# Patient Record
Sex: FEMALE | Race: WHITE | NOT HISPANIC OR LATINO | Employment: UNEMPLOYED | URBAN - METROPOLITAN AREA
[De-identification: names, ages, dates, MRNs, and addresses within clinical notes are randomized per-mention and may not be internally consistent; named-entity substitution may affect disease eponyms.]

---

## 2019-03-27 DIAGNOSIS — M25.571 RIGHT ANKLE PAIN: Primary | ICD-10-CM

## 2019-03-29 ENCOUNTER — HOSPITAL ENCOUNTER (OUTPATIENT)
Dept: RADIOLOGY | Facility: HOSPITAL | Age: 34
Discharge: HOME | End: 2019-03-29
Attending: ORTHOPAEDIC SURGERY
Payer: OTHER GOVERNMENT

## 2019-03-29 DIAGNOSIS — M25.571 RIGHT ANKLE PAIN: ICD-10-CM

## 2019-03-29 PROCEDURE — 73721 MRI JNT OF LWR EXTRE W/O DYE: CPT | Mod: 26,RT,, | Performed by: RADIOLOGY

## 2019-03-29 PROCEDURE — 73721 MRI ANKLE WITHOUT CONTRAST RIGHT: ICD-10-PCS | Mod: 26,RT,, | Performed by: RADIOLOGY

## 2019-03-29 PROCEDURE — 73721 MRI JNT OF LWR EXTRE W/O DYE: CPT | Mod: TC,RT

## 2023-10-25 ENCOUNTER — OFFICE VISIT (OUTPATIENT)
Dept: RHEUMATOLOGY | Facility: CLINIC | Age: 38
End: 2023-10-25
Payer: OTHER GOVERNMENT

## 2023-10-25 VITALS
HEART RATE: 84 BPM | SYSTOLIC BLOOD PRESSURE: 123 MMHG | HEIGHT: 63 IN | DIASTOLIC BLOOD PRESSURE: 72 MMHG | BODY MASS INDEX: 31.99 KG/M2 | WEIGHT: 180.56 LBS

## 2023-10-25 DIAGNOSIS — R20.0 BILATERAL HAND NUMBNESS: ICD-10-CM

## 2023-10-25 DIAGNOSIS — R53.83 FATIGUE, UNSPECIFIED TYPE: Primary | ICD-10-CM

## 2023-10-25 DIAGNOSIS — R76.8 POSITIVE ANA (ANTINUCLEAR ANTIBODY): ICD-10-CM

## 2023-10-25 PROBLEM — G90.A POTS (POSTURAL ORTHOSTATIC TACHYCARDIA SYNDROME): Status: ACTIVE | Noted: 2023-10-25

## 2023-10-25 PROCEDURE — 99204 PR OFFICE/OUTPT VISIT, NEW, LEVL IV, 45-59 MIN: ICD-10-PCS | Mod: S$PBB,,, | Performed by: STUDENT IN AN ORGANIZED HEALTH CARE EDUCATION/TRAINING PROGRAM

## 2023-10-25 PROCEDURE — 99999 PR PBB SHADOW E&M-NEW PATIENT-LVL III: CPT | Mod: PBBFAC,,, | Performed by: STUDENT IN AN ORGANIZED HEALTH CARE EDUCATION/TRAINING PROGRAM

## 2023-10-25 PROCEDURE — 99204 OFFICE O/P NEW MOD 45 MIN: CPT | Mod: S$PBB,,, | Performed by: STUDENT IN AN ORGANIZED HEALTH CARE EDUCATION/TRAINING PROGRAM

## 2023-10-25 PROCEDURE — 99203 OFFICE O/P NEW LOW 30 MIN: CPT | Mod: PBBFAC,PN | Performed by: STUDENT IN AN ORGANIZED HEALTH CARE EDUCATION/TRAINING PROGRAM

## 2023-10-25 PROCEDURE — 99999 PR PBB SHADOW E&M-NEW PATIENT-LVL III: ICD-10-PCS | Mod: PBBFAC,,, | Performed by: STUDENT IN AN ORGANIZED HEALTH CARE EDUCATION/TRAINING PROGRAM

## 2023-10-25 RX ORDER — NAPROXEN SODIUM 220 MG/1
TABLET, FILM COATED ORAL
COMMUNITY

## 2023-10-25 RX ORDER — FLUTICASONE PROPIONATE 50 MCG
SPRAY, SUSPENSION (ML) NASAL
COMMUNITY
Start: 2023-09-22

## 2023-10-25 RX ORDER — CYANOCOBALAMIN 1000 UG/ML
1000 INJECTION, SOLUTION INTRAMUSCULAR; SUBCUTANEOUS
COMMUNITY
Start: 2023-10-09

## 2023-10-25 RX ORDER — MAGNESIUM OXIDE 200 MG
TABLET,CHEWABLE ORAL
COMMUNITY

## 2023-10-25 RX ORDER — DILTIAZEM HYDROCHLORIDE 120 MG/1
120 CAPSULE, COATED, EXTENDED RELEASE ORAL
COMMUNITY
Start: 2023-09-11

## 2023-10-25 NOTE — PROGRESS NOTES
RHEUMATOLOGY OUTPATIENT CLINIC NOTE    10/25/2023    Attending Rheumatologist: Jagruti Winter  Primary Care Provider: No, Primary Doctor   Physician Requesting Consultation: Self, Aaareferral  No address on file  Chief Complaint/Reason For Consultation:  Positive ALEXIS, Rash, Fatigue, and Joint Pain      Subjective:       HPI  Luisa Jesus is a 38 y.o. White female with history of POTS, iron deficiency, vitamin B12 deficiency who comes for evaluation of positive ALEXIS.     She reports significant fatigue for about 1 year.   She was found to have low ferritin and have received IV iron intermittently. She reports her ferritin continues to drop and requires repeat IV iron. She has had colonoscopy and did not show any colitis or IBD. She has heavy menstrual periods.   She also received weekly B12 injections.   Vitamin D has been in the normal range. She takes daily vitamin D   She has been tested for sleep apnea and it is negative.     Regarding her cardiology issues, she notes elevated HR all the time. She is getting workup for afib, pending Holter monitor. She also has POTS. Bisoprolol caused fatigue. Has tried other BB but caused SE.    Has noted small hives in her wrist. Also noted that her face and neck can turn red. She feels like a burning sensation, no itching. She noticed it in the morning when she wakes up, usually gone by the night. Happens at least 3 times per week. With no trigger. Not related to sun exposure. She had recent rash when electrodes were placed for cardiac monitoring.     She reports dry mouth but not trouble swallowing.     Had painful sore in the lip, intermittently     Reports significant pain in both hands, R>L, feels like numbness and tingling and it burns, uses a brace at night but does not help.     Reports knee stiffness at random times, worse when sitting for a while.     She reports bilateral leg swelling  at the end of the day.     She keeps herself very active. 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 She lifts weights, she does cardio.     Denies raynauds.     Adopted so unsure of family history.     Answers submitted by the patient for this visit:  Rheumatology Questionnaire (Submitted on 10/25/2023)  fever: No  eye redness: No  mouth sores: No  headaches: Yes  shortness of breath: Yes  chest pain: Yes  trouble swallowing: No  diarrhea: Yes  constipation: No  unexpected weight change: No  genital sore: No  dysuria: No  During the last 3 days, have you had a skin rash?: Yes  Bruises or bleeds easily: Yes  cough: No      Chronic comorbid conditions affecting medical decision making today:  Past Medical History:   Diagnosis Date    Allergy     Atrial fibrillation 10/01/2023    aproxx    IBS (irritable bowel syndrome)     SVT (supraventricular tachycardia)     Thrush 01/22/2015    recurrent     Past Surgical History:   Procedure Laterality Date    CHOLECYSTECTOMY      [place] EXPLORATORY OF ABDOMEN      SINUS SURGERY  2007    SPINE SURGERY      C spine    TONSILLECTOMY      VENTRICULAR ABLATION SURGERY      WISDOM TOOTH EXTRACTION       Family History   Problem Relation Age of Onset    Heart disease Maternal Grandmother     Eclampsia Neg Hx     Colon cancer Neg Hx      Social History     Substance and Sexual Activity   Alcohol Use Yes    Comment: rarely     Social History     Tobacco Use   Smoking Status Never   Smokeless Tobacco Never     Social History     Substance and Sexual Activity   Drug Use No       Current Outpatient Medications:     aspirin 81 MG Chew, , Disp: , Rfl:     CHOLECALCIFEROL, VITAMIN D3, (VITAMIN D3 ORAL), Take by mouth., Disp: , Rfl:     cyanocobalamin 1,000 mcg/mL injection, Inject 1,000 mcg into the muscle every 30 days., Disp: , Rfl:     diltiaZEM (CARDIZEM CD) 120 MG Cp24, 120 mg., Disp: , Rfl:     fluticasone propionate (FLONASE) 50 mcg/actuation nasal spray, by Each Nostril route., Disp: , Rfl:     magnesium oxide 200 mg magnesium Chew, Take by  mouth., Disp: , Rfl:     multivit with calcium,iron,min (MULTIVITAMIN-CALCIUM AND IRON ORAL), , Disp: , Rfl:      Objective:         Vitals:    10/25/23 1347   BP: 123/72   Pulse: 84     Physical Exam   Constitutional: She is oriented to person, place, and time. She appears well-developed.   HENT:   Head: Normocephalic.   Mouth/Throat: Mucous membranes are moist.   Salivary pool adequate  Oral aperture is normal   Mouth/Throat: No oral ulcers   Cardiovascular: Normal rate and normal heart sounds.   Pulmonary/Chest: Effort normal and breath sounds normal.   Abdominal: Soft.   Musculoskeletal:      Cervical back: Neck supple.      Comments: Cspine FROM no tenderness  Tspine FROM no tenderness  Lspine FROM no tenderness.  Shoulders: FROM; no synovitis;  Elbows: FROM; no synovitis; no tophi or nodules  Wrists: FROM; no synovitis;    MCPs: FROM; no synovitis;   PIPs:FROM; no synovitis;   DIPs: FROM; no synovitis;   HIPS: FROM  Knees: FROM; no synovitis; no instability  Ankles: FROM: no synovitis   Toes: no tenderness on palpation.       Lymphadenopathy:     She has no cervical adenopathy.   Neurological: She is alert and oriented to person, place, and time.   Skin: No rash noted.   No skin rashes noted  Normal Capillaroscopy   Vitals reviewed.        All lab results personally reviewed and interpreted by me.  Lab Results   Component Value Date    WBC 4.82 08/04/2014    HGB 12.0 08/04/2014    HCT 39.8 08/04/2014    MCV 91 08/04/2014    MCH 27.3 08/04/2014    MCHC 30.2 (L) 08/04/2014    RDW 14.5 08/04/2014     08/04/2014    MPV 11.2 08/04/2014       Lab Results   Component Value Date     08/04/2014    K 4.0 08/04/2014     08/04/2014    CO2 20 (L) 08/04/2014    GLU 88 08/04/2014    BUN 10 08/04/2014    CALCIUM 9.0 08/04/2014    PROT 6.9 08/04/2014    ALBUMIN 4.1 08/04/2014    BILITOT 0.6 08/04/2014    AST 18 08/04/2014    ALKPHOS 62 08/04/2014    ALT 11 08/04/2014       Lab Results   Component Value Date  "   COLORU Yellow 09/22/2014    APPEARANCEUA Clear 09/22/2014    SPECGRAV 1.010 09/22/2014    PHUR 8.0 09/22/2014    PROTEINUA Negative 09/22/2014    KETONESU Negative 09/22/2014    LEUKOCYTESUR Negative 09/22/2014    NITRITE Negative 09/22/2014    UROBILINOGEN Negative 09/22/2014       Lab Results   Component Value Date    CRP 1.1 08/04/2014       Lab Results   Component Value Date    RF <10.0 01/27/2014    SEDRATE 6 01/27/2014    CCPANTIBODIE <0.5 01/27/2014       No components found for: "25OHVITDTOT", "82FBFYVD0", "62GELYAN1", "METHODNOTE"    No results found for: "URICACID"    No results found for: "QUANTIFERON", "HEPBCOREIGG", "HEPBSAB", "HEPBSAG", "HEPCAB"    Imaging:  All imaging reviewed and independently interpreted by me.         ASSESSMENT / PLAN:     Luisa Jesus is a 38 y.o. White female with history of POTS, iron deficiency, vitamin B12 deficiency who comes for evaluation of positive ALEXIS.       1. Fatigue, unspecified type  -Likely multifactorial.   -Iron deficiency is being treated with IV iron intermittenly. Takes vit B12 injections weekly.   -No sleep apnea, has been tested  -Discussed about FMS. She is already doing all non pharmacological measures. No medications as of now.   -Will recheck labs in 6 months     2. Positive ALEXIS (antinuclear antibody)  -ALEXIS 1:80 cytoplasmic reticular. Negative SSA, SSB, DSDNA, ribosomal P, SCL-70, Sm, SM/RNP.   -Neg RF. Normal C3 and C4. Normal ESR and CRP  -Her symptoms are not consistent with CTD at the moment. Discussed with patient to be aware if development of worsening and persistent joint pain with swelling, raynauds, photosensitive rashes, ulcers in the mouth that are painless.     3. Bilateral hand numbness  -I suspect that her hand numbness may be related to CTS.   -Will order EMG/NCS of UE.     Follow up in about 6 months (around 4/25/2024).    Method of contact with patient concerns: [facility]    Disclaimer:  This note is prepared " using voice recognition software and as such is likely to have errors and has not been proof read. Please contact me for questions.     Time spent: 45 minutes in face to face discussion concerning diagnosis, prognosis, review of lab and test results, benefits of treatment as well as management of disease, counseling of patient and coordination of care between various health care providers.  Greater than half the time spent was used for coordination of care and counseling of patient.    Jagruti Winter M.D.  Rheumatology  [facility]

## 2023-11-01 ENCOUNTER — TELEPHONE (OUTPATIENT)
Dept: RHEUMATOLOGY | Facility: CLINIC | Age: 38
End: 2023-11-01
Payer: OTHER GOVERNMENT

## 2023-11-01 NOTE — TELEPHONE ENCOUNTER
Contacted patient in regards to scheduling her EMG. She advised that she would like to go to the [facility] in [place] because that is closer to her. I provided her with the number to call to get this scheduled and let her know to call me if she did not have any success scheduling

## 2023-11-10 ENCOUNTER — PROCEDURE VISIT (OUTPATIENT)
Dept: NEUROLOGY | Facility: CLINIC | Age: 38
End: 2023-11-10
Payer: OTHER GOVERNMENT

## 2023-11-10 DIAGNOSIS — R20.0 BILATERAL HAND NUMBNESS: ICD-10-CM

## 2023-11-10 PROCEDURE — 95913 PR NERVE CONDUCTION STUDY; 13 OR MORE STUDIES: ICD-10-PCS | Mod: 26,S$PBB,, | Performed by: PSYCHIATRY & NEUROLOGY

## 2023-11-10 PROCEDURE — 95886 PR EMG COMPLETE, W/ NERVE CONDUCTION STUDIES, 5+ MUSCLES: ICD-10-PCS | Mod: 26,S$PBB,, | Performed by: PSYCHIATRY & NEUROLOGY

## 2023-11-10 PROCEDURE — 95886 MUSC TEST DONE W/N TEST COMP: CPT | Mod: 26,S$PBB,, | Performed by: PSYCHIATRY & NEUROLOGY

## 2023-11-10 PROCEDURE — 95913 NRV CNDJ TEST 13/> STUDIES: CPT | Mod: 26,S$PBB,, | Performed by: PSYCHIATRY & NEUROLOGY

## 2023-11-10 PROCEDURE — 95913 NRV CNDJ TEST 13/> STUDIES: CPT | Mod: PBBFAC | Performed by: PSYCHIATRY & NEUROLOGY

## 2023-11-10 PROCEDURE — 95886 MUSC TEST DONE W/N TEST COMP: CPT | Mod: PBBFAC | Performed by: PSYCHIATRY & NEUROLOGY

## 2023-11-29 ENCOUNTER — PATIENT MESSAGE (OUTPATIENT)
Dept: RHEUMATOLOGY | Facility: CLINIC | Age: 38
End: 2023-11-29
Payer: OTHER GOVERNMENT

## 2023-12-05 DIAGNOSIS — R20.0 BILATERAL HAND NUMBNESS: ICD-10-CM

## 2023-12-05 DIAGNOSIS — G56.00 CARPAL TUNNEL SYNDROME, UNSPECIFIED LATERALITY: ICD-10-CM

## 2023-12-05 DIAGNOSIS — N60.19 FIBROCYSTIC BREAST CHANGES, UNSPECIFIED LATERALITY: Primary | ICD-10-CM

## 2023-12-08 DIAGNOSIS — M79.642 BILATERAL HAND PAIN: Primary | ICD-10-CM

## 2023-12-08 DIAGNOSIS — M79.641 BILATERAL HAND PAIN: Primary | ICD-10-CM

## 2023-12-14 ENCOUNTER — OFFICE VISIT (OUTPATIENT)
Dept: ORTHOPEDICS | Facility: CLINIC | Age: 38
End: 2023-12-14
Payer: OTHER GOVERNMENT

## 2023-12-14 ENCOUNTER — HOSPITAL ENCOUNTER (OUTPATIENT)
Dept: RADIOLOGY | Facility: HOSPITAL | Age: 38
Discharge: HOME | End: 2023-12-14
Attending: ORTHOPAEDIC SURGERY
Payer: OTHER GOVERNMENT

## 2023-12-14 VITALS — HEIGHT: 63 IN | RESPIRATION RATE: 18 BRPM | BODY MASS INDEX: 31.89 KG/M2 | WEIGHT: 180 LBS

## 2023-12-14 DIAGNOSIS — G56.00 CARPAL TUNNEL SYNDROME, UNSPECIFIED LATERALITY: ICD-10-CM

## 2023-12-14 DIAGNOSIS — R20.0 BILATERAL HAND NUMBNESS: ICD-10-CM

## 2023-12-14 DIAGNOSIS — M79.642 BILATERAL HAND PAIN: ICD-10-CM

## 2023-12-14 DIAGNOSIS — M79.641 BILATERAL HAND PAIN: ICD-10-CM

## 2023-12-14 PROCEDURE — 73130 X-RAY EXAM OF HAND: CPT | Mod: TC,50,PN

## 2023-12-14 PROCEDURE — 99999 PR PBB SHADOW E&M-EST. PATIENT-LVL III: CPT | Mod: PBBFAC,,, | Performed by: ORTHOPAEDIC SURGERY

## 2023-12-14 PROCEDURE — 73130 XR HAND COMPLETE 3 VIEWS BILATERAL: ICD-10-PCS | Mod: 26,50,, | Performed by: RADIOLOGY

## 2023-12-14 PROCEDURE — 99999 PR PBB SHADOW E&M-EST. PATIENT-LVL III: ICD-10-PCS | Mod: PBBFAC,,, | Performed by: ORTHOPAEDIC SURGERY

## 2023-12-14 PROCEDURE — 99204 PR OFFICE/OUTPT VISIT, NEW, LEVL IV, 45-59 MIN: ICD-10-PCS | Mod: S$PBB,25,, | Performed by: ORTHOPAEDIC SURGERY

## 2023-12-14 PROCEDURE — 73130 X-RAY EXAM OF HAND: CPT | Mod: 26,50,, | Performed by: RADIOLOGY

## 2023-12-14 PROCEDURE — 99204 OFFICE O/P NEW MOD 45 MIN: CPT | Mod: S$PBB,25,, | Performed by: ORTHOPAEDIC SURGERY

## 2023-12-14 PROCEDURE — 20526 THER INJECTION CARP TUNNEL: CPT | Mod: S$PBB,RT,, | Performed by: ORTHOPAEDIC SURGERY

## 2023-12-14 PROCEDURE — 99999PBSHW PR PBB SHADOW TECHNICAL ONLY FILED TO HB: Mod: PBBFAC,,,

## 2023-12-14 PROCEDURE — 99213 OFFICE O/P EST LOW 20 MIN: CPT | Mod: PBBFAC,25,PN | Performed by: ORTHOPAEDIC SURGERY

## 2023-12-14 PROCEDURE — 20526 PR INJECT CARPAL TUNNEL: ICD-10-PCS | Mod: S$PBB,RT,, | Performed by: ORTHOPAEDIC SURGERY

## 2023-12-14 PROCEDURE — 99999PBSHW PR PBB SHADOW TECHNICAL ONLY FILED TO HB: ICD-10-PCS | Mod: PBBFAC,,,

## 2023-12-14 PROCEDURE — 20526 THER INJECTION CARP TUNNEL: CPT | Mod: PBBFAC,PN,RT | Performed by: ORTHOPAEDIC SURGERY

## 2023-12-14 RX ORDER — PREDNISONE 5 MG/1
TABLET ORAL
COMMUNITY
Start: 2023-12-11

## 2023-12-14 RX ORDER — TRIAMCINOLONE ACETONIDE 40 MG/ML
40 INJECTION, SUSPENSION INTRA-ARTICULAR; INTRAMUSCULAR
Status: DISCONTINUED | OUTPATIENT
Start: 2023-12-14 | End: 2023-12-14 | Stop reason: HOSPADM

## 2023-12-14 RX ADMIN — TRIAMCINOLONE ACETONIDE 40 MG: 40 INJECTION, SUSPENSION INTRA-ARTICULAR; INTRAMUSCULAR at 03:12

## 2023-12-14 NOTE — PROCEDURES
Carpal Tunnel    Date/Time: 12/14/2023 3:45 PM    Performed by: Fredrick Carvajal MD  Authorized by: Fredrick Carvajal MD    Consent Done?:  Yes (Verbal)  Indications:  Pain  Site marked: the procedure site was marked    Timeout: prior to procedure the correct patient, procedure, and site was verified    Prep: patient was prepped and draped in usual sterile fashion      Local anesthesia used?: Yes    Anesthesia:  Local infiltration  Local anesthetic:  Bupivacaine 0.25% without epinephrine and lidocaine 1% without epinephrine  Anesthetic total (ml):  2    Location: R carpal tunnel.  Needle size:  21 G  Approach:  Volar  Medications:  40 mg triamcinolone acetonide 40 mg/mL  Patient tolerance:  Patient tolerated the procedure well with no immediate complications

## 2023-12-14 NOTE — PROGRESS NOTES
Subjective:      Patient ID: Luisa Jesus is a 38 y.o. female.    Chief Complaint: No chief complaint on file.    HPI  38-year-old female with a many year history of bilateral hand pain tingling and numbness right greater than left.  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  She recalls no particular trauma to her hand.  She has been wearing night splints for approximate [place] taking occasional anti-inflammatory medicines without any improvement.  Recently he has had nerve studies referred to us for further evaluation.  ROS      Objective:    Ortho Exam     Constitutional:   Patient is alert  and oriented in no acute distress  HEENT:  normocephalic atraumatic; PERRL EOMI  Neck:  Supple without adenopathy  Cardiovascular:  Normal rate and rhythm  Pulmonary:  Normal respiratory effort normal chest wall expansion  Abdominal:  Nonprotuberant nondistended  Musculoskeletal:  Patient has adequate cervical range of motion without tenderness  With a negative Lhermitte's and Spurling sign.  Upon gross inspection of the hands  There is no mass, swelling, or atrophy.  Strength is 5/5 including APB strength testing  Negative Finkelstein's and CMC grind.  Positive primarily right carpal tunnel compression test  Negative Tinel's.  Equivocal Phalen's  Neurological:  No focal defect; cranial nerves 2-12 grossly intact  Psychiatric/behavioral:  Mood and behavior normal           My Radiographs Findings:    Radiographs of the right and left hands without acute osseous abnormalities  Assessment:       Encounter Diagnoses   Name Primary?    Carpal tunnel syndrome, unspecified laterality     Bilateral hand numbness          Plan:       I have discussed medical condition treatment options with her at length.  With the longevity of her symptoms and progression of the right hand she maybe a future candidate for carpal tunnel release.  I think it reasonable to give her a trial of an injection along with the  bracing.  Carpal carpal canal injection was done today after verbal consent and sterile prep without complication.  I have discussed activity restrictions over the next 6 weeks.  Follow up at that time for re-evaluation sooner if any questions or problems.        Past Medical History:   Diagnosis Date    Allergy     Atrial fibrillation 10/01/2023    aproxx    IBS (irritable bowel syndrome)     SVT (supraventricular tachycardia)     Thrush 01/22/2015    recurrent     Past Surgical History:   Procedure Laterality Date    CHOLECYSTECTOMY      [place] EXPLORATORY OF ABDOMEN      SINUS SURGERY  2007    SPINE SURGERY      C spine    TONSILLECTOMY      VENTRICULAR ABLATION SURGERY      WISDOM TOOTH EXTRACTION           Current Outpatient Medications:     aspirin 81 MG Chew, , Disp: , Rfl:     CHOLECALCIFEROL, VITAMIN D3, (VITAMIN D3 ORAL), Take by mouth., Disp: , Rfl:     cyanocobalamin 1,000 mcg/mL injection, Inject 1,000 mcg into the muscle every 30 days., Disp: , Rfl:     diltiaZEM (CARDIZEM CD) 120 MG Cp24, 120 mg., Disp: , Rfl:     fluticasone propionate (FLONASE) 50 mcg/actuation nasal spray, by Each Nostril route., Disp: , Rfl:     magnesium oxide 200 mg magnesium Chew, Take by mouth., Disp: , Rfl:     multivit with calcium,iron,min (MULTIVITAMIN-CALCIUM AND IRON ORAL), , Disp: , Rfl:     Review of patient's allergies indicates:   Allergen Reactions    Diclofenac sodium Other (See Comments)     Other reaction(s): Bloody stool    Ortho tri-cyclen (21) Hives    Sulfa (sulfonamide antibiotics) Hives and Rash    Scopolamine     Sulfur Hives    Bactrim [sulfamethoxazole-trimethoprim] Rash       Family History   Problem Relation Age of Onset    Heart disease Maternal Grandmother     Eclampsia Neg Hx     Colon cancer Neg Hx      Social History     Occupational History    Occupation: back in school for nutrition   Tobacco Use    Smoking status: Never    Smokeless tobacco: Never   Substance and Sexual Activity    Alcohol use: Yes      Comment: rarely    Drug use: No    Sexual activity: Yes     Partners: Male     Birth control/protection: None

## 2024-03-08 ENCOUNTER — TELEPHONE (OUTPATIENT)
Dept: OBSTETRICS AND GYNECOLOGY | Facility: CLINIC | Age: 39
End: 2024-03-08
Payer: OTHER GOVERNMENT

## 2024-03-08 ENCOUNTER — HOSPITAL ENCOUNTER (OUTPATIENT)
Dept: RADIOLOGY | Facility: HOSPITAL | Age: 39
Discharge: HOME | End: 2024-03-08
Attending: PHYSICIAN ASSISTANT
Payer: OTHER GOVERNMENT

## 2024-03-08 VITALS
DIASTOLIC BLOOD PRESSURE: 84 MMHG | BODY MASS INDEX: 30.65 KG/M2 | HEART RATE: 98 BPM | SYSTOLIC BLOOD PRESSURE: 120 MMHG | WEIGHT: 173 LBS | HEIGHT: 63 IN

## 2024-03-08 DIAGNOSIS — N60.19 FIBROCYSTIC BREAST CHANGES, UNSPECIFIED LATERALITY: ICD-10-CM

## 2024-03-08 DIAGNOSIS — N94.6 DYSMENORRHEA: ICD-10-CM

## 2024-03-08 DIAGNOSIS — N92.0 MENORRHAGIA WITH REGULAR CYCLE: ICD-10-CM

## 2024-03-08 DIAGNOSIS — N94.6 DYSMENORRHEA: Primary | ICD-10-CM

## 2024-03-08 PROCEDURE — 99999 PR PBB SHADOW E&M-EST. PATIENT-LVL IV: CPT | Mod: PBBFAC,,, | Performed by: PHYSICIAN ASSISTANT

## 2024-03-08 PROCEDURE — 99214 OFFICE O/P EST MOD 30 MIN: CPT | Mod: PBBFAC | Performed by: PHYSICIAN ASSISTANT

## 2024-03-08 PROCEDURE — 76830 TRANSVAGINAL US NON-OB: CPT | Mod: TC

## 2024-03-08 PROCEDURE — 99204 OFFICE O/P NEW MOD 45 MIN: CPT | Mod: S$PBB,,, | Performed by: PHYSICIAN ASSISTANT

## 2024-03-08 NOTE — PROGRESS NOTES
Subjective:      Luisa Jesus is a 39 y.o. female who presents for dysmenorrhea and AUB. Menarche at age 10. She is . She has always had heavy and painful periods. Periods typically lasting about 10 days. She also reports dyspareunia and has been told she likely has endometriosis. Unable to tolerate OCP in the past and declined IUD. In 2024 she had heavy vaginal bleeding x 19 days. Given depo-provera and bleeding stopped. Gyn in [facility] did endometrial biopsy at that time that was negative per pt. She did have another period on 24 that was dark brown, light and malodorous. She is interested in considering surgical options.   3cm cyst in the left breast. Saw surgery who discussed draining vs removal, but has decided to monitor for now.    Mammogram: 10/12/2023 (scanned in media)  Pap: 10/2023 with gyn, negative  PCP: Madelyn Peterson MD  Routine Screening Labs: 2024 (scanned in media)    No visits with results within 3 Month(s) from this visit.   Latest known visit with results is:   Office Visit on 2016   Component Date Value Ref Range Status    HPV DNA 2016 Not detected  Not detected Final       Past Medical History:   Diagnosis Date    Allergy     Atrial fibrillation 10/01/2023    aproxx    IBS (irritable bowel syndrome)     SVT (supraventricular tachycardia)     Thrush 2015    recurrent     Past Surgical History:   Procedure Laterality Date    CHOLECYSTECTOMY      [place] EXPLORATORY OF ABDOMEN      SINUS SURGERY      SPINE SURGERY      C spine    TONSILLECTOMY      VENTRICULAR ABLATION SURGERY      WISDOM TOOTH EXTRACTION       Social History     Tobacco Use    Smoking status: Never    Smokeless tobacco: Never   Substance Use Topics    Alcohol use: Yes     Comment: rarely    Drug use: No     Family History   Adopted: Yes   Problem Relation Age of Onset    Heart disease Maternal Grandmother     Eclampsia Neg Hx     Colon cancer Neg Hx      OB History    Para Term  " AB Living   3 3           SAB IAB Ectopic Multiple Live Births                  # Outcome Date GA Lbr Jose/2nd Weight Sex Delivery Anes PTL Lv   3 Para            2 Para            1 Para                Current Outpatient Medications:     aspirin 81 MG Chew, , Disp: , Rfl:     CHOLECALCIFEROL, VITAMIN D3, (VITAMIN D3 ORAL), Take by mouth., Disp: , Rfl:     cyanocobalamin 1,000 mcg/mL injection, Inject 1,000 mcg into the muscle every 30 days., Disp: , Rfl:     diltiaZEM (CARDIZEM CD) 120 MG Cp24, 120 mg., Disp: , Rfl:     fluticasone propionate (FLONASE) 50 mcg/actuation nasal spray, by Each Nostril route., Disp: , Rfl:     magnesium oxide 200 mg magnesium Chew, Take by mouth., Disp: , Rfl:     multivit with calcium,iron,min (MULTIVITAMIN-CALCIUM AND IRON ORAL), , Disp: , Rfl:     predniSONE (DELTASONE) 5 MG tablet, Take by mouth., Disp: , Rfl:     Review of Systems:  General: No fever, chills, or weight loss.  Chest: No chest pain, shortness of breath, or palpitations.  Breast: No pain, masses, or nipple discharge.  Vulva: No pain, lesions, or itching.  Vagina: No relaxation, itching, discharge, or lesions.  Abdomen: No pain, nausea, vomiting, diarrhea, or constipation.  Urinary: No incontinence, nocturia, frequency, or dysuria.  Extremities:  No leg cramps, edema, or calf pain.  Neurologic: No headaches, dizziness, or visual changes.    Objective:     Vitals:    24 1427   BP: 120/84   Pulse: 98   Weight: 78.5 kg (173 lb)   Height: 5' 3" (1.6 m)   PainSc: 0-No pain     Body mass index is 30.65 kg/m².    PHYSICAL EXAM:  APPEARANCE: Well nourished, well developed, in no acute distress.  AFFECT: WNL, alert and oriented x 3    Assessment:      Dysmenorrhea  -     US Pelvis Comp with Transvag NON-OB (xpd; Future; Expected date: 2024    Fibrocystic breast changes, unspecified laterality  -     Ambulatory referral/consult to Gynecology    Menorrhagia with regular cycle  -     US Pelvis Comp with Transvag " NON-OB (xpd; Future; Expected date: 03/08/2024        Plan:   Pelvic US  She is currently on depo-provera with outside gyn and controlling bleeding.  Referral to Dr. Martinez for concerns for endometriosis  Follow up PRN    Instructed patient to call if she experiences any side effects or has any questions.    I spent a total of 30 minutes on the day of the visit.This includes face to face time and non-face to face time preparing to see the patient (eg, review of tests), obtaining and/or reviewing separately obtained history, documenting clinical information in the electronic or other health record, independently interpreting results and communicating results to the patient/family/caregiver, or care coordinator.

## 2024-03-08 NOTE — TELEPHONE ENCOUNTER
----- Message from Alisson Cook PA-C sent at 3/8/2024  3:31 PM CST -----  Hi!  This is a new patient who has been told she has endometriosis due to menorrhagia, dysmenorrhea and dyspareunia. She is interested in surgical options. Do you mind scheduling her with Dr. Martinez? Thank you!  Alisson

## 2024-04-18 ENCOUNTER — OFFICE VISIT (OUTPATIENT)
Dept: OBSTETRICS AND GYNECOLOGY | Facility: CLINIC | Age: 39
End: 2024-04-18
Payer: OTHER GOVERNMENT

## 2024-04-18 VITALS
BODY MASS INDEX: 30.97 KG/M2 | HEIGHT: 63 IN | SYSTOLIC BLOOD PRESSURE: 129 MMHG | WEIGHT: 174.81 LBS | HEART RATE: 84 BPM | DIASTOLIC BLOOD PRESSURE: 75 MMHG

## 2024-04-18 DIAGNOSIS — N94.6 DYSMENORRHEA: Primary | ICD-10-CM

## 2024-04-18 DIAGNOSIS — N80.30 ENDOMETRIOSIS, PELVIC PERITONEUM: ICD-10-CM

## 2024-04-18 PROCEDURE — 99215 OFFICE O/P EST HI 40 MIN: CPT | Mod: 57,S$PBB,, | Performed by: OBSTETRICS & GYNECOLOGY

## 2024-04-18 PROCEDURE — 99213 OFFICE O/P EST LOW 20 MIN: CPT | Mod: PBBFAC | Performed by: OBSTETRICS & GYNECOLOGY

## 2024-04-18 PROCEDURE — 99999 PR PBB SHADOW E&M-EST. PATIENT-LVL III: CPT | Mod: PBBFAC,,, | Performed by: OBSTETRICS & GYNECOLOGY

## 2024-04-18 RX ORDER — MELOXICAM 15 MG/1
15 TABLET ORAL DAILY
Qty: 30 TABLET | Refills: 0 | Status: SHIPPED | OUTPATIENT
Start: 2024-04-18

## 2024-04-18 NOTE — PROGRESS NOTES
Subjective     Patient ID: Luisa Jesus is a 39 y.o. female.    Chief Complaint:  Consult      History of Present Illness  Pelvic Pain  The patient's primary symptoms include pelvic pain. The patient's pertinent negatives include no vaginal discharge. This is a chronic problem. The current episode started more than 1 year ago. The problem has been gradually worsening. The pain is moderate. The problem affects the right side. She is not pregnant. Associated symptoms include abdominal pain, back pain, diarrhea, flank pain, frequency, headaches, nausea, painful intercourse, rash and urgency. Pertinent negatives include no anorexia, chills, constipation, discolored urine, dysuria, fever, hematuria or vomiting. The symptoms are aggravated by intercourse and menstrual cycle. She has tried acetaminophen, heating pad, NSAIDs and warm bath for the symptoms. The treatment provided mild relief. She is sexually active. No, her partner does not have an STD. She uses nothing for contraception. Her menstrual history has been irregular. Her past medical history is significant for an abdominal surgery, endometriosis, menorrhagia, metrorrhagia and ovarian cysts. There is no history of a  section, an ectopic pregnancy, a gynecological surgery, herpes simplex, miscarriage, perineal abscess, PID, an STD, a terminated pregnancy or vaginosis.     Pt is 39 y.o. with Patient's last menstrual period was 2024.  Here for assessment of possible endometriosis.  Pt had Dx LSC in  where ? Endometriosis was found (no op note to review).  Pt has always had pain on the right side.  States that she typically has 1 good week pain free each month.  Also has associated dyspareunia.    Has previously tried HARJINDER's and progestin only pills.  Had mood issues.  Not tried an IUD.    She never had regular cycles.  They were always heavy, but would tend to come on time.  Has had more weight gain than usual (she is very active at the  gym).  Had recent TSH that was nl.      In January, she had a 19 day cycle.  Had normal u/s, normal EMBx, and started on DMPA.  Menses  for 19 days.  Black.  No in march.  Started again on .    Ready to figure out what is exactly happening and deal with both the pain and bleeding.    GYN & OB History  Patient's last menstrual period was 2024.   Date of Last Pap: No result found    OB History    Para Term  AB Living   3 3           SAB IAB Ectopic Multiple Live Births                  # Outcome Date GA Lbr Jose/2nd Weight Sex Type Anes PTL Lv   3 Para            2 Para            1 Para                Review of Systems  Review of Systems   Constitutional:  Negative for activity change, appetite change, chills, fatigue and fever.   HENT: Negative.  Negative for tinnitus.    Eyes: Negative.    Respiratory:  Negative for cough and shortness of breath.    Cardiovascular:  Negative for chest pain and palpitations.   Gastrointestinal:  Positive for abdominal pain, diarrhea and nausea. Negative for anorexia, blood in stool, constipation and vomiting.   Endocrine: Negative.  Negative for hot flashes.   Genitourinary:  Positive for flank pain, frequency, menorrhagia, pelvic pain and urgency. Negative for dysmenorrhea, dyspareunia, dysuria, hematuria, menstrual problem, vaginal discharge, urinary incontinence and postcoital bleeding.   Musculoskeletal:  Positive for back pain. Negative for joint swelling.   Integumentary:  Positive for rash. Negative for breast mass, nipple discharge and breast skin changes.   Neurological:  Positive for headaches.   Hematological: Negative.  Does not bruise/bleed easily.   Psychiatric/Behavioral:  The patient is not nervous/anxious.    Breast: negative.  Negative for mass, nipple discharge and skin changes         Objective   Physical Exam:   Constitutional: She is oriented to person, place, and time. She appears well-developed and well-nourished. No distress.   "  HENT:   Head: Normocephalic and atraumatic.    Eyes: Pupils are equal, round, and reactive to light. Conjunctivae and lids are normal.     Cardiovascular:  Normal rate and regular rhythm.      Exam reveals no edema.        Pulmonary/Chest: Effort normal.        Abdominal: Soft. Bowel sounds are normal. She exhibits no distension. There is no abdominal tenderness. There is no rebound and no guarding.     Genitourinary:    Vagina, uterus, right adnexa and left adnexa normal.      Pelvic exam was performed with patient supine.   The external female genitalia was normal.     Labial bartholins normal.There is no tenderness or lesion on the right labia. There is no tenderness or lesion on the left labia. Cervix is normal. Right adnexum displays no mass and no tenderness. Left adnexum displays no mass and no tenderness. No vaginal discharge, rectocele, cystocele or prolapse of vaginal walls in the vagina. Cervix exhibits no motion tenderness and no discharge. Uterus is not deviated, not fixed and not hosting fibroids. Normal urethral meatus.Urethra findings: no tendernessBladder findings: no bladder tenderness   Genitourinary Comments: A female chaperone was present for the entire exam    No significant pelvic floor spasm.             Musculoskeletal: Normal range of motion and moves all extremeties.       Neurological: She is alert and oriented to person, place, and time. She has normal strength.    Skin: Skin is warm and dry.    Psychiatric: She has a normal mood and affect. Her speech is normal and behavior is normal. Thought content normal. Her mood appears not anxious. She does not exhibit a depressed mood. She expresses no suicidal plans and no homicidal plans.            Assessment and Plan     1. Dysmenorrhea    2. Endometriosis, pelvic peritoneum             Plan:  Luisa Hannah" was seen today for consult.    Diagnoses and all orders for this visit:    Dysmenorrhea  -     meloxicam (MOBIC) 15 MG tablet; Take 1 " tablet (15 mg total) by mouth once daily.  We had a long discussion about her current situation and tried to prioritize the 2 issues she is having -- pain and bleeding.  While hysterectomy will cure the AUB, she is aware that it may not help the pain.  Nevertheless, since this is impacting her ability to work, she would like more definitive treatment (TLH, RSO).  Aware that RSO may not help with her pain, but given the point nature of her pain, I do suspect she has a nodule of endometriosis.  No pelvic floor spasm aspect of her pain currently so will hold on pelvic floor PT for now.    Will use mobic to help with her pain.  And finally, aware that she will have unusual bleeding after her 1 dose of DMPA (she does not desire to continue that medication).    Endometriosis, pelvic peritoneum  -     meloxicam (MOBIC) 15 MG tablet; Take 1 tablet (15 mg total) by mouth once daily.

## 2024-08-29 ENCOUNTER — PATIENT MESSAGE (OUTPATIENT)
Dept: RHEUMATOLOGY | Facility: CLINIC | Age: 39
End: 2024-08-29
Payer: OTHER GOVERNMENT

## 2024-10-10 ENCOUNTER — HOSPITAL ENCOUNTER (OUTPATIENT)
Dept: RADIOLOGY | Facility: HOSPITAL | Age: 39
Discharge: HOME | End: 2024-10-10
Payer: OTHER GOVERNMENT

## 2024-10-10 ENCOUNTER — LAB VISIT (OUTPATIENT)
Dept: LAB | Facility: OTHER | Age: 39
End: 2024-10-10
Attending: STUDENT IN AN ORGANIZED HEALTH CARE EDUCATION/TRAINING PROGRAM
Payer: OTHER GOVERNMENT

## 2024-10-10 ENCOUNTER — OFFICE VISIT (OUTPATIENT)
Dept: NEUROLOGY | Facility: CLINIC | Age: 39
End: 2024-10-10
Payer: OTHER GOVERNMENT

## 2024-10-10 VITALS
SYSTOLIC BLOOD PRESSURE: 134 MMHG | HEART RATE: 92 BPM | HEIGHT: 63 IN | WEIGHT: 174 LBS | BODY MASS INDEX: 30.83 KG/M2 | DIASTOLIC BLOOD PRESSURE: 83 MMHG

## 2024-10-10 DIAGNOSIS — Z12.31 ENCOUNTER FOR SCREENING MAMMOGRAM FOR BREAST CANCER: ICD-10-CM

## 2024-10-10 DIAGNOSIS — M54.9 DORSALGIA, UNSPECIFIED: ICD-10-CM

## 2024-10-10 DIAGNOSIS — R29.2 HYPERREFLEXIA: ICD-10-CM

## 2024-10-10 DIAGNOSIS — R20.2 PARESTHESIA OF BOTH LOWER EXTREMITIES: Primary | ICD-10-CM

## 2024-10-10 DIAGNOSIS — R35.0 URINARY FREQUENCY: ICD-10-CM

## 2024-10-10 DIAGNOSIS — M79.2 NEUROPATHIC PAIN: ICD-10-CM

## 2024-10-10 DIAGNOSIS — R20.2 PARESTHESIA OF BOTH LOWER EXTREMITIES: ICD-10-CM

## 2024-10-10 PROBLEM — R19.7 DIARRHEA: Status: ACTIVE | Noted: 2024-10-10

## 2024-10-10 PROBLEM — R76.8 ANA POSITIVE: Status: ACTIVE | Noted: 2024-10-10

## 2024-10-10 PROBLEM — M25.531 BILATERAL WRIST PAIN: Status: ACTIVE | Noted: 2024-10-10

## 2024-10-10 PROBLEM — G90.1 DYSAUTONOMIA: Status: ACTIVE | Noted: 2024-10-10

## 2024-10-10 PROBLEM — I45.9 CARDIAC CONDUCTION DISORDER: Status: ACTIVE | Noted: 2024-10-10

## 2024-10-10 PROBLEM — E53.8 LOW SERUM VITAMIN B12: Status: ACTIVE | Noted: 2024-10-10

## 2024-10-10 PROBLEM — K21.9 GERD (GASTROESOPHAGEAL REFLUX DISEASE): Status: ACTIVE | Noted: 2024-10-10

## 2024-10-10 PROBLEM — M25.532 BILATERAL WRIST PAIN: Status: ACTIVE | Noted: 2024-10-10

## 2024-10-10 PROCEDURE — 82525 ASSAY OF COPPER: CPT | Performed by: STUDENT IN AN ORGANIZED HEALTH CARE EDUCATION/TRAINING PROGRAM

## 2024-10-10 PROCEDURE — 86334 IMMUNOFIX E-PHORESIS SERUM: CPT | Performed by: STUDENT IN AN ORGANIZED HEALTH CARE EDUCATION/TRAINING PROGRAM

## 2024-10-10 PROCEDURE — 99204 OFFICE O/P NEW MOD 45 MIN: CPT | Mod: S$PBB,,, | Performed by: STUDENT IN AN ORGANIZED HEALTH CARE EDUCATION/TRAINING PROGRAM

## 2024-10-10 PROCEDURE — 77067 SCR MAMMO BI INCL CAD: CPT | Mod: TC

## 2024-10-10 PROCEDURE — 84630 ASSAY OF ZINC: CPT | Performed by: STUDENT IN AN ORGANIZED HEALTH CARE EDUCATION/TRAINING PROGRAM

## 2024-10-10 PROCEDURE — 84165 PROTEIN E-PHORESIS SERUM: CPT | Mod: 26,,, | Performed by: PATHOLOGY

## 2024-10-10 PROCEDURE — 99213 OFFICE O/P EST LOW 20 MIN: CPT | Mod: PBBFAC | Performed by: STUDENT IN AN ORGANIZED HEALTH CARE EDUCATION/TRAINING PROGRAM

## 2024-10-10 PROCEDURE — 84165 PROTEIN E-PHORESIS SERUM: CPT | Performed by: STUDENT IN AN ORGANIZED HEALTH CARE EDUCATION/TRAINING PROGRAM

## 2024-10-10 PROCEDURE — 86258 DGP ANTIBODY EACH IG CLASS: CPT | Performed by: STUDENT IN AN ORGANIZED HEALTH CARE EDUCATION/TRAINING PROGRAM

## 2024-10-10 PROCEDURE — 86364 TISS TRNSGLTMNASE EA IG CLAS: CPT | Mod: 59 | Performed by: STUDENT IN AN ORGANIZED HEALTH CARE EDUCATION/TRAINING PROGRAM

## 2024-10-10 PROCEDURE — 86334 IMMUNOFIX E-PHORESIS SERUM: CPT | Mod: 26,,, | Performed by: PATHOLOGY

## 2024-10-10 PROCEDURE — 84446 ASSAY OF VITAMIN E: CPT | Performed by: STUDENT IN AN ORGANIZED HEALTH CARE EDUCATION/TRAINING PROGRAM

## 2024-10-10 PROCEDURE — 84207 ASSAY OF VITAMIN B-6: CPT | Performed by: STUDENT IN AN ORGANIZED HEALTH CARE EDUCATION/TRAINING PROGRAM

## 2024-10-10 PROCEDURE — 99999 PR PBB SHADOW E&M-EST. PATIENT-LVL III: CPT | Mod: PBBFAC,,, | Performed by: STUDENT IN AN ORGANIZED HEALTH CARE EDUCATION/TRAINING PROGRAM

## 2024-10-10 RX ORDER — ADALIMUMAB 40MG/0.4ML
40 KIT SUBCUTANEOUS
COMMUNITY

## 2024-10-10 RX ORDER — DULOXETIN HYDROCHLORIDE 30 MG/1
30 CAPSULE, DELAYED RELEASE ORAL DAILY
COMMUNITY

## 2024-10-10 NOTE — PROGRESS NOTES
"        Patient ID: [id number]  Referring Physician: Self, Aaareferral    Chief Complaint/Reason for Consult: multiple neurological symptoms  Subjective:     HPI  Luisa Jesus is a pleasant 39 y.o. RH female with POTS/dysautonomia, cardiac arrhythmia, IBS, fibromyalgia, and unidentified rheumatologic/connective tissue disorder. she is presenting today for a second opinion on potential multiple sclerosis regarding multiple neurological symptoms.     Sh reports numbness, tingling, vibration, "electric shocks" in random spots in the body that have been going on for about a year.   Reports new vertigo, tinnitus, blurred and double vision this summer. She has also incoordination and is dropping objects more often.  She has been evaluated by ENT and undergone audiology and VNG which were reportedly normal.  Eye evaluation has been reportedly normal as well.   Reports frequent stumbling and tripping over her own feet.  She reports urinary frequency and nocturia x1 year. Denies change in bowel habits. Denies incontinence with either.  She sometimes has episodes of tight sensation around the rib cage that wake her up in the middle of the night that may last 2 hours.  She has positive ALEXIS and arthralgia. Follows with a rheumatologist and receives Humira for presumed connective tissue disease.      Relevant history:   Diabetes Mellitus: (--)  Bariatric surgery: (--)  Vegan/vegetarian: (+), for 4 years after 2014, has Hx of B12 deficiency   Celiac/Crohn's/UC:(--)  Substance use (juan. Nitrous oxide): (--)  Cervical/lumbar spondylosis: (+)  Recent immunization: (--)    Review of Systems   Constitutional:  Positive for fatigue.   HENT:  Positive for tinnitus. Negative for trouble swallowing.    Eyes:  Positive for visual disturbance.   Gastrointestinal:  Negative for constipation and fecal incontinence.   Genitourinary:  Positive for frequency. Negative for bladder incontinence and difficulty urinating.   Musculoskeletal:  " Positive for back pain and gait problem. Negative for joint swelling and neck pain.   Neurological:  Positive for dizziness. Negative for weakness and numbness.   Psychiatric/Behavioral:  Negative for agitation and confusion.      Past Medical History:  -------------------------------------    Allergy    Atrial fibrillation    aproxx    IBS (irritable bowel syndrome)    SVT (supraventricular tachycardia)    Thrush    recurrent     Allergies:  Review of patient's allergies indicates:   Allergen Reactions    Diclofenac sodium Other (See Comments)     Other reaction(s): Bloody stool    Ortho tri-cyclen (21) Hives    Sulfa (sulfonamide antibiotics) Hives and Rash    Scopolamine     Sulfur Hives    Bactrim [sulfamethoxazole-trimethoprim] Rash       Pertinent Family History:  Family History   Adopted: Yes   Problem Relation Name Age of Onset    Heart disease Maternal Grandmother      Eclampsia Neg Hx      Colon cancer Neg Hx         Pertinent Social History:  Social History     Tobacco Use    Smoking status: Never    Smokeless tobacco: Never   Substance Use Topics    Alcohol use: Yes     Comment: rarely    Drug use: No       Medications:  Current Outpatient Medications   Medication Instructions    aspirin 81 MG Chew No dose, route, or frequency recorded.    CHOLECALCIFEROL, VITAMIN D3, (VITAMIN D3 ORAL) Oral    diltiaZEM (CARDIZEM CD) 120 mg    magnesium oxide 200 mg magnesium Chew Oral    meloxicam (MOBIC) 15 mg, Oral, Daily    multivit with calcium,iron,min (MULTIVITAMIN-CALCIUM AND IRON ORAL) No dose, route, or frequency recorded.      Objective:     Vitals:    10/10/24 0752   BP: 134/83   Pulse: 92        General:  Well-appearing, well-nourished, NAD, cooperative    Neurologic Exam:   Awake, alert and oriented x3  Speech spontaneous and fluent, intact comprehension.   Adequate fund of knowledge, vocabulary.    CN II - CN XII:  PERRLA. EOM intact. No Nystagmus. No ophthalmoplegia.   Facial sensation is decreased on  right V2 and V3.   Facial expression is full and symmetric.   Hearing is intact bilaterally.   Palate elevates symmetrically.   SCM and Trapezius full strength bilaterally.   Tongue is midline.     Motor:  Normal bulk and tone in all four limbs.   There are no atrophy or fasciculations. No tremor.     Shoulder  Abd Shoulder Add Elbow   Flex Elbow  Ext Wrist   Flex Wrist  Ext Finger  Flex Finger  Ext Finger  Abd Finger   Add IO Opposition   Right 5 5 5 5 4+      4+    Left 5 5 5 5       5       Hip  Flex Hip  Ext Thigh   Abd Thigh  Add Knee  Flex Knee  Ext Plantar  Flex Dorsiflex   Right 5 5   5 5 5 5   Left 5 5   5 5 5 5     Sensory:  Light touch: reduced tactile sense on RLE. BUE normal, right Tinel +  Temperature: reduced on RUE and on RLE  Vibration: vibratory sense present throughout  Proprioception: proprioceptive sense present throughout  Romberg is negative.    DTRs:   Biceps Brachioradialis Triceps Winnie Patellar Ankle Plantar   Right 3+ 3+  + 3+ 2+    Left 3+ 3+  - 3+ 2+    *bilateral cross adductors    Coordination:  Finger to nose is normal bilaterally.  Slowed finger taps on right     fine finger movements and rapid alternating movements.    Gait:  Normal casual and tandem gait.      Pertinent lab results    *Outside labs reviewed in person:  7/25/24  Esr 3  Cbc nl  Ck 56  HBsAg -  HCV -  Cmp nl  Mg 2  Crp 0.15  A1c 4.9  Vit 84  B12 751  folate >20  HIV-  RF -  ALEXIS+ (cyto, 1:80)  The rest negative  Myomarker -    EBV Ag IgG 276  EBV Ag IgM -  CCP -  Aldolase 3.1    Lab Results   Component Value Date    TTDVDFQV57OF 43 08/04/2014     Lab Results   Component Value Date    ANASCREEN Negative <1:160 01/27/2014    ANTISSAANTIB 2.34 01/27/2014    RF <10.0 01/27/2014    SEDRATE 6 01/27/2014    COMPLEMENTC4 15 08/04/2014    COMPLEMENTC3 91 08/04/2014     Lab Results   Component Value Date    IGGSERUM 865 08/04/2014     08/04/2014    IGM 77 08/04/2014    TSH 1.325 08/04/2014    WBC 4.82 08/04/2014     LYMPH 1.8 08/04/2014    LYMPH 36.7 08/04/2014    RBC 4.39 08/04/2014    HGB 12.0 08/04/2014    HCT 39.8 08/04/2014    MCV 91 08/04/2014     08/04/2014     08/04/2014    K 4.0 08/04/2014    CO2 20 (L) 08/04/2014    BUN 10 08/04/2014    CREATININE 0.7 08/04/2014    CALCIUM 9.0 08/04/2014    AST 18 08/04/2014    ALT 11 08/04/2014       Pertinent imaging results    *Images personally reviewed and interpreted:  10/3/2024  MRI Brain w/wo contrast:  3-4 non-specific T2/FLAIR punctate foci scattered throughout the subcortical white matter    MRI Cervical Spine w/wo contrast:  Disc protrusion at C6-7 with no compression on the cord    MRI Thoracic Spine w/wo contrast:  No cord lesions    Other pertinent studies    11/10/2023  EMG-NCS:  Mild to moderate severity right carpal tunnel syndrome  Mild left carpal tunnel syndrome    Assessment:   Luisa Jesus is a 39 y.o. RH female with POTS/dysautonomia, cardiac arrhythmia, IBS, fibromyalgia, and unidentified rheumatologic/connective tissue disorder who presents for a second opinion on a constellation of neurologic/rheumatologic symptoms that include generalized paresthesias, neuropathic pain, incoordination, imbalance/dizziness, and urinary disturbances. I personally reviewed all of her MRI images which did not show any discrete cord lesions or brain lesions with morphology or topography of multiple sclerosis. We will obtain metabolic work up and EMG-NCS of all 4 extremities to assess for large fiber polyneuropathy. Lumbar imaging to investigate the bladder disturbance and hyperreflexia. I would like to review her outside records. Lastly, tis could all be explained by small fiber neuropathy/autonomic neuropathy related to connective tissue disorder.     1. Paresthesia of both lower extremities    2. Dorsalgia, unspecified    3. Urinary frequency       Plan:     - Celiac Disease Panel; Future  - Copper, Serum; Future  - Zinc; Future  - Vitamin E; Future  -  Vitamin B6; Future  - Protein Electrophoresis, Serum; Future  - Immunofixation Electrophoresis; Future  - EMG W/ ULTRASOUND AND NERVE CONDUCTION TEST 4 Extremities; Future  - MRI Lumbar Spine W WO Cont; Future  - Ambulatory referral/consult to Urogynecology; Future  - recommend increasing Cymbalta to 60 mg daily for better pain control  - will request ophthalmology and ENT records   - Follow up: VV with results    Plan was discussed in detail with the patient, who is in agreement.      Disclaimer: This note was partly generated using dictation software which may occasionally result in transcription errors that are missed on review.      Based on our encounter today, my overall Medical Decision Making is a Level 4     Complexity of Problem: Moderate (1 or more chronic illnesses with exacerbation, progression or treatment side effects)  Complexity of Data: Extensive (Review of >3 unique test results, Ordering >3 unique tests , and Independent interpretation of tests)  Risk of Complications and/or morbidity/mortality of Management: Low risk of morbidity from additional diagnostic testing or treatment          Rylie Bustamante MD    [facility]  10/10/2024

## 2024-10-11 ENCOUNTER — TELEPHONE (OUTPATIENT)
Dept: OBSTETRICS AND GYNECOLOGY | Facility: CLINIC | Age: 39
End: 2024-10-11
Payer: OTHER GOVERNMENT

## 2024-10-11 DIAGNOSIS — R92.8 ABNORMAL MAMMOGRAM OF LEFT BREAST: Primary | ICD-10-CM

## 2024-10-11 LAB
ALBUMIN SERPL ELPH-MCNC: 4.86 G/DL (ref 3.35–5.55)
ALPHA1 GLOB SERPL ELPH-MCNC: 0.24 G/DL (ref 0.17–0.41)
ALPHA2 GLOB SERPL ELPH-MCNC: 0.66 G/DL (ref 0.43–0.99)
B-GLOBULIN SERPL ELPH-MCNC: 0.66 G/DL (ref 0.5–1.1)
GAMMA GLOB SERPL ELPH-MCNC: 0.79 G/DL (ref 0.67–1.58)
INTERPRETATION SERPL IFE-IMP: NORMAL
PROT SERPL-MCNC: 7.2 G/DL (ref 6–8.4)

## 2024-10-11 NOTE — TELEPHONE ENCOUNTER
----- Message from Laurie sent at 10/11/2024 12:24 PM CDT -----  Type:  Orders     Who Called:pt     Does the patient know what this is regarding?:Follow up mammo And breast US   Would the patient rather a call back or a response via [facility]? Call   Best Call Back Number: [phone]  Additional Information:

## 2024-10-15 LAB
A-TOCOPHEROL VIT E SERPL-MCNC: 1688 UG/DL (ref 500–1800)
GLIADIN PEPTIDE IGA SER-ACNC: 0.5 U/ML
GLIADIN PEPTIDE IGG SER-ACNC: <0.6 U/ML
IGA SERPL-MCNC: 188 MG/DL (ref 70–400)
TTG IGA SER-ACNC: <0.2 U/ML
TTG IGG SER-ACNC: <0.6 U/ML

## 2024-10-16 LAB
COPPER SERPL-MCNC: 909 UG/L (ref 810–1990)
PATHOLOGIST INTERPRETATION IFE: NORMAL
PATHOLOGIST INTERPRETATION SPE: NORMAL
PYRIDOXAL SERPL-MCNC: 31 UG/L (ref 5–50)
ZINC SERPL-MCNC: 107 UG/DL (ref 60–130)

## 2024-10-16 RX ORDER — DULOXETIN HYDROCHLORIDE 60 MG/1
60 CAPSULE, DELAYED RELEASE ORAL DAILY
Qty: 30 CAPSULE | Refills: 11 | Status: SHIPPED | OUTPATIENT
Start: 2024-10-16 | End: 2025-10-16

## 2024-10-17 ENCOUNTER — HOSPITAL ENCOUNTER (OUTPATIENT)
Dept: RADIOLOGY | Facility: HOSPITAL | Age: 39
Discharge: HOME | End: 2024-10-17
Attending: STUDENT IN AN ORGANIZED HEALTH CARE EDUCATION/TRAINING PROGRAM
Payer: OTHER GOVERNMENT

## 2024-10-17 DIAGNOSIS — M54.9 DORSALGIA, UNSPECIFIED: ICD-10-CM

## 2024-10-17 DIAGNOSIS — R35.0 URINARY FREQUENCY: ICD-10-CM

## 2024-10-17 DIAGNOSIS — R29.2 HYPERREFLEXIA: ICD-10-CM

## 2024-10-17 PROCEDURE — 25500020 PHARM REV CODE 255

## 2024-10-17 PROCEDURE — 72158 MRI LUMBAR SPINE W/O & W/DYE: CPT | Mod: 26,,, | Performed by: RADIOLOGY

## 2024-10-17 PROCEDURE — 72158 MRI LUMBAR SPINE W/O & W/DYE: CPT | Mod: TC

## 2024-10-17 PROCEDURE — A9585 GADOBUTROL INJECTION: HCPCS

## 2024-10-17 RX ORDER — GADOBUTROL 604.72 MG/ML
INJECTION INTRAVENOUS
Status: COMPLETED
Start: 2024-10-17 | End: 2024-10-17

## 2024-10-17 RX ADMIN — GADOBUTROL 7 ML: 604.72 INJECTION INTRAVENOUS at 04:10

## 2024-10-18 ENCOUNTER — PATIENT MESSAGE (OUTPATIENT)
Dept: OBSTETRICS AND GYNECOLOGY | Facility: CLINIC | Age: 39
End: 2024-10-18
Payer: OTHER GOVERNMENT

## 2024-10-18 DIAGNOSIS — G89.29 CHRONIC MIDLINE LOW BACK PAIN, UNSPECIFIED WHETHER SCIATICA PRESENT: ICD-10-CM

## 2024-10-18 DIAGNOSIS — M47.816 LUMBAR SPONDYLOSIS: Primary | ICD-10-CM

## 2024-10-18 DIAGNOSIS — M54.50 CHRONIC MIDLINE LOW BACK PAIN, UNSPECIFIED WHETHER SCIATICA PRESENT: ICD-10-CM

## 2024-10-18 DIAGNOSIS — R20.2 PARESTHESIA OF BOTH LOWER EXTREMITIES: ICD-10-CM

## 2024-10-18 DIAGNOSIS — R26.9 GAIT DISTURBANCE: ICD-10-CM

## 2024-10-21 DIAGNOSIS — N64.4 BREAST PAIN IN FEMALE: Primary | ICD-10-CM

## 2024-10-22 ENCOUNTER — OFFICE VISIT (OUTPATIENT)
Dept: UROGYNECOLOGY | Facility: CLINIC | Age: 39
End: 2024-10-22
Payer: OTHER GOVERNMENT

## 2024-10-22 DIAGNOSIS — R39.15 URINARY URGENCY: ICD-10-CM

## 2024-10-22 DIAGNOSIS — R35.0 URINARY FREQUENCY: Primary | ICD-10-CM

## 2024-10-22 LAB
BILIRUBIN, UA POC OHS: NEGATIVE
BLOOD, UA POC OHS: NEGATIVE
CLARITY, UA POC OHS: CLEAR
COLOR, UA POC OHS: YELLOW
GLUCOSE, UA POC OHS: NEGATIVE
KETONES, UA POC OHS: NEGATIVE
LEUKOCYTES, UA POC OHS: ABNORMAL
NITRITE, UA POC OHS: NEGATIVE
PH, UA POC OHS: 6
PROTEIN, UA POC OHS: NEGATIVE
SPECIFIC GRAVITY, UA POC OHS: 1.01
UROBILINOGEN, UA POC OHS: 0.2

## 2024-10-22 PROCEDURE — 99213 OFFICE O/P EST LOW 20 MIN: CPT | Mod: PBBFAC,PO | Performed by: NURSE PRACTITIONER

## 2024-10-22 PROCEDURE — 81003 URINALYSIS AUTO W/O SCOPE: CPT | Mod: PBBFAC,PO | Performed by: NURSE PRACTITIONER

## 2024-10-22 PROCEDURE — 99999 PR PBB SHADOW E&M-EST. PATIENT-LVL III: CPT | Mod: PBBFAC,,, | Performed by: NURSE PRACTITIONER

## 2024-10-22 PROCEDURE — 99214 OFFICE O/P EST MOD 30 MIN: CPT | Mod: S$PBB,,, | Performed by: NURSE PRACTITIONER

## 2024-10-22 PROCEDURE — 87086 URINE CULTURE/COLONY COUNT: CPT | Performed by: NURSE PRACTITIONER

## 2024-10-22 PROCEDURE — 99999PBSHW POCT URINALYSIS(INSTRUMENT): Mod: PBBFAC,,,

## 2024-10-22 NOTE — H&P (VIEW-ONLY)
Subjective:       Patient ID: Luisa Jesus is a 39 y.o. female.    Chief Complaint: Urinary Frequency      Luisa Jesus is a 39 y.o. female.  Who is new to me, presents today for consult in regards to urinary frequency and urgency.  She has been having issues with this for about 2 years or so.  She was urinary frequency about every hour during the day.  However she does feel the urge to go about every 15 minutes.  She has nocturia x5 when she is taking melatonin.  If she does not take melatonin then she can get up close to 10 times at night.  She denies any incontinence.  She denies any PVF.  She denies any history of recurrent UTIs.  She drinks mostly water.  She does occasionally have some green tea.  She does drink some coffee during the day.  She has not tried any bladder medications in the past.  She does have issues with abnormal bleeding.  She is following with her OBGYN in this regard.  She does occasionally have some dyspareunia.  She denies any bulging sensation.  She is very anxious to try something to get some relief and to hopefully get some sleep.    Review of Systems   Constitutional:  Negative for activity change, fever and unexpected weight change.   HENT:  Negative for hearing loss.    Eyes:  Negative for visual disturbance.   Respiratory:  Negative for shortness of breath and wheezing.    Cardiovascular:  Negative for chest pain, palpitations and leg swelling.   Gastrointestinal:  Negative for abdominal pain, constipation and diarrhea.   Genitourinary:  Positive for frequency and urgency. Negative for dyspareunia, dysuria, vaginal bleeding and vaginal discharge.   Musculoskeletal:  Negative for gait problem and neck pain.   Skin:  Negative for rash and wound.   Allergic/Immunologic: Negative for immunocompromised state.   Neurological:  Negative for tremors, speech difficulty and weakness.   Hematological:  Does not bruise/bleed easily.   Psychiatric/Behavioral:  Negative for  agitation and confusion.        Objective:      Physical Exam  Vitals reviewed. Exam conducted with a chaperone present.   Constitutional:       General: She is not in acute distress.     Appearance: She is well-developed.   HENT:      Head: Normocephalic and atraumatic.   Neck:      Thyroid: No thyromegaly.   Pulmonary:      Effort: Pulmonary effort is normal. No respiratory distress.   Abdominal:      Palpations: Abdomen is soft.      Tenderness: There is no abdominal tenderness.      Hernia: No hernia is present.   Musculoskeletal:         General: Normal range of motion.      Cervical back: Normal range of motion.   Skin:     General: Skin is warm and dry.      Findings: No rash.   Neurological:      Mental Status: She is alert and oriented to person, place, and time.   Psychiatric:         Mood and Affect: Mood normal.         Behavior: Behavior normal.         Thought Content: Thought content normal.       Pelvic Exam:  Deferred at this time      Assessment:       1. Urinary frequency    2. Urinary urgency        Plan:       Urinary frequency we will send a urine culture as noted below at this time just to be sure that it was negative.  We will have her begin gemtesa 1 tablet by mouth every evening.  -     Ambulatory referral/consult to Urogynecology  -     POCT Urinalysis(Instrument)  -     Urine culture    Urinary urgency begin the gemtesa as noted above.  Patient also encouraged to stop drinking any fluids approximately 2 hours prior to bedtime.  Patient verbalized that she was already working on this.    RTC 1 month

## 2024-10-23 ENCOUNTER — HOSPITAL ENCOUNTER (OUTPATIENT)
Dept: RADIOLOGY | Facility: HOSPITAL | Age: 39
Discharge: HOME | End: 2024-10-23
Attending: PHYSICIAN ASSISTANT
Payer: OTHER GOVERNMENT

## 2024-10-23 DIAGNOSIS — N64.4 BREAST PAIN IN FEMALE: ICD-10-CM

## 2024-10-23 LAB
BACTERIA UR CULT: NORMAL
BACTERIA UR CULT: NORMAL

## 2024-10-23 PROCEDURE — 76641 ULTRASOUND BREAST COMPLETE: CPT | Mod: 26,50,, | Performed by: RADIOLOGY

## 2024-10-23 PROCEDURE — 76641 ULTRASOUND BREAST COMPLETE: CPT | Mod: TC,50

## 2024-11-01 DIAGNOSIS — M51.369 DEGENERATION OF INTERVERTEBRAL DISC OF LUMBAR REGION, UNSPECIFIED WHETHER PAIN PRESENT: Primary | ICD-10-CM

## 2024-11-04 ENCOUNTER — OFFICE VISIT (OUTPATIENT)
Dept: ORTHOPEDICS | Facility: CLINIC | Age: 39
End: 2024-11-04
Payer: OTHER GOVERNMENT

## 2024-11-04 ENCOUNTER — HOSPITAL ENCOUNTER (OUTPATIENT)
Dept: RADIOLOGY | Facility: HOSPITAL | Age: 39
Discharge: HOME | End: 2024-11-04
Attending: ORTHOPAEDIC SURGERY
Payer: OTHER GOVERNMENT

## 2024-11-04 ENCOUNTER — PATIENT MESSAGE (OUTPATIENT)
Dept: ORTHOPEDICS | Facility: CLINIC | Age: 39
End: 2024-11-04

## 2024-11-04 ENCOUNTER — TELEPHONE (OUTPATIENT)
Dept: PAIN MEDICINE | Facility: CLINIC | Age: 39
End: 2024-11-04
Payer: OTHER GOVERNMENT

## 2024-11-04 VITALS — HEIGHT: 63 IN | WEIGHT: 173.94 LBS | BODY MASS INDEX: 30.82 KG/M2

## 2024-11-04 DIAGNOSIS — M51.369 DEGENERATION OF INTERVERTEBRAL DISC OF LUMBAR REGION, UNSPECIFIED WHETHER PAIN PRESENT: ICD-10-CM

## 2024-11-04 DIAGNOSIS — M47.816 LUMBAR SPONDYLOSIS: ICD-10-CM

## 2024-11-04 DIAGNOSIS — R26.9 GAIT DISTURBANCE: ICD-10-CM

## 2024-11-04 DIAGNOSIS — G89.29 CHRONIC MIDLINE LOW BACK PAIN, UNSPECIFIED WHETHER SCIATICA PRESENT: ICD-10-CM

## 2024-11-04 DIAGNOSIS — M54.2 NECK PAIN: Primary | ICD-10-CM

## 2024-11-04 DIAGNOSIS — M54.50 CHRONIC MIDLINE LOW BACK PAIN, UNSPECIFIED WHETHER SCIATICA PRESENT: ICD-10-CM

## 2024-11-04 DIAGNOSIS — M54.16 LUMBAR RADICULOPATHY: Primary | ICD-10-CM

## 2024-11-04 PROCEDURE — 72110 X-RAY EXAM L-2 SPINE 4/>VWS: CPT | Mod: 26,,, | Performed by: RADIOLOGY

## 2024-11-04 PROCEDURE — 99999 PR PBB SHADOW E&M-EST. PATIENT-LVL III: CPT | Mod: PBBFAC,,, | Performed by: ORTHOPAEDIC SURGERY

## 2024-11-04 PROCEDURE — 99213 OFFICE O/P EST LOW 20 MIN: CPT | Mod: PBBFAC,25 | Performed by: ORTHOPAEDIC SURGERY

## 2024-11-04 PROCEDURE — 99214 OFFICE O/P EST MOD 30 MIN: CPT | Mod: S$PBB,,, | Performed by: ORTHOPAEDIC SURGERY

## 2024-11-04 PROCEDURE — 72110 X-RAY EXAM L-2 SPINE 4/>VWS: CPT | Mod: TC

## 2024-11-04 NOTE — PROGRESS NOTES
DATE: 11/4/2024  PATIENT: Luisa Jesus    Supervising Physician: Gentry Johnson M.D.    CHIEF COMPLAINT: neck and bilateral arm pain, low back and bilateral leg pain    HISTORY:  Luisa Jesus is a 39 y.o. female  here for initial evaluation of low back and bilateral leg pain (Back - 5, Leg - 5). The pain has been present for a year, worsening over time. The patient describes the pain as aching and shooting down both legs.  The pain is worse with activity and improved by nothing. There is positive associated numbness and tingling. There is positive subjective weakness. Prior treatments have included PT, but no ESIs or surgery.    The patient also has complaints of neck and bilateral arm pain (Neck - 5, Arm - 5).  The pain has been present for years, worsening over time. The patient describes the pain as sharp at the base of her skull with radiating pain down both arms. The pain is worse with activity and improved by nothing. There is positive associated numbness and tingling. There is positive subjective weakness. Pt had  C5-6 ACDF >10 years ago and said it did NOT help with her radiating arm pain. She denies treatment since then.    Of note, she is currently being worked up for autoimmune disorders. She is taking cymbalta and humira. She was recently seen by neurology to r/o MS. MRIs did not show obvious lesions and she is currently scheduled for EMG of all 4 extremities to assess for large fiber polyneuropathy.     The patient endorses myelopathic symptoms such as handwriting changes or difficulty with buttons/coins/keys. Endorses perineal paresthesias, bowel/bladder dysfunction.    PAST MEDICAL/SURGICAL HISTORY:  Past Medical History:   Diagnosis Date    Allergy     Atrial fibrillation 10/01/2023    aproxx    IBS (irritable bowel syndrome)     SVT (supraventricular tachycardia)     Thrush 01/22/2015    recurrent     Past Surgical History:   Procedure Laterality Date    CHOLECYSTECTOMY      Dx  Laparoscopy      presumed endometriosis    SINUS SURGERY  01/01/2007    SPINE SURGERY      C spine    TONSILLECTOMY      VENTRICULAR ABLATION SURGERY      WISDOM TOOTH EXTRACTION         Medications:   Current Outpatient Medications on File Prior to Visit   Medication Sig Dispense Refill    aspirin 81 MG Chew       CHOLECALCIFEROL, VITAMIN D3, (VITAMIN D3 ORAL) Take by mouth.      diltiaZEM (CARDIZEM CD) 120 MG Cp24 120 mg.      DULoxetine (CYMBALTA) 60 MG capsule Take 1 capsule (60 mg total) by mouth once daily. 30 capsule 11    HUMIRA,CF, PEN 40 mg/0.4 mL PnKt Inject 40 mg into the skin every 14 (fourteen) days.      magnesium oxide 200 mg magnesium Chew Take by mouth.      meloxicam (MOBIC) 15 MG tablet Take 1 tablet (15 mg total) by mouth once daily. 30 tablet 0    multivit with calcium,iron,min (MULTIVITAMIN-CALCIUM AND IRON ORAL)        No current facility-administered medications on file prior to visit.       Social History:   Social History     Socioeconomic History    Marital status:     Number of children: 3   Occupational History    Occupation: back in school for nutrition   Tobacco Use    Smoking status: Never    Smokeless tobacco: Never   Substance and Sexual Activity    Alcohol use: Yes     Comment: rarely    Drug use: No    Sexual activity: Yes     Partners: Male     Birth control/protection: None     Social Drivers of Health     Financial Resource Strain: Medium Risk (10/30/2024)    Overall Financial Resource Strain (CARDIA)     Difficulty of Paying Living Expenses: Somewhat hard   Food Insecurity: Food Insecurity Present (10/30/2024)    Hunger Vital Sign     Worried About Running Out of Food in the Last Year: Sometimes true     Ran Out of Food in the Last Year: Never true   Physical Activity: Sufficiently Active (10/30/2024)    Exercise Vital Sign     Days of Exercise per Week: 6 days     Minutes of Exercise per Session: 40 min   Stress: Stress Concern Present (10/30/2024)    [place]  "[place] [employer] - Occupational Stress Questionnaire     Feeling of Stress : To some extent   Housing Stability: High Risk (10/30/2024)    Housing Stability Vital Sign     Unable to Pay for Housing in the Last Year: Yes       REVIEW OF SYSTEMS:  Constitution: Negative. Negative for chills, fever and night sweats.   Cardiovascular: Negative for chest pain and syncope.   Respiratory: Negative for cough and shortness of breath.   Gastrointestinal: See HPI. Negative for nausea/vomiting. Negative for abdominal pain.  Genitourinary: See HPI. Negative for discoloration or dysuria.  Skin: Negative for dry skin, itching and rash.   Hematologic/Lymphatic: Negative for bleeding problem. Does not bruise/bleed easily.   Musculoskeletal: Negative for falls and muscle weakness.   Neurological: See HPI. No seizures.   Endocrine: Negative for polydipsia, polyphagia and polyuria.   Allergic/Immunologic: Negative for hives and persistent infections.     EXAM:  Ht 5' 3" (1.6 m)   Wt 78.9 kg (173 lb 15.1 oz)   LMP 09/17/2024   BMI 30.81 kg/m²     PHYSICAL EXAMINATION:    General: The patient is a very pleasant 39 y.o. female in no apparent distress, the patient is oriented to person, place and time.  Psych: Normal mood and affect  HEENT: Vision grossly intact, hearing intact to the spoken word.  Lungs: Respirations unlabored.  Gait: Normal station and gait, no difficulty with toe or heel walk.   Skin: Cervical skin and dorsal lumbar skin negative for rashes, lesions, hairy patches and surgical scars.    Range of motion: Cervical and lumbar range of motion is acceptable. There is mild tenderness to palpation of the paracervical muscles.  There is mild lumbar tenderness to palpation.  Spinal Balance: Global saggital and coronal spinal balance acceptable, no significant for scoliosis and kyphosis.  Musculoskeletal: No pain with the range of motion of the bilateral shoulders and elbows. Normal bulk and contour of the " "bilateral hands.  No pain with the range of motion of the bilateral hips. Mild bilateral trochanteric tenderness to palpation.  Vascular: Bilateral upper and lower extremities warm and well perfused, radial pulses 2+ bilaterally, dorsalis pedis pulses 2+ bilaterally.  Neurological: Normal strength and tone in all major motor groups in the bilateral upper and lower extremities. Normal sensation to light touch in the C5-T1 and L2-S1 dermatomes bilaterally.  Deep tendon reflexes symmetric 3+ in the bilateral upper and lower extremities.  POSITIVE Inverted Radial Reflex and EQUIVOCAL Rocha's bilaterally. Negative Babinski bilaterally. Negative straight leg raise bilaterally.     IMAGING:   Today I personally reviewed MRI cervical (outside report) that details mild neural foraminal narrowing at C6-7. No central stenosis    AP, Lat and Flex/Ex upright lumbar spine films demonstrate mild degenerative changes.    MRI lumbar demonstrates mild central stenosis at L4-5, neural foraminal narrowing at L3-5.    Body mass index is 30.81 kg/m².    Hemoglobin A1C   Date Value Ref Range Status   09/22/2014 5.2 4.5 - 6.2 % Final         ASSESSMENT/PLAN:    Luisa Hannah" was seen today for neck pain, leg pain and low-back pain.    Diagnoses and all orders for this visit:    Lumbar spondylosis  -     Ambulatory referral/consult to Spine Surgery    Chronic midline low back pain, unspecified whether sciatica present  -     Ambulatory referral/consult to Spine Surgery    Gait disturbance  -     Ambulatory referral/consult to Spine Surgery        Today we discussed at length all of the different treatment options including anti-inflammatories, acetaminophen, rest, ice, heat, physical therapy including strengthening and stretching exercises, home exercises, ROM, aerobic conditioning, aqua therapy, other modalities including ultrasound, massage, and dry needling, epidural steroid injections and finally surgical intervention.      Pt " presents with chronic cervical and lumbar radiculopathy. Failure of conservative rx. Will order L4-5 JANETH with pain management. Will have pt fu in pain management clinic to discuss injections for axial neck pain. Pt will fu if pain persists.

## 2024-11-04 NOTE — TELEPHONE ENCOUNTER
Level of Service:64833     [place] OFFICE/OUTPT VISIT, TL PERALTA, 60-74 MIN      Types of orders made on 11/04/2024: Outpatient Referral, Procedure Request      Order Date:11/4/2024   Ordering User:ALONA PIERRE [[id number]]   Encounter Provider:Alona Pierre PA-C [[id number]]   Authorizing Provider: Alona Pierre PA-C [[id number]]   Supervising Provider:ALEXANDRU WOLFE [[id number]]   Type of Supervision:Supervision Required   Department:[facility][[id number]]      Common Order Information   Procedure -> Epidural Injection (specify level) Cmt: L4-5      Order Specific Information   Order: Procedure Order to Pain Management [Custom: [id number]]  Order #:          [id number]: 1 FUTURE     Priority: Routine  Class: Clinic Performed     Future Order Information       Expires on:11/04/2025            Expected by:11/04/2024                   Comment:Please schedule follow up in clinic as well to discuss injections for             axial neck pain. Will upload outside MRI images into chart.     Associated Diagnoses       M54.50, G89.29 Chronic midline low back pain, unspecified whether sciatica       present       Facility Name: -> [place]              Priority: Routine  Class: Clinic Performed     Future Order Information       Expires on:11/04/2025            Expected by:11/04/2024                   Comment:Please schedule follow up in clinic as well to discuss injections for             axial neck pain. Will upload outside MRI images into chart.   Ok to schedule?

## 2024-11-05 ENCOUNTER — PATIENT MESSAGE (OUTPATIENT)
Dept: ORTHOPEDICS | Facility: CLINIC | Age: 39
End: 2024-11-05
Payer: OTHER GOVERNMENT

## 2024-11-06 ENCOUNTER — HOSPITAL ENCOUNTER (OUTPATIENT)
Dept: RADIOLOGY | Facility: HOSPITAL | Age: 39
Discharge: HOME | End: 2024-11-06
Attending: PHYSICIAN ASSISTANT
Payer: OTHER GOVERNMENT

## 2024-11-06 DIAGNOSIS — R92.8 ABNORMAL MAMMOGRAM OF LEFT BREAST: ICD-10-CM

## 2024-11-06 PROCEDURE — 77065 DX MAMMO INCL CAD UNI: CPT | Mod: 26,LT,, | Performed by: RADIOLOGY

## 2024-11-06 PROCEDURE — 19083 BX BREAST 1ST LESION US IMAG: CPT | Mod: LT,,, | Performed by: RADIOLOGY

## 2024-11-06 PROCEDURE — 77065 DX MAMMO INCL CAD UNI: CPT | Mod: TC,LT

## 2024-11-06 PROCEDURE — 88305 TISSUE EXAM BY PATHOLOGIST: CPT | Mod: 59 | Performed by: STUDENT IN AN ORGANIZED HEALTH CARE EDUCATION/TRAINING PROGRAM

## 2024-11-06 PROCEDURE — 27200937 US BREAST BIOPSY WITH IMAGING 1ST SITE LEFT

## 2024-11-06 PROCEDURE — 77061 BREAST TOMOSYNTHESIS UNI: CPT | Mod: 26,LT,, | Performed by: RADIOLOGY

## 2024-11-06 PROCEDURE — 19084 BX BREAST ADD LESION US IMAG: CPT | Mod: LT,,, | Performed by: RADIOLOGY

## 2024-11-06 PROCEDURE — 27200940 US BREAST BIOPSY WITH IMAGING EA ADDITIONAL

## 2024-11-08 ENCOUNTER — PATIENT MESSAGE (OUTPATIENT)
Dept: OBSTETRICS AND GYNECOLOGY | Facility: CLINIC | Age: 39
End: 2024-11-08
Payer: OTHER GOVERNMENT

## 2024-11-08 ENCOUNTER — PATIENT MESSAGE (OUTPATIENT)
Dept: NEUROLOGY | Facility: CLINIC | Age: 39
End: 2024-11-08
Payer: OTHER GOVERNMENT

## 2024-11-08 LAB
FINAL PATHOLOGIC DIAGNOSIS: NORMAL
GROSS: NORMAL
Lab: NORMAL

## 2024-11-12 ENCOUNTER — PATIENT MESSAGE (OUTPATIENT)
Dept: UROGYNECOLOGY | Facility: CLINIC | Age: 39
End: 2024-11-12
Payer: OTHER GOVERNMENT

## 2024-11-12 NOTE — TELEPHONE ENCOUNTER
It was a possibility that her bladder is irritated/inflamed.  We could have her come in to do an anesthetic challenge to see if this makes a difference.

## 2024-11-13 DIAGNOSIS — R92.8 ABNORMAL MAMMOGRAM OF LEFT BREAST: Primary | ICD-10-CM

## 2024-11-15 ENCOUNTER — HOSPITAL ENCOUNTER (OUTPATIENT)
Facility: HOSPITAL | Age: 39
Discharge: HOME | End: 2024-11-15
Attending: STUDENT IN AN ORGANIZED HEALTH CARE EDUCATION/TRAINING PROGRAM | Admitting: STUDENT IN AN ORGANIZED HEALTH CARE EDUCATION/TRAINING PROGRAM
Payer: OTHER GOVERNMENT

## 2024-11-15 ENCOUNTER — TELEPHONE (OUTPATIENT)
Dept: OBSTETRICS AND GYNECOLOGY | Facility: CLINIC | Age: 39
End: 2024-11-15
Payer: OTHER GOVERNMENT

## 2024-11-15 DIAGNOSIS — M54.16 LUMBAR RADICULITIS: ICD-10-CM

## 2024-11-15 LAB
B-HCG UR QL: NEGATIVE
CTP QC/QA: YES

## 2024-11-15 PROCEDURE — 62323 NJX INTERLAMINAR LMBR/SAC: CPT | Performed by: STUDENT IN AN ORGANIZED HEALTH CARE EDUCATION/TRAINING PROGRAM

## 2024-11-15 PROCEDURE — A4216 STERILE WATER/SALINE, 10 ML: HCPCS | Performed by: STUDENT IN AN ORGANIZED HEALTH CARE EDUCATION/TRAINING PROGRAM

## 2024-11-15 PROCEDURE — 81025 URINE PREGNANCY TEST: CPT | Performed by: STUDENT IN AN ORGANIZED HEALTH CARE EDUCATION/TRAINING PROGRAM

## 2024-11-15 PROCEDURE — 63600175 PHARM REV CODE 636 W HCPCS: Performed by: STUDENT IN AN ORGANIZED HEALTH CARE EDUCATION/TRAINING PROGRAM

## 2024-11-15 PROCEDURE — 25000003 PHARM REV CODE 250: Performed by: STUDENT IN AN ORGANIZED HEALTH CARE EDUCATION/TRAINING PROGRAM

## 2024-11-15 PROCEDURE — 25500020 PHARM REV CODE 255: Performed by: STUDENT IN AN ORGANIZED HEALTH CARE EDUCATION/TRAINING PROGRAM

## 2024-11-15 PROCEDURE — 62323 NJX INTERLAMINAR LMBR/SAC: CPT | Mod: ,,, | Performed by: STUDENT IN AN ORGANIZED HEALTH CARE EDUCATION/TRAINING PROGRAM

## 2024-11-15 RX ORDER — SODIUM CHLORIDE 9 MG/ML
INJECTION, SOLUTION INTRAMUSCULAR; INTRAVENOUS; SUBCUTANEOUS
Status: DISCONTINUED | OUTPATIENT
Start: 2024-11-15 | End: 2024-11-15 | Stop reason: HOSPADM

## 2024-11-15 RX ORDER — LIDOCAINE HYDROCHLORIDE 10 MG/ML
INJECTION, SOLUTION EPIDURAL; INFILTRATION; INTRACAUDAL; PERINEURAL
Status: DISCONTINUED | OUTPATIENT
Start: 2024-11-15 | End: 2024-11-15 | Stop reason: HOSPADM

## 2024-11-15 RX ORDER — METHYLPREDNISOLONE ACETATE 80 MG/ML
INJECTION, SUSPENSION INTRA-ARTICULAR; INTRALESIONAL; INTRAMUSCULAR; SOFT TISSUE
Status: DISCONTINUED | OUTPATIENT
Start: 2024-11-15 | End: 2024-11-15 | Stop reason: HOSPADM

## 2024-11-15 RX ORDER — ALPRAZOLAM 0.25 MG/1
1 TABLET, ORALLY DISINTEGRATING ORAL ONCE AS NEEDED
Status: COMPLETED | OUTPATIENT
Start: 2024-11-15 | End: 2024-11-15

## 2024-11-15 RX ORDER — LIDOCAINE HYDROCHLORIDE 10 MG/ML
1 INJECTION, SOLUTION EPIDURAL; INFILTRATION; INTRACAUDAL; PERINEURAL ONCE
Status: DISCONTINUED | OUTPATIENT
Start: 2024-11-15 | End: 2024-11-15 | Stop reason: HOSPADM

## 2024-11-15 RX ORDER — SODIUM CHLORIDE, SODIUM LACTATE, POTASSIUM CHLORIDE, CALCIUM CHLORIDE 600; 310; 30; 20 MG/100ML; MG/100ML; MG/100ML; MG/100ML
INJECTION, SOLUTION INTRAVENOUS CONTINUOUS
Status: DISCONTINUED | OUTPATIENT
Start: 2024-11-15 | End: 2024-11-15 | Stop reason: HOSPADM

## 2024-11-15 RX ADMIN — ALPRAZOLAM 1 MG: 0.25 TABLET, ORALLY DISINTEGRATING ORAL at 09:11

## 2024-11-15 NOTE — INTERVAL H&P NOTE
The patient has been examined and the H&P has been reviewed:    I concur with the findings and no changes have occurred since H&P was written.    Anesthesia/Surgery risks, benefits and alternative options discussed and understood by patient/family.    RRR/CTABL      There are no hospital problems to display for this patient.

## 2024-11-15 NOTE — DISCHARGE SUMMARY
[facility]  Discharge Note  Short Stay     Admit Date: 11/15/2024    Discharge Date: 11/15/2024     Attending Physician: Konstantin Linder MD    Diagnoses:  Lumbar radiculopathy    Discharged Condition: Good     Hospital Course: Patient was admitted for an outpatient interventional pain management procedure and tolerated the procedure well with no complications.     Final Diagnoses: Same as principal problem.     Disposition: Home or Self Care     Follow up/Patient Instructions:   Follow-up in 4 weeks unless otherwise instructed. May return sooner as needed.       Reconciled Medications:     Medication List        CONTINUE taking these medications      aspirin 81 MG Chew     CARDIZEM  MG Cp24  Generic drug: diltiaZEM  120 mg.     DULoxetine 60 MG capsule  Commonly known as: CYMBALTA  Take 1 capsule (60 mg total) by mouth once daily.     HUMIRA(CF) PEN 40 mg/0.4 mL Pnkt  Generic drug: adalimumab  Inject 40 mg into the skin every 14 (fourteen) days.     magnesium oxide 200 mg magnesium Chew  Take by mouth.     meloxicam 15 MG tablet  Commonly known as: MOBIC  Take 1 tablet (15 mg total) by mouth once daily.     MULTIVITAMIN-CALCIUM AND IRON ORAL     VITAMIN D3 ORAL  Take by mouth.             Discharge Procedure Orders (must include Diet, Follow-up, Activity)   Ice to affected area   Order Comments: 20 minutes of ice or until area numb to the touch if area is sore 2-3 times per day as needed     No driving until:   Order Comments: Until following day     No dressing needed     Notify your health care provider if you experience any of the following:  temperature >100.4     Notify your health care provider if you experience any of the following:  persistent nausea and vomiting or diarrhea     Notify your health care provider if you experience any of the following:  severe uncontrolled pain     Notify your health care provider if you experience any of the following:  redness, tenderness, or signs of  infection (pain, swelling, redness, odor or green/yellow discharge around incision site)     Notify your health care provider if you experience any of the following:  difficulty breathing or increased cough     Notify your health care provider if you experience any of the following:  severe persistent headache     Notify your health care provider if you experience any of the following:  worsening rash     Notify your health care provider if you experience any of the following:  persistent dizziness, light-headedness, or visual disturbances     Notify your health care provider if you experience any of the following:  increased confusion or weakness     Shower on day dressing removed (No bath)       Konstantin Linder MD  Interventional Pain Medicine / Physical Medicine & Rehabilitation

## 2024-11-15 NOTE — OP NOTE
Lumbar Interlaminar Epidural Steroid Injection under Fluoroscopic Guidance    The procedure, risks, benefits, and options were discussed with the patient. There are no contraindications to the procedure. The patent expressed understanding and agreed to the procedure. Informed written consent was obtained prior to the start of the procedure and can be found in the patient's chart.    PATIENT NAME: Luisa Jesus   MRN: [id number]     DATE OF PROCEDURE: 11/15/2024    PROCEDURE: Lumbar Interlaminar Epidural Steroid Injection L4/L5 under Fluoroscopic Guidance    PRE-OP DIAGNOSIS: Lumbar radiculopathy [M54.16] Lumbar radiculopathy [M54.16]    POST-OP DIAGNOSIS: Same    PHYSICIAN: Konstantin Linder MD    ASSISTANTS: none     MEDICATIONS INJECTED: Preservative-free depomedrol 80mg with 4cc of bupivacaine 0.25%    LOCAL ANESTHETIC INJECTED: Xylocaine 1%     SEDATION: None    ESTIMATED BLOOD LOSS: None    COMPLICATIONS: None    TECHNIQUE: Time-out was performed to identify the patient and procedure to be performed. With the patient laying in a prone position, the surgical area was prepped and draped in the usual sterile fashion using ChloraPrep and a fenestrated drape. The level was determined under fluoroscopy guidance. Skin anesthesia was achieved by injecting Lidocaine 2% over the injection site. The interlaminar space was then approached with a 20 gauge,  3.5 inch Tuohy needle that was introduced under fluoroscopic guidance in the AP, lateral and/or contralateral oblique imaging. Once the Ligamentum flavum was encountered loss of resistance to saline was used to enter the epidural space. With positive loss of resistance and negative aspiration for CSF or Blood, contrast dye Omnipaque (300mg/mL) was injected to confirm placement and there was no vascular runoff. 5 mL of the medication mixture listed above was injected slowly. Displacement of the radio opaque contrast after injection of the medication confirmed that the  medication went into the area of the epidural space. The needles were removed and bleeding was nil. A sterile dressing was applied. No specimens collected. The patient tolerated the procedure well.       The patient was monitored after the procedure in the recovery area. They were given post-procedure and discharge instructions to follow at home. The patient was discharged in a stable condition.        Konstantin Linder MD

## 2024-11-15 NOTE — TELEPHONE ENCOUNTER
----- Message from Alisson Cook PA-C sent at 11/13/2024  2:44 PM CST -----  Please schedule left breast diagnostic mammogram/US in 6 months. Thanks!

## 2024-11-18 VITALS
BODY MASS INDEX: 30.82 KG/M2 | HEART RATE: 73 BPM | WEIGHT: 173.94 LBS | HEIGHT: 63 IN | TEMPERATURE: 98 F | OXYGEN SATURATION: 100 % | DIASTOLIC BLOOD PRESSURE: 77 MMHG | RESPIRATION RATE: 18 BRPM | SYSTOLIC BLOOD PRESSURE: 118 MMHG

## 2024-11-19 ENCOUNTER — TELEPHONE (OUTPATIENT)
Dept: UROGYNECOLOGY | Facility: CLINIC | Age: 39
End: 2024-11-19

## 2024-11-19 ENCOUNTER — OFFICE VISIT (OUTPATIENT)
Dept: UROGYNECOLOGY | Facility: CLINIC | Age: 39
End: 2024-11-19
Payer: OTHER GOVERNMENT

## 2024-11-19 DIAGNOSIS — R35.0 URINARY FREQUENCY: Primary | ICD-10-CM

## 2024-11-19 DIAGNOSIS — N39.41 URGE INCONTINENCE OF URINE: ICD-10-CM

## 2024-11-19 DIAGNOSIS — R39.15 URINARY URGENCY: ICD-10-CM

## 2024-11-19 LAB
BILIRUBIN, UA POC OHS: NEGATIVE
BLOOD, UA POC OHS: NEGATIVE
CLARITY, UA POC OHS: CLEAR
COLOR, UA POC OHS: YELLOW
GLUCOSE, UA POC OHS: NEGATIVE
KETONES, UA POC OHS: NEGATIVE
LEUKOCYTES, UA POC OHS: NEGATIVE
NITRITE, UA POC OHS: NEGATIVE
PH, UA POC OHS: 7
PROTEIN, UA POC OHS: NEGATIVE
SPECIFIC GRAVITY, UA POC OHS: 1.02
UROBILINOGEN, UA POC OHS: 0.2

## 2024-11-19 PROCEDURE — 51700 IRRIGATION OF BLADDER: CPT | Mod: PBBFAC,PO | Performed by: NURSE PRACTITIONER

## 2024-11-19 PROCEDURE — 99999PBSHW PR PBB SHADOW TECHNICAL ONLY FILED TO HB: Mod: PBBFAC,,,

## 2024-11-19 PROCEDURE — 99999PBSHW POCT URINALYSIS(INSTRUMENT): Mod: PBBFAC,,,

## 2024-11-19 PROCEDURE — 81003 URINALYSIS AUTO W/O SCOPE: CPT | Mod: PBBFAC,PO | Performed by: NURSE PRACTITIONER

## 2024-11-19 PROCEDURE — 99213 OFFICE O/P EST LOW 20 MIN: CPT | Mod: PBBFAC,PO | Performed by: NURSE PRACTITIONER

## 2024-11-19 PROCEDURE — 99999 PR PBB SHADOW E&M-EST. PATIENT-LVL III: CPT | Mod: PBBFAC,,, | Performed by: NURSE PRACTITIONER

## 2024-11-19 RX ORDER — LIDOCAINE HYDROCHLORIDE 20 MG/ML
20 INJECTION, SOLUTION INFILTRATION; PERINEURAL
Status: COMPLETED | OUTPATIENT
Start: 2024-11-19 | End: 2024-11-19

## 2024-11-19 RX ADMIN — LIDOCAINE HYDROCHLORIDE 20 ML: 20 INJECTION, SOLUTION INFILTRATION; PERINEURAL at 02:11

## 2024-11-19 NOTE — PROGRESS NOTES
Subjective:       Patient ID: Luisa Jesus is a 39 y.o. female.    Chief Complaint: anesthetic challenge      Luisa Jesus is a 39 y.o. female.  Who presents today for anesthetic challenge.  She was last seen in our office on 10/22/2024.  At that particular visit we tried gemtesa to see if this would help with her urinary frequency and urgency.  She did take the medication but did not feel that it did anything for her symptoms.  She feels that her frequency and urgency are the same.  She was waking up several times at night and now she is finding that her panties are damp at night.  She feels that her bladder is inflamed and irritated.  She does have dysuria.  She was amenable to the idea of doing [facility]X resolve urine testing.  This will be sent off today.  She was also amenable to the idea of trying the anesthetic challenge to see if we can determine if her symptoms are more bladder or elsewhere in the pelvic area.  She has been having issues with abnormal uterine bleeding.  She has been trying to follow-up with her OBGYN in this regard.  She was also having autoimmune issues that she is trying to get to the root of.  She denies any other acute complaints/concerns at this time is ready to proceed with the instillation    Review of Systems   Constitutional:  Negative for activity change, fever and unexpected weight change.   HENT:  Negative for hearing loss.    Eyes:  Negative for visual disturbance.   Respiratory:  Negative for shortness of breath and wheezing.    Cardiovascular:  Negative for chest pain, palpitations and leg swelling.   Gastrointestinal:  Negative for abdominal pain, constipation and diarrhea.   Genitourinary:  Positive for dysuria, frequency and urgency. Negative for dyspareunia, vaginal bleeding and vaginal discharge.   Musculoskeletal:  Negative for gait problem and neck pain.   Skin:  Negative for rash and wound.   Allergic/Immunologic: Negative for immunocompromised state.    Neurological:  Negative for tremors, speech difficulty and weakness.   Hematological:  Does not bruise/bleed easily.   Psychiatric/Behavioral:  Negative for agitation and confusion.        Objective:      Physical Exam  Vitals reviewed. Exam conducted with a chaperone present.   Constitutional:       General: She is not in acute distress.     Appearance: She is well-developed.   HENT:      Head: Normocephalic and atraumatic.   Neck:      Thyroid: No thyromegaly.   Pulmonary:      Effort: Pulmonary effort is normal. No respiratory distress.   Abdominal:      Palpations: Abdomen is soft.      Tenderness: There is abdominal tenderness in the right lower quadrant and suprapubic area.      Hernia: No hernia is present.   Musculoskeletal:         General: Normal range of motion.      Cervical back: Normal range of motion.   Skin:     General: Skin is warm and dry.      Findings: No rash.   Neurological:      Mental Status: She is alert and oriented to person, place, and time.   Psychiatric:         Mood and Affect: Mood normal.         Behavior: Behavior normal.         Thought Content: Thought content normal.       Pelvic Exam:  V: No lesions. No palpable nodes.   Va:  No discharge or bleeding.  Good length and support.  Meatus:No caruncle or stenosis  Urethra: Non tender. No suburethral masses.  Cx/Cuff: Normal   Uterus:  Well supported  Ad: No mass or tenderness.  Levators :Symmetrical. Normal tone. tender on the right.  BL:  Mildly tender  RV: No hemorrhoids.      Assessment:       1. Urinary frequency    2. Urinary urgency    3. Urge incontinence of urine        Procedure note- After betadine irrigation of the urethra, lidocaine instilled via urojet.   A #14 [place] red rubber tip catheter was inserted into the bladder.  20 mls of residual urine noted.  20 mls of 2% lidocaine instilled into bladder.  Pt instructed to hold mixture for at least 1 hour prior to voiding.  Pt verbalized understanding.           Plan:        Urinary frequency we will send her urine for PCR testing to [facility] resolve.  Trial of instillation as noted above  -     POCT Urinalysis(Instrument)  -     LIDOcaine HCL 20 mg/ml (2%) injection 20 mL    Urinary urgency trial of instillation as noted above    Urge incontinence of urine monitor    RTC 2 weeks

## 2024-11-20 NOTE — TELEPHONE ENCOUNTER
Think she would benefit from trying a series of instillations to see if we can get her frequency a little bit more under control

## 2024-11-20 NOTE — TELEPHONE ENCOUNTER
Called and left message that she can call us back t let us know how she is feeling after the instillation.

## 2024-11-20 NOTE — TELEPHONE ENCOUNTER
Called and spoke to patient and she states that the urgency was gone, but still has the frequency, still had to get up at night. She had held the installation for 1 hour and 10 minutes.

## 2024-11-22 ENCOUNTER — PATIENT MESSAGE (OUTPATIENT)
Dept: UROGYNECOLOGY | Facility: CLINIC | Age: 39
End: 2024-11-22
Payer: OTHER GOVERNMENT

## 2024-11-25 ENCOUNTER — OFFICE VISIT (OUTPATIENT)
Dept: CARDIOLOGY | Facility: CLINIC | Age: 39
End: 2024-11-25
Payer: OTHER GOVERNMENT

## 2024-11-25 VITALS
HEART RATE: 102 BPM | HEIGHT: 63 IN | DIASTOLIC BLOOD PRESSURE: 86 MMHG | SYSTOLIC BLOOD PRESSURE: 134 MMHG | OXYGEN SATURATION: 94 % | BODY MASS INDEX: 31.22 KG/M2 | WEIGHT: 176.19 LBS

## 2024-11-25 DIAGNOSIS — Z87.898 HISTORY OF SYNCOPE: ICD-10-CM

## 2024-11-25 DIAGNOSIS — Z98.890 HX OF PRIOR ABLATION TREATMENT: ICD-10-CM

## 2024-11-25 DIAGNOSIS — I47.10 PAROXYSMAL SVT (SUPRAVENTRICULAR TACHYCARDIA): ICD-10-CM

## 2024-11-25 DIAGNOSIS — Z56.6 STRESS AT WORK: ICD-10-CM

## 2024-11-25 DIAGNOSIS — R00.2 PALPITATIONS: ICD-10-CM

## 2024-11-25 DIAGNOSIS — G90.A POTS (POSTURAL ORTHOSTATIC TACHYCARDIA SYNDROME): Primary | ICD-10-CM

## 2024-11-25 DIAGNOSIS — R06.09 DOE (DYSPNEA ON EXERTION): ICD-10-CM

## 2024-11-25 LAB
OHS QRS DURATION: 72 MS
OHS QTC CALCULATION: 453 MS

## 2024-11-25 PROCEDURE — 99214 OFFICE O/P EST MOD 30 MIN: CPT | Mod: PBBFAC | Performed by: INTERNAL MEDICINE

## 2024-11-25 PROCEDURE — 93005 ELECTROCARDIOGRAM TRACING: CPT | Mod: PBBFAC | Performed by: INTERNAL MEDICINE

## 2024-11-25 PROCEDURE — 99205 OFFICE O/P NEW HI 60 MIN: CPT | Mod: 25,S$PBB,, | Performed by: INTERNAL MEDICINE

## 2024-11-25 PROCEDURE — 93010 ELECTROCARDIOGRAM REPORT: CPT | Mod: S$PBB,,, | Performed by: INTERNAL MEDICINE

## 2024-11-25 PROCEDURE — 99999 PR PBB SHADOW E&M-EST. PATIENT-LVL IV: CPT | Mod: PBBFAC,,, | Performed by: INTERNAL MEDICINE

## 2024-11-25 RX ORDER — DILTIAZEM HYDROCHLORIDE 240 MG/1
240 CAPSULE, COATED, EXTENDED RELEASE ORAL DAILY
Qty: 90 CAPSULE | Refills: 3 | Status: SHIPPED | OUTPATIENT
Start: 2024-11-25

## 2024-11-25 SDOH — SOCIAL DETERMINANTS OF HEALTH (SDOH): OTHER PHYSICAL AND MENTAL STRAIN RELATED TO WORK: Z56.6

## 2024-11-25 NOTE — PATIENT INSTRUCTIONS
Recommended Mediterranean dietEating Heart-Healthy Food: Using the DASH Plan  Eating for your heart doesnt have to be hard or boring. You just need to know how to make healthier choices. The DASH eating plan has been developed to help you do just that. DASH stands for Dietary Approaches to Stop Hypertension. It is a plan that has been proven to be healthier for your heart and to lower your risk for high blood pressure. It can also help lower your risk for cancer, heart disease, osteoporosis, and diabetes.  Choosing from Each Food Group  Choose foods from each of the food groups below each day. Try to get the recommended number of servings for each food group. The serving numbers are based on a diet of 2,000 calories a day. Talk to your doctor if youre unsure about your calorie needs.  Grains   Servings: 7-8 a day  A serving is:  1 slice bread  1 ounce dry cereal  half a cup cooked rice or pasta  Best choices: Whole grains and any grains high in fiber.  Vegetables   Servings: 4-5 a day  A serving is:  1 cup raw leafy vegetable  Half a cup cooked vegetable  Three-quarter cup vegetable juice  Best choices: Fresh or frozen vegetable prepared without too much added salt or fat.    Fruits   Servings: 4-5 a day  A serving is:  Three-quarter cup fruit juice  1 medium fruit  One-quarter cup dried fruit  One-half cup fresh, frozen, or canned fruit  Best choices: A variety of fresh fruits of different colors. Whole fruits are a much better choice than fruit juices.  Low-fat or Fat Free Dairy   Servings: 2-3 a day  A serving is:  8 ounces milk  1 cup yogurt  One and a half ounces cheese  Best choices: Skim or 1% milk, low-fat or fat free yogurt or buttermilk, and low-fat cheeses.       Meat, Poultry, Fish   Servings: 2 or fewer a day  A serving is:  3 ounces cooked meat, poultry, or fish  Best choices: Lean meats and fish. Trim away visible fat. Broil, roast, or boil instead of frying. Remove skin from poultry before eating.   Nuts, Seeds, Beans   Servings: 4-5 a week  A serving is:  One third cup nuts (or one and a half ounces)  2 tablespoons sunflower seeds  Half a cup cooked beans  Best choices: Dry roasted nuts with no salt added, lentils, kidney beans, garbanzo beans, and whole jackson beans.    Fats and Oils   Servings: 2 a day  A serving is:  1 teaspoon vegetable oil  1 teaspoon soft margarine  1 tablespoon low-fat mayonnaise  1 teaspoon regular mayonnaise  2 tablespoons light salad dressing  1 tablespoon regular salad dressing  Best choices: Monounsaturated and polyunsaturated fats such as olive, canola, or safflower oil.  Sweets   Servings: 5 a week or fewer  A serving is:  1 tablespoon sugar, maple syrup, or honey  1 tablespoon jam or jelly  1 half-ounce jelly beans (about 15)  8 ounces lemonade  Best choices: Dried fruit can be a satisfying sweet. Choose low-fat sweets when possible. And watch your serving sizes!       Aerobic Exercise for a Healthy Heart  Exercise is a lot more than an energy booster and a stress reliever. It also strengthens your heart muscle, lowers your blood pressure and blood cholesterol, and burns calories.      Remember, some activity is better than none.     Choose an Aerobic Activity  Choose a nonstop activity that makes your heart and lungs work harder than they do when you rest or walk normally. This aerobic exercise can improve the way your heart and other muscles use oxygen. Make it fun by exercising with a friend and choosing an activity you enjoy. Here are some ideas:  Walking  Swimming  Bicycling  Stair climbing  Dancing  Jogging  Exercise Regularly  If you havent been exercising regularly,  get your doctors okay first. Then start slowly.  Here are some tips:  Begin exercising 3 times a week for 5-10 minutes at a time.  When you feel comfortable, add a few minutes each week.  Slowly build up to exercising 3-4 times each week for 20-40 minutes. Aim for a total of 150 or more minutes a  week.  Be sure to carry your nitroglycerin with you when you exercise.  If you get angina when youre exercising, stop what youre doing, take your nitroglycerin, and call your doctor.  © 9770-1863 [employer], [address] [place] [address], [place] [address]. All rights reserved. This information is not intended as a substitute for professional medical care. Always follow your healthcare professional's instructions.    Losing Weight (Cardiovascular)  Excess weight is a major risk factor for heart disease. Losing weight may help keep your arteries open so that your heart can get the oxygen-rich blood it needs. Weight loss can also help lower your blood pressure and reduce your risk for diabetes. All in all, losing weight makes you healthier.          Exercise with a friend. When activity is fun, you're more likely to stick with it.        Calories and Weight Loss  Calories are the fuel your body burns for energy. You get the calories you need from the food you eat. For healthy weight loss, women should eat at least 1,200 calories a day, men at least 1,500.    When you eat more calories than you need, your body stores the extra calories as fat. One pound of fat equals 3,500 calories.    To lose weight, try to burn 500 calories a day more than you eat. To do this, eat 250 calories less each day. Add activity to burn the other 250 calories. Walking 21/2 miles burns about 250 calories.    Eat a variety of healthy foods. Its the best way to make calories count.     Tips for losing weight:  Drink 8 to 10 glasses of water a day.    Dont skip meals. Instead, eat smaller portions.       Brisk Activity Is Best  Brisk activity gets your heart pumping faster. It makes your heart healthier. Its also the best way to burn calories. In fact, your body may keep burning calories for hours after you stop a brisk activity.    Begin by walking 10 minutes most days.    Add more time and speed to your walk. Build up as you feel  able.    Try to walk briskly at least 30 minutes most days. If needed, you can break this into 2 shorter sessions.     Check off the ideas below that you could try to make your day more active:    Take the stairs instead of the elevator.    Park your car farther away and walk.    Ride a bike to work or to the store.    Walk laps around the mall.

## 2024-11-25 NOTE — LETTER
November 25, 2024      COLBY Beal  [address]  [address]  [place] [address]           [facility] - Cardiology  [address]  [address]  [facility] [place] [address]  Phone: [phone]  Fax: [phone]          Patient: Luisa Jesus   MR Number: [id number]   YOB: 1985   Date of Visit: 11/25/2024       Dear Aaareferral Self:    Thank you for referring Luisa Jesus to me for evaluation. Attached you will find relevant portions of my assessment and plan of care.    If you have questions, please do not hesitate to call me. I look forward to following Luisa Jesus along with you.    Sincerely,    Danilo Hernandez MD    Enclosure  CC:  No Recipients    If you would like to receive this communication electronically, please contact [email or web address] or [phone] to request more information on [employer] Link access.    For providers and/or their staff who would like to refer a patient to [facility], please contact us through our one-stop-shop provider referral line, [facility], at [phone].    If you feel you have received this communication in error or would no longer like to receive these types of communications, please e-mail [email or web address]      Instructions: This plan will send the code FBSE to the PM system.  DO NOT or CHANGE the price. Price (Do Not Change): 0.00 Detail Level: Simple

## 2024-11-25 NOTE — PROGRESS NOTES
Subjective:        Patient ID:  Luisa Jesus is a 39 y.o. female who presents for evaluation of Establish Care and Heart Problem  Came on own for recurrent PSVT with syncope.  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     Health literacy: high  Vaccinations: declined vaccinations, COVID infection 11/2022, returns of POTS  Activities:  very, exercise 5 days weekly for  minutes, limited by palpitations, MODI.  Nicotine: non-smoker  Alcohol: none  Illicit drugs: No  Cardiac symptoms:  palpitations with syncope  Home BP: No  Medication compliance: Yes, on diltiazem 120 mg daily  Diet: Regular  Caffeine: How much do you consume a day? 2 cpd, no difference in tachycardia.  Labs:   Sodium   Date Value Ref Range Status   08/04/2014 140 136 - 145 mmol/L Final     Potassium   Date Value Ref Range Status   08/04/2014 4.0 3.5 - 5.1 mmol/L Final     Chloride   Date Value Ref Range Status   08/04/2014 108 95 - 110 mmol/L Final     CO2   Date Value Ref Range Status   08/04/2014 20 (L) 23 - 29 mmol/L Final     Glucose   Date Value Ref Range Status   08/04/2014 88 70 - 110 mg/dL Final     BUN   Date Value Ref Range Status   08/04/2014 10 6 - 20 mg/dL Final     Creatinine   Date Value Ref Range Status   08/04/2014 0.7 0.5 - 1.4 mg/dL Final     Calcium   Date Value Ref Range Status   08/04/2014 9.0 8.7 - 10.5 mg/dL Final     Total Protein   Date Value Ref Range Status   08/04/2014 6.9 6.0 - 8.4 g/dL Final     Albumin   Date Value Ref Range Status   08/04/2014 4.1 3.5 - 5.2 g/dL Final     Total Bilirubin   Date Value Ref Range Status   08/04/2014 0.6 0.1 - 1.0 mg/dL Final     Comment:     For infants and newborns, interpretation of  results should be based  on gestational age, weight and in agreement with clinical  observations.  Premature Infant recommended reference ranges:  Up to 24 hours.............<8.0 mg/dL  Up to 48 hours............<12.0 mg/dL  3-5 days..................<15.0 mg/dL  6-29 days.................<15.0 mg/dL       Alkaline Phosphatase   Date Value Ref Range Status   08/04/2014 62 55 - 135 U/L Final     AST   Date Value Ref Range Status   08/04/2014 18 10 - 40 U/L Final     ALT   Date Value Ref Range Status   08/04/2014 11 10 - 44 U/L Final     Anion Gap   Date Value Ref Range Status   08/04/2014 12 8 - 16 mmol/L Final      Lab Results   Component Value Date    WBC 4.82 08/04/2014    RBC 4.39 08/04/2014    HGB 12.0 08/04/2014    HCT 39.8 08/04/2014    MCV 91 08/04/2014    MCH 27.3 08/04/2014    MCHC 30.2 (L) 08/04/2014    RDW 14.5 08/04/2014     08/04/2014    MPV 11.2 08/04/2014    GRAN 2.6 08/04/2014    GRAN 54.4 08/04/2014    LYMPH 1.8 08/04/2014    LYMPH 36.7 08/04/2014    MONO 0.3 08/04/2014    MONO 6.4 08/04/2014    EOS 0.1 08/04/2014    BASO 0.02 08/04/2014    EOSINOPHIL 2.1 08/04/2014    BASOPHIL 0.4 08/04/2014      Lab Results   Component Value Date    TSH 1.325 08/04/2014     Lab Results   Component Value Date    HGBA1C 5.2 09/22/2014      Lab Results   Component Value Date    CHOL 168 09/22/2014     Lab Results   Component Value Date    HDL 71 09/22/2014     Lab Results   Component Value Date    LDLCALC 73.0 09/22/2014     Lab Results   Component Value Date    TRIG 120 09/22/2014       Lab Results   Component Value Date    CHOLHDL 42.3 09/22/2014        Last Echo:   No results found for this or any previous visit.    Last stress test:   No results found for this or any previous visit.     Cardiovascular angiogram:  none    ECG: ST, rate 103, biatrial enlargement.    Fundoscopic exam: annually, no retinopathy    WF self referred for recurrent tachycardia with syncope since 2014. First dx of POTS with syncope  in 2014 but also noted to have PSVT and underwent ablation in 2015. Successful but still required BB for control and no syncope. Problem recurred after unvaccinated COVID in 2022 and changed to CCB. Helpful for about a year. New autoimmune / collagen vascular disease, considering lupus and restarted the syncope over the past 2 month. 6 episodes of syncope associated tachycardia with rates of 130 to 170 bpm on the wearable. Associated with nausea and sweating and post-ictal fatigue for the rest of the day. Tachycardia for at least half the day. No recent injuries. Adopted, great-grandmother had rapid heart rate.         Review of Systems   Constitutional: Positive for chills, decreased appetite, diaphoresis, fever, malaise/fatigue and weight gain. Negative for weight loss.   HENT:  Positive for hearing loss, hoarse voice and sore throat. Negative for congestion.    Eyes:  Positive for double vision and pain.   Cardiovascular:  Positive for chest pain, dyspnea on exertion, leg swelling and palpitations. Negative for paroxysmal nocturnal dyspnea.   Respiratory:  Positive for cough and shortness of breath. Negative for sleep disturbances due to breathing, snoring, sputum production and wheezing.         [place] score 3, awaken tired.   Endocrine: Positive for cold intolerance, heat intolerance and polyuria. Negative for polydipsia.   Hematologic/Lymphatic: Negative for bleeding problem. Bruises/bleeds easily.   Musculoskeletal:  Positive for neck pain. Negative for falls.   Gastrointestinal:  Positive for dysphagia and nausea. Negative for change in bowel habit, constipation, diarrhea, heartburn, hematochezia, jaundice and melena.   Genitourinary:  Positive for frequency and hematuria. Negative for bladder incontinence, dysuria and hesitancy.   Neurological:  Positive for dizziness, headaches, light-headedness, numbness, paresthesias and weakness. Negative for seizures.   Psychiatric/Behavioral:  Positive for memory  "loss. Negative for depression. The patient is not nervous/anxious.         Answers submitted by the patient for this visit:  Review of Symptoms (Submitted on 11/25/2024)  Sweats?: Yes  chest tightness: Yes  Difficulty breathing when lying down?: No  syncope: Yes    Objective:    Physical Exam  Constitutional:       Appearance: She is well-developed.      Comments: RA O2 sat 94%  Orthostatic VS: sitting 134/75, standing 134/86   HENT:      Head: Normocephalic.   Eyes:      Conjunctiva/sclera: Conjunctivae normal.      Pupils: Pupils are equal, round, and reactive to light.   Neck:      Thyroid: No thyromegaly.      Vascular: No JVD.   Cardiovascular:      Rate and Rhythm: Regular rhythm. Tachycardia present.      Pulses: Intact distal pulses.           Carotid pulses are 1+ on the right side and 1+ on the left side.       Radial pulses are 1+ on the right side and 1+ on the left side.        Dorsalis pedis pulses are 1+ on the right side and 1+ on the left side.        Posterior tibial pulses are 1+ on the right side and 1+ on the left side.      Heart sounds: Normal heart sounds. No murmur heard.     No friction rub. No gallop.   Pulmonary:      Effort: Pulmonary effort is normal.      Breath sounds: Normal breath sounds. No rales.   Chest:      Chest wall: No tenderness.   Abdominal:      General: Bowel sounds are normal.      Palpations: Abdomen is soft.      Tenderness: There is no abdominal tenderness.      Comments: Waist 33.5"   Musculoskeletal:         General: Normal range of motion.      Cervical back: Normal range of motion and neck supple.   Lymphadenopathy:      Cervical: No cervical adenopathy.   Skin:     General: Skin is warm and dry.      Findings: No rash.   Neurological:      Mental Status: She is alert and oriented to person, place, and time.           Assessment:       1. POTS (postural orthostatic tachycardia syndrome)    2. History of syncope    3. Palpitations    4. Paroxysmal SVT " "(supraventricular tachycardia)    5. Hx of prior ablation treatment, for PSVT 2015    6. Stress at work    7. MODI (dyspnea on exertion)         Plan:       Luisa Hannah" was seen today for establish care and heart problem.    Diagnoses and all orders for this visit:    POTS (postural orthostatic tachycardia syndrome)  -     IN OFFICE EKG 12-LEAD (to Muse)  -     diltiaZEM (CARDIZEM CD) 240 MG 24 hr capsule; Take 1 capsule (240 mg total) by mouth once daily.  -     Cardiac Monitor - 3-15 Day Adult (Cupid Only); Future  -     Ambulatory referral/consult to Cardiac Electrophysiology; Future    History of syncope  -     IN OFFICE EKG 12-LEAD (to Muse)  -     Echo; Future  -     Cardiac Monitor - 3-15 Day Adult (Cupid Only); Future  -     Ambulatory referral/consult to Cardiac Electrophysiology; Future    Palpitations  -     Echo; Future    Paroxysmal SVT (supraventricular tachycardia)  -     diltiaZEM (CARDIZEM CD) 240 MG 24 hr capsule; Take 1 capsule (240 mg total) by mouth once daily.  -     Cardiac Monitor - 3-15 Day Adult (Cupid Only); Future  -     Ambulatory referral/consult to Cardiac Electrophysiology; Future    Hx of prior ablation treatment, for PSVT 2015    Stress at work    MODI (dyspnea on exertion)  -     Echo; Future     - All medical issues reviewed, will uptitrate CCB  - Consider use of Potassium chloride salt substitute, Gross Nu-Salt.   - CV status and all medications reviewed, patient acknowledge good understanding.  - Recommend healthy living: healthy diet and regular exercise aiming for fitness, restorative sleep and weight control  - Need good exercise program, 4 key elements: 1. Aerobic (walking, swimming, dancing, jogging, biking, etc, 2. Muscle strengthening / resistance exercise, need to do 2-3 times weekly, 3. Stretching daily, good stretch takes a whole  total minute. 4. Balance exercise daily.   - Instruction for Mediterranean, high potassium diet and heart healthy exercise " given.  - Weigh twice weekly, try to lose 1-2 lbs per week. Target weight loss of 5%-10%.  - Highly recommend 30-60 minutes of exercise / activities daily, can have Sunday off, with 2-3 sessions of muscle strengthening weekly. A  would be very helpful.  - Low ASCVD risk, follow up only as needed. Need EP evaluation and follow up  - Phone review / encourage use of [facility]       Total time spend including review of record prior to face-to-face visit is 60 minutes. Greater than 50% of the time was spent in counseling and coordination of care. The above assessment and plan have been discussed at length. Referring provider's note reviewed. Labs and procedure over the last 6 months reviewed. Problem List updated. Asked to bring in all active medications / pills bottles with next visit. Will send note to referring / PCP.

## 2024-11-26 ENCOUNTER — TELEPHONE (OUTPATIENT)
Dept: UROGYNECOLOGY | Facility: CLINIC | Age: 39
End: 2024-11-26
Payer: OTHER GOVERNMENT

## 2024-11-26 NOTE — TELEPHONE ENCOUNTER
Please let the patient know that are resolve MDX report came back showing no growth.  She does not have a UTI.

## 2024-12-02 ENCOUNTER — OFFICE VISIT (OUTPATIENT)
Dept: PAIN MEDICINE | Facility: CLINIC | Age: 39
End: 2024-12-02
Payer: OTHER GOVERNMENT

## 2024-12-02 VITALS
HEART RATE: 100 BPM | BODY MASS INDEX: 31.21 KG/M2 | WEIGHT: 176.13 LBS | SYSTOLIC BLOOD PRESSURE: 125 MMHG | HEIGHT: 63 IN | DIASTOLIC BLOOD PRESSURE: 82 MMHG

## 2024-12-02 DIAGNOSIS — M54.81 BILATERAL OCCIPITAL NEURALGIA: ICD-10-CM

## 2024-12-02 DIAGNOSIS — M54.16 LUMBAR RADICULOPATHY: Primary | ICD-10-CM

## 2024-12-02 DIAGNOSIS — M47.812 CERVICAL SPONDYLOSIS: ICD-10-CM

## 2024-12-02 DIAGNOSIS — M79.18 MYOFASCIAL PAIN: ICD-10-CM

## 2024-12-02 DIAGNOSIS — G90.A POTS (POSTURAL ORTHOSTATIC TACHYCARDIA SYNDROME): ICD-10-CM

## 2024-12-02 PROBLEM — N60.19 FIBROCYSTIC BREAST CHANGES: Status: ACTIVE | Noted: 2024-12-02

## 2024-12-02 PROBLEM — M35.9 UNDIFFERENTIATED CONNECTIVE TISSUE DISEASE: Status: ACTIVE | Noted: 2024-12-02

## 2024-12-02 PROBLEM — R74.8 ELEVATED CPK: Status: ACTIVE | Noted: 2024-12-02

## 2024-12-02 PROBLEM — Z79.899 HIGH RISK MEDICATION USE: Status: ACTIVE | Noted: 2024-12-02

## 2024-12-02 PROBLEM — I83.893 VARICOSE VEINS OF BILATERAL LOWER EXTREMITIES WITH OTHER COMPLICATIONS: Status: ACTIVE | Noted: 2024-12-02

## 2024-12-02 PROBLEM — Z82.49 FAMILY HISTORY OF PREMATURE CORONARY HEART DISEASE: Status: ACTIVE | Noted: 2024-12-02

## 2024-12-02 PROBLEM — S92.023K: Status: ACTIVE | Noted: 2024-12-02

## 2024-12-02 PROBLEM — R42 CHRONIC VERTIGO: Status: ACTIVE | Noted: 2024-12-02

## 2024-12-02 PROBLEM — K92.1 HEMATOCHEZIA: Status: ACTIVE | Noted: 2024-12-02

## 2024-12-02 PROBLEM — Z91.81 HISTORY OF FALLING: Status: ACTIVE | Noted: 2024-12-02

## 2024-12-02 PROCEDURE — 99204 OFFICE O/P NEW MOD 45 MIN: CPT | Mod: S$PBB,,, | Performed by: STUDENT IN AN ORGANIZED HEALTH CARE EDUCATION/TRAINING PROGRAM

## 2024-12-02 PROCEDURE — 99999 PR PBB SHADOW E&M-EST. PATIENT-LVL III: CPT | Mod: PBBFAC,,, | Performed by: STUDENT IN AN ORGANIZED HEALTH CARE EDUCATION/TRAINING PROGRAM

## 2024-12-02 PROCEDURE — 99213 OFFICE O/P EST LOW 20 MIN: CPT | Mod: PBBFAC,PN | Performed by: STUDENT IN AN ORGANIZED HEALTH CARE EDUCATION/TRAINING PROGRAM

## 2024-12-02 RX ORDER — ONDANSETRON HYDROCHLORIDE 4 MG/1
4 TABLET, FILM COATED ORAL
COMMUNITY
Start: 2024-11-30 | End: 2024-12-04

## 2024-12-02 RX ORDER — BETAMETHASONE DIPROPIONATE 0.5 MG/G
OINTMENT TOPICAL
COMMUNITY
Start: 2024-10-21

## 2024-12-02 RX ORDER — PREDNISONE 20 MG/1
20 TABLET ORAL
COMMUNITY
Start: 2024-11-30 | End: 2024-12-05

## 2024-12-02 RX ORDER — FLUCONAZOLE 150 MG/1
150 TABLET ORAL ONCE
COMMUNITY
Start: 2024-11-30

## 2024-12-02 RX ORDER — AMOXICILLIN AND CLAVULANATE POTASSIUM 875; 125 MG/1; MG/1
TABLET, FILM COATED ORAL
COMMUNITY
Start: 2024-11-30 | End: 2024-12-10

## 2024-12-02 RX ORDER — GUAIFENESIN AND PHENYLEPHRINE HCL 400; 10 MG/1; MG/1
TABLET ORAL
COMMUNITY
Start: 2024-09-01

## 2024-12-02 RX ORDER — METHOCARBAMOL 500 MG/1
500 TABLET, FILM COATED ORAL 4 TIMES DAILY
Qty: 40 TABLET | Refills: 0 | Status: SHIPPED | OUTPATIENT
Start: 2024-12-02 | End: 2024-12-12

## 2024-12-02 RX ORDER — OMEPRAZOLE 40 MG/1
CAPSULE, DELAYED RELEASE ORAL
COMMUNITY
Start: 2024-01-04

## 2024-12-02 RX ORDER — FLUTICASONE PROPIONATE 50 MCG
SPRAY, SUSPENSION (ML) NASAL
COMMUNITY
Start: 2024-01-04

## 2024-12-02 NOTE — PROGRESS NOTES
[facility]    Referred by:   Alona Pierre,*    PCP:   No, Primary Doctor    CHIEF COMPLAINT:   Upper neck pain    HISTORY OF PRESENT ILLNESS:   Luisa Jesus presents for evaluation of chronic upper neck pain.  She reports a history of atraumatic aching pain which begins in the base of her neck and radiates into the lower occipital region.  She associates this with intermittent headaches affecting the same region as well as occasionally the frontal region.  She denies radiation into the arms as well as other neurologic symptoms such as paresthesias weakness in the cervical distribution.  She has not had any particular treatment for this complaint, although she does have a history of C5-6 ACDF sometime around 2008 for a disc.    I have seen this patient once before for a direct procedure a couple of weeks ago.  She underwent an L4-5 interlaminar JANETH.  She reports that this resolves her leg symptoms for about a week for they returned.  It did not seem to have much effect on her back pain itself.    Patient does have a history of unclassified connective tissue/rheumatologic disorder, dysautonomia with POTS, and the other medical comorbidities listed below.     PAIN SCORES:  Vitals:    12/02/24 1339   PainSc:   5   PainLoc: Neck       Therapy:  About to start therapy for the lower back   Not in therapy for the neck    Pertinent Medications:  Tried gabapentin in the past which did not help and also made her feel uneasy   Cymbalta 60 daily, initially seemed to help but not sure how much help it is providing at this point  Aspirin 81  Currently on prednisone and Augmentin for acute facial swelling possibly due to underlying cellulitis  All other medications reviewed in the patient's chart.     Pain Intervention History:  L4-5 interlaminar JANETH-1 week relief of leg symptoms, did not help back symptoms    Review of patient's allergies indicates:   Allergen Reactions    Diclofenac sodium  Other (See Comments)     Other reaction(s): Bloody stool    Ortho tri-cyclen (21) Hives    Sulfa (sulfonamide antibiotics) Hives and Rash    Scopolamine     Sulfur Hives    Bactrim [sulfamethoxazole-trimethoprim] Rash     Past Medical History:   Diagnosis Date    Allergy     Atrial fibrillation 10/01/2023    aproxx    IBS (irritable bowel syndrome)     SVT (supraventricular tachycardia)     Thrush 01/22/2015    recurrent     Patient Active Problem List   Diagnosis    Paroxysmal SVT (supraventricular tachycardia)    Hx of prior ablation treatment, for PSVT 2015    IBS (irritable bowel syndrome)    Imbalance    Chronic thoracic spine pain    History of syncope, onset 2011    Fatigue    Thrush    POTS (postural orthostatic tachycardia syndrome)    ALEXIS positive    Bilateral wrist pain    Cardiac conduction disorder    Diarrhea    Dysautonomia    GERD (gastroesophageal reflux disease)    Increased frequency of urination    Low serum vitamin B12    Palpitations    Stress at work    MODI (dyspnea on exertion)    Chronic vertigo    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    Elevated CPK    Family history of premature coronary heart disease    Fibrocystic breast changes    Hematochezia    High risk medication use    History of falling    Undifferentiated connective tissue disease    Varicose veins of bilateral lower extremities with other complications     Past Surgical History:   Procedure Laterality Date    CHOLECYSTECTOMY      Dx Laparoscopy      presumed endometriosis    EPIDURAL STEROID INJECTION INTO LUMBAR SPINE N/A 11/15/2024    Procedure: Injection-steroid-epidural-lumbar;  Surgeon: Konstantin Linder MD;  Location: [facility];  Service: Pain Management;  Laterality: N/A;    SINUS SURGERY  01/01/2007    SPINE SURGERY      C spine    TONSILLECTOMY      VENTRICULAR ABLATION SURGERY      WISDOM TOOTH EXTRACTION       Social History     Tobacco Use    Smoking status: Never     "Smokeless tobacco: Never   Substance Use Topics    Alcohol use: Yes     Comment: rarely    Drug use: No        Family history reviewed in the patient's chart.     PHYSICAL EXAMINATION  VITALS:   Vitals:    12/02/24 1339   BP: 125/82   Pulse: 100   Weight: 79.9 kg (176 lb 2.4 oz)   Height: 5' 3" (1.6 m)   PainSc:   5   PainLoc: Neck     GENERAL: Calm, cooperative, pleasant  CHEST: No increased work of breathing  SKIN: Intact    MSK/NEURO:  Cervical spine range of motion is full with associated upper cervical pain on terminal extension and crepitus on lateral bending   Tender to palpation in the upper cervical paraspinals and lower occipital region  Strength is full throughout bilateral lower extremities   Sensation is intact to light touch throughout bilateral upper extremities   Reflexes are intact and symmetric in the upper and lower extremities  No Rocha's no clonus    LABS:  No recent, relevant labs    IMAGING:    Cervical spine MRI with and without contrast 2024   Personal review open (no report available):  Normal alignment of the spine.  There appears to be anterior fixation hardware between C5-6.  There is significant degenerative disc disease at C4-5 without herniation.  There is degenerative disc disease associated with a broad posterior disc protrusion below the fusion construct at C6-7.  This narrows the central canal but does not cause significant stenosis.  No apparent cord signal change.  Mild multilevel cervical spondylosis more prominent in the lower levels.    ASSESSMENT:   39 y.o. year old female with suspected symptomatic spondylosis of the upper cervical spine causing occipital neuralgia.  Also with lower back pain and radicular features which did not respond to interlaminar steroid injection.    Encounter Diagnoses   Name Primary?    Lumbar radiculopathy Yes    Myofascial pain     Cervical spondylosis     Bilateral occipital neuralgia     POTS (postural orthostatic tachycardia syndrome)  "       PLAN:  Referral to physical therapy.  Optimistic that this will help given the very mild degenerative changes seen on C-spine imaging.  We will ask them to combine this with her current therapy order for her lower back.  She has the availability to treat both.  Trial of Robaxin 500 mg nightly p.r.n. for when she is having difficulty sleeping.  Can try different relaxer if this 1 does not seem to help much.  Would avoid tizanidine given her history of POTS  Should she fail conservative measures, we will be looking at TON, C2, C3 blocks  Return in 4-6 weeks to assess progress      Konstantin Linder MD  This note was completed with dictation software and grammatical/syntax errors may exist.

## 2024-12-03 ENCOUNTER — HOSPITAL ENCOUNTER (OUTPATIENT)
Dept: CARDIOLOGY | Facility: HOSPITAL | Age: 39
Discharge: HOME | End: 2024-12-03
Attending: INTERNAL MEDICINE
Payer: OTHER GOVERNMENT

## 2024-12-03 VITALS — HEIGHT: 63 IN | BODY MASS INDEX: 31.18 KG/M2 | WEIGHT: 176 LBS

## 2024-12-03 DIAGNOSIS — G90.A POTS (POSTURAL ORTHOSTATIC TACHYCARDIA SYNDROME): ICD-10-CM

## 2024-12-03 DIAGNOSIS — R00.2 PALPITATIONS: ICD-10-CM

## 2024-12-03 DIAGNOSIS — I47.10 PAROXYSMAL SVT (SUPRAVENTRICULAR TACHYCARDIA): ICD-10-CM

## 2024-12-03 DIAGNOSIS — R06.09 DOE (DYSPNEA ON EXERTION): ICD-10-CM

## 2024-12-03 DIAGNOSIS — Z87.898 HISTORY OF SYNCOPE: ICD-10-CM

## 2024-12-03 PROCEDURE — 93306 TTE W/DOPPLER COMPLETE: CPT

## 2024-12-03 PROCEDURE — 93246 EXT ECG>7D<15D RECORDING: CPT

## 2024-12-04 ENCOUNTER — TELEPHONE (OUTPATIENT)
Dept: UROGYNECOLOGY | Facility: CLINIC | Age: 39
End: 2024-12-04

## 2024-12-04 ENCOUNTER — OFFICE VISIT (OUTPATIENT)
Dept: UROGYNECOLOGY | Facility: CLINIC | Age: 39
End: 2024-12-04
Payer: OTHER GOVERNMENT

## 2024-12-04 VITALS — BODY MASS INDEX: 30.86 KG/M2 | HEIGHT: 63 IN | WEIGHT: 174.19 LBS

## 2024-12-04 DIAGNOSIS — N39.41 URGE INCONTINENCE OF URINE: ICD-10-CM

## 2024-12-04 DIAGNOSIS — N30.90 CYSTITIS: ICD-10-CM

## 2024-12-04 DIAGNOSIS — R35.0 URINARY FREQUENCY: Primary | ICD-10-CM

## 2024-12-04 LAB
ASCENDING AORTA: 2.7 CM
AV INDEX (PROSTH): 13.46
AV MEAN GRADIENT: 10.7 MMHG
AV PEAK GRADIENT: 19.4 MMHG
AV VALVE AREA BY VELOCITY RATIO: 2.3 CM²
AV VALVE AREA: 42.3 CM²
AV VELOCITY RATIO: 0.73
BILIRUBIN, UA POC OHS: NEGATIVE
BLOOD, UA POC OHS: NEGATIVE
BSA FOR ECHO PROCEDURE: 1.88 M2
CLARITY, UA POC OHS: CLEAR
COLOR, UA POC OHS: YELLOW
CV ECHO LV RWT: 0.29 CM
DOP CALC AO PEAK VEL: 2.2 M/S
DOP CALC AO VTI: 2.4 CM
DOP CALC LVOT AREA: 3.1 CM2
DOP CALC LVOT DIAMETER: 2 CM
DOP CALC LVOT PEAK VEL: 1.6 M/S
DOP CALC LVOT STROKE VOLUME: 101.4 CM3
DOP CALCLVOT PEAK VEL VTI: 32.3 CM
E/A RATIO: 1.12
E/E' RATIO: 6.07 M/S
ECHO LV POSTERIOR WALL: 0.7 CM (ref 0.6–1.1)
EJECTION FRACTION: 70 %
FRACTIONAL SHORTENING: 39.6 % (ref 28–44)
GLOBAL LONGITUIDAL STRAIN: 21 %
GLUCOSE, UA POC OHS: NEGATIVE
INTERVENTRICULAR SEPTUM: 0.9 CM (ref 0.6–1.1)
KETONES, UA POC OHS: NEGATIVE
LA MAJOR: 4.7 CM
LA MINOR: 2.7 CM
LEFT ATRIUM SIZE: 3.4 CM
LEFT INTERNAL DIMENSION IN SYSTOLE: 2.9 CM (ref 2.1–4)
LEFT VENTRICLE MASS INDEX: 69.2 G/M2
LEFT VENTRICULAR INTERNAL DIMENSION IN DIASTOLE: 4.8 CM (ref 3.5–6)
LEFT VENTRICULAR MASS: 126.7 G
LEUKOCYTES, UA POC OHS: ABNORMAL
LV LATERAL E/E' RATIO: 4.82 M/S
LV SEPTAL E/E' RATIO: 8.2 M/S
Lab: 2
Lab: 6.1 MMHG
MITRAL VALVE MEAN VELOCITY: 0.7 M/S
MITRAL VALVE PEAK VELOCITY: 1 CM/S
MV MEAN GRADIENT: 2 MMHG
MV PEAK A VEL: 0.73 M/S
MV PEAK E VEL: 0.82 M/S
MV PEAK GRADIENT: 4 MMHG
NITRITE, UA POC OHS: NEGATIVE
PH, UA POC OHS: 7
PISA TR MAX VEL: 2.4 M/S
PROTEIN, UA POC OHS: NEGATIVE
RA MAJOR: 4.2 CM
RA PRESSURE ESTIMATED: 8 MMHG
RA WIDTH: 2.5 CM
RV TB RVSP: 10 MMHG
SINUS: 2.8 CM
SPECIFIC GRAVITY, UA POC OHS: 1.01
STJ: 2.6 CM
TDI LATERAL: 0.17 M/S
TDI SEPTAL: 0.1 M/S
TDI: 0.14 M/S
TR MAX PG: 23 MMHG
TRICUSPID ANNULAR PLANE SYSTOLIC EXCURSION: 2.4 CM
TV REST PULMONARY ARTERY PRESSURE: 31 MMHG
UROBILINOGEN, UA POC OHS: 0.2
Z-SCORE OF LEFT VENTRICULAR DIMENSION IN END DIASTOLE: -0.55
Z-SCORE OF LEFT VENTRICULAR DIMENSION IN END SYSTOLE: -0.61

## 2024-12-04 PROCEDURE — 99999 PR PBB SHADOW E&M-EST. PATIENT-LVL IV: CPT | Mod: PBBFAC,,, | Performed by: NURSE PRACTITIONER

## 2024-12-04 PROCEDURE — 51700 IRRIGATION OF BLADDER: CPT | Mod: PBBFAC,PO | Performed by: NURSE PRACTITIONER

## 2024-12-04 PROCEDURE — 99999PBSHW POCT URINALYSIS(INSTRUMENT): Mod: PBBFAC,,,

## 2024-12-04 PROCEDURE — 99999PBSHW PR PBB SHADOW TECHNICAL ONLY FILED TO HB: Mod: PBBFAC,,,

## 2024-12-04 PROCEDURE — 99214 OFFICE O/P EST MOD 30 MIN: CPT | Mod: S$PBB,25,, | Performed by: NURSE PRACTITIONER

## 2024-12-04 PROCEDURE — 99214 OFFICE O/P EST MOD 30 MIN: CPT | Mod: PBBFAC,PO,25 | Performed by: NURSE PRACTITIONER

## 2024-12-04 PROCEDURE — 81003 URINALYSIS AUTO W/O SCOPE: CPT | Mod: PBBFAC,PO | Performed by: NURSE PRACTITIONER

## 2024-12-04 PROCEDURE — 51700 IRRIGATION OF BLADDER: CPT | Mod: S$PBB,,, | Performed by: NURSE PRACTITIONER

## 2024-12-04 RX ORDER — LIDOCAINE HYDROCHLORIDE 20 MG/ML
20 INJECTION, SOLUTION INFILTRATION; PERINEURAL
Status: COMPLETED | OUTPATIENT
Start: 2024-12-04 | End: 2024-12-04

## 2024-12-04 RX ORDER — HEPARIN SODIUM 5000 [USP'U]/ML
20000 INJECTION, SOLUTION INTRAVENOUS; SUBCUTANEOUS ONCE
Status: COMPLETED | OUTPATIENT
Start: 2024-12-04 | End: 2024-12-04

## 2024-12-04 RX ORDER — GENTAMICIN 40 MG/ML
80 INJECTION, SOLUTION INTRAMUSCULAR; INTRAVENOUS ONCE
Status: COMPLETED | OUTPATIENT
Start: 2024-12-04 | End: 2024-12-04

## 2024-12-04 RX ADMIN — LIDOCAINE HYDROCHLORIDE 20 ML: 20 INJECTION, SOLUTION INFILTRATION; PERINEURAL at 02:12

## 2024-12-04 RX ADMIN — GENTAMICIN SULFATE 80 MG: 40 INJECTION, SOLUTION INTRAMUSCULAR; INTRAVENOUS at 02:12

## 2024-12-04 RX ADMIN — HEPARIN SODIUM 20000 UNITS: 5000 INJECTION, SOLUTION INTRAVENOUS; SUBCUTANEOUS at 02:12

## 2024-12-04 NOTE — TELEPHONE ENCOUNTER
Please call the patient tomorrow Thursday 12/05/2024 to see how she was doing after the instillation

## 2024-12-04 NOTE — PROGRESS NOTES
Subjective:       Patient ID: Luisa Jesus is a 39 y.o. female.    Chief Complaint: Urinary Frequency      Luisa Jesus is a 39 y.o. female.  Who presents today for instillation.  She was last seen in our office on 11/1924.  We did an anesthetic challenge at that visit.  The patient felt that after the anesthetic challenge her urgency had decreased significantly but she was still having frequency.  Her resolve urine culture came back showing no growth.  It was decided that trying a series of instillations could potentially be beneficial.  Patient is here to begin these instillations.  She denies any other acute complaints/concerns at this time and is ready to proceed with the instillation.    Review of Systems   Constitutional:  Negative for activity change, fever and unexpected weight change.   HENT:  Negative for hearing loss.    Eyes:  Negative for visual disturbance.   Respiratory:  Negative for shortness of breath and wheezing.    Cardiovascular:  Negative for chest pain, palpitations and leg swelling.   Gastrointestinal:  Negative for abdominal pain, constipation and diarrhea.   Genitourinary:  Positive for frequency and urgency. Negative for dyspareunia, dysuria, vaginal bleeding and vaginal discharge.   Musculoskeletal:  Negative for gait problem and neck pain.   Skin:  Negative for rash and wound.   Allergic/Immunologic: Negative for immunocompromised state.   Neurological:  Negative for tremors, speech difficulty and weakness.   Hematological:  Does not bruise/bleed easily.   Psychiatric/Behavioral:  Negative for agitation and confusion.        Objective:      Physical Exam  Vitals reviewed. Exam conducted with a chaperone present.   Constitutional:       General: She is not in acute distress.     Appearance: She is well-developed.   HENT:      Head: Normocephalic and atraumatic.   Neck:      Thyroid: No thyromegaly.   Pulmonary:      Effort: Pulmonary effort is normal. No respiratory  distress.   Abdominal:      Palpations: Abdomen is soft.      Tenderness: There is no abdominal tenderness.      Hernia: No hernia is present.   Musculoskeletal:         General: Normal range of motion.      Cervical back: Normal range of motion.   Skin:     General: Skin is warm and dry.      Findings: No rash.   Neurological:      Mental Status: She is alert and oriented to person, place, and time.   Psychiatric:         Mood and Affect: Mood normal.         Behavior: Behavior normal.         Thought Content: Thought content normal.       Pelvic Exam:  V: No lesions. No palpable nodes.   Meatus:No caruncle or stenosis  Urethra: Non tender. No suburethral masses.  BL: Non tender  RV: No hemorrhoids.      Assessment:       1. Urinary frequency    2. Urge incontinence of urine    3. Cystitis        Procedure note- After betadine irrigation of the urethra, lidocaine instilled via urojet.   A #14 Italian red rubber tip catheter was inserted into the bladder.  60 mls of residual urine noted.  80 mg of gentamicin, 01016 units of heparin, and 20 mls of 2% lidocaine instilled into bladder.  Pt instructed to hold mixture for at least 1 hour prior to voiding.  Pt verbalized understanding.       Plan:       Urinary frequency monitor  -     POCT Urinalysis(Instrument)    Urge incontinence of urine monitor    Cystitis instillation as noted above  -     LIDOcaine HCL 20 mg/ml (2%) injection 20 mL  -     heparin (porcine) injection 20,000 Units  -     gentamicin injection 80 mg        RTC one-week

## 2024-12-05 ENCOUNTER — CLINICAL SUPPORT (OUTPATIENT)
Dept: REHABILITATION | Facility: HOSPITAL | Age: 39
End: 2024-12-05
Payer: OTHER GOVERNMENT

## 2024-12-05 DIAGNOSIS — M47.812 CERVICAL SPONDYLOSIS: ICD-10-CM

## 2024-12-05 DIAGNOSIS — G89.29 CHRONIC BILATERAL LOW BACK PAIN WITHOUT SCIATICA: ICD-10-CM

## 2024-12-05 DIAGNOSIS — M54.2 NECK PAIN: Primary | ICD-10-CM

## 2024-12-05 DIAGNOSIS — M54.81 BILATERAL OCCIPITAL NEURALGIA: ICD-10-CM

## 2024-12-05 DIAGNOSIS — M54.50 CHRONIC BILATERAL LOW BACK PAIN WITHOUT SCIATICA: ICD-10-CM

## 2024-12-05 PROCEDURE — 97140 MANUAL THERAPY 1/> REGIONS: CPT

## 2024-12-05 PROCEDURE — 97162 PT EVAL MOD COMPLEX 30 MIN: CPT

## 2024-12-05 NOTE — TELEPHONE ENCOUNTER
Held in for two hours . Last night got up 4 times seems like it is alittle better , today she did notice that the pee is foamy.

## 2024-12-05 NOTE — PROGRESS NOTES
[facility]  Physical Therapy Initial Evaluation / Plan Of Care    Name: Luisa Jesus  Clinic Number: [id number]    Therapy Diagnosis:   Encounter Diagnoses   Name Primary?    Cervical spondylosis     Bilateral occipital neuralgia      Physician: Alona Pierre,*    Physician Orders: PT Eval and Treat   Medical Diagnosis:   M47.812 (ICD-10-CM) - Cervical spondylosis   M54.81 (ICD-10-CM) - Bilateral occipital neuralgia   M47.816 (ICD-10-CM) - Lumbar spondylosis   Evaluation Date: 12/5/2024  Authorization period Expiration: 12/31/2024  Plan of Care Expiration: 2/28/2025  Progress Note Due: 1/17/2025  Visit #/Visits authorized: 1/ 1   FOTO: 1/ 3       Precautions: Standard and chronic inflammation , wearing Holter monitor, occipital neuralgia   Date of Surgery: NA      Time In: 430 pm  Time Out: 515 pm  Total Appointment Time: 45 minutes  Total Billable Time: 45 minutes  SUBJECTIVE       Past Medical History:   Diagnosis Date    Allergy     Atrial fibrillation 10/01/2023    aproxx    IBS (irritable bowel syndrome)     SVT (supraventricular tachycardia)     Thrush 01/22/2015    recurrent     Luisa Jesus  has a past surgical history that includes Cholecystectomy; Tonsillectomy; Spine surgery; Sinus surgery (01/01/2007); [place] tooth extraction; Dx Laparoscopy; Ventricular ablation surgery; and Epidural steroid injection into lumbar spine (N/A, 11/15/2024).    Luisa has a current medication list which includes the following prescription(s): amoxicillin-clavulanate 875-125mg, aspirin, betamethasone dipropionate, bran/gum/fib/steven/psyl/kelp/pec, cholecalciferol (vitamin d3), diltiazem, duloxetine, flonase allergy relief, fluconazole, humira(cf) pen, magnesium oxide, methocarbamol, multivit with calcium,iron,min, omeprazole, prednisone, and turmeric root extract.    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  Prior Level of Function: independent, was working out until she contracted COVID 2 years ago  Current Level of Function: chronic fatigue, still independent but takes more time and effort to complete tasks    Pain:  Current 5/10, worst 8/10, best 4/10   Location: global, throughout the body but most pain in the suboccipital region into the shoulders; right hip and right knee, with paresthesia into the feet  Description: Aching  Aggravating Factors: no specific aggravating factors  Easing Factors: bath/soaking     Onset/JAMEE: following COVID    History of current condition - Laurie reports: 2 year history of symptoms, including global pain and achiness with recent exacerbation in neck and back. Back pain is bilateral but has more pain on the right side, including her right hip, knee and ankle. This does not seem to follow a neurological pathway, more joint pain. She reports neck pain to be worse than back at this time and would like to focus on the neck today. She states neck pain begins in suboccipital region and radiates down into the upper traps bilaterally. She states every morning she spends about an hour stretching then gets into the bath to relax her muscles before she can start her day. She has a history of tachycardia and is currently wearing a Holter monitor to track the arrhythmias. She has an autoimmune disorder, causing chronic inflammation and pain affecting her ability to perform her daily activities. She also reports daily headaches.    Pts goals: to improve pain.    OBJECTIVE     Cervical  Range of Motion:    Degrees Pain   Flexion 70    Extension 70    Left Rotation 60    Right Rotation 55    Left Side Bending 40    Right Side Bending 40       Shoulder Range of Motion:   Left:  WNL  Right: WNL    Strength:   Left Right   DNF 3+/5    Upper trap 5/5 5/5   Mid trap 4+/5 4+/5   Lower trap 4-/5 4-/5   Rhomboids 4/5 4/5    35# 40#     Upper Extremity Strength   Left Right   Shoulder flexion: 5/5 5/5   Shoulder Abduction: 5/5 5/5   Shoulder ER 5/5 5/5   Shoulder IR 5/5 5/5   Elbow flexion: 5/5 5/5   Elbow extension: 5/5 5/5   Wrist flexion: 5/5 5/5   Wrist extension: 5/5 5/5       Special Tests:  Compression Negative   Spurlings Negative   Sharp-Veda Negative   Lateral Flexion Alar Ligament Negative     Upper Limb Neurodynamic testing:   Left  Right   UNT Negative  Negative   MNT Negative  Negative   RNT Negative  Negative         Joint Mobility: cervical transverse glides restricted on right in upper cervical spine,      Palpation: moderate tenderness to palpation at bilateral suboccipitals, right cervical paravertebral muscles, and bilateral upper traps      Flexibility:     Upper Trap = R no restriction, L no restriction   Scalenes: R min restriction, L no restriction   SCM: R min restriction, L no restriction   Levator Scap: R no restriction, L no restriction      PT Evaluation Completed? Yes  Discussed Plan of Care with patient: Yes        Treatment     Treatment Time In: 4:55 pm  Treatment Time Out: 5:15 pm  Total Treatment time separate from Evaluation: 20 minutes    Laurie received the following manual therapy techniques for 20 minutes:   Grade II upper cervical side glides  PROM cervical spine all planes except extension  Gentle soft tissue mobilization to bilateral cervical paravertebral muscles, bilateral suboccipitals, and bilateral upper traps        Patient Education and Home Exercises     Home Exercises and Patient Education Provided    Education provided re:   - progress towards goals    - role of therapy in multi - disciplinary team, goals for therapy  Pt educated on condition, POC, and expectations in therapy.  No spiritual or educational barriers to learning provided    Home exercises:  Pt will be provided HEP during course of treatment with progressions as appropriate. Pt was advised to perform these exercises free of pain, and to stop performing them if pain occurs.   Laurie demonstrated good  understanding of the education provided.       ASSESSMENT      Luisa is a 39 y.o. female referred to outpatient physical therapy with a medical diagnosis of M47.812 (ICD-10-CM)- Cervical spondylosis, M54.81 (ICD-10-CM)- Bilateral occipital neuralgia, M47.816 (ICD-10-CM)- Lumbar spondylosis, and presents to PT with pain limiting function, weakness in deep neck flexors, lower traps, and  bilaterally and general malaise. Patient to benefit from skilled therapy to prevent further decline in functional status. Patient with good tolerance to initial treatment which focused on gentle mobility of the cervical spine and surrounding soft tissues. All questions and concerns addressed.  Patient demonstrates limitations as described in the problem list. Pt will benefit from physcial therapy services in order to maximize pain free mobility. The following goals were discussed with the patient and patient is in agreement with them as to be addressed in the treatment plan.     Pt prognosis is Fair.     Pt's spiritual, cultural and educational needs considered and pt agreeable to plan of care and goals as stated below:     Anticipated Barriers for therapy: co-morbidities    Plan of care discussed with patient: Yes    Pt will benefit from skilled outpatient Physical Therapy to address the deficits stated above and in the chart below, provide pt/family education, and to maximize pt's level of independence.     Medical necessity is demonstrated by the following IMPAIRMENTS/PROBLEM LIST:    weakness, impaired endurance,  impaired self care skills, impaired functional mobility, decreased upper extremity function, decreased lower extremity function, pain, impaired joint extensibility, and impaired muscle length      Medical Necessity is demonstrated by the following  History  Co-morbidities and personal factors that may impact the plan of care Co-morbidities/Personal Factors    0= low, 1-2 = mod, 3+ = high   high   Examination  Body Structures and Functions, activity limitations and participation restrictions that may impact the plan of care Body Regions/Body Systems    Participation Restrictions    Activity limitations  Mobility/Self care/Domestic Life/Community and Social Life    1-2 = low, 3+ = mod, 4+ = high       moderate   Clinical Presentation evolving clinical presentation with changing clinical characteristics  Stable/uncomplicated = low, evolving/changing = mod,  Unstable/unpredictable = high moderate   Decision Making/ Complexity Score: moderate           GOALS     Short Term Goals: 3 weeks  Demonstrate improvement in recent symptoms to progress toward long term goals  Correct postural deviations in sitting and standing to decrease pain and promote postural awareness for injury prevention.  Demonstrate compliance with initial exercise program    Long Term Goals: 6 weeks  Improve  strength by 5# bilaterally  Perform household and work tasks with minimal limitation  Report 50-75% decrease in frequency of headaches  Improve strength in mid back muscles and deep neck flexors by 1/2 MMT grade or better  Demonstrate independence with home exercise program to maintain gains made in therapy.      PLAN      Certification Period: 12/5/2024 to 2/28/2025.    Outpatient Physical Therapy 1-2 times weekly for 6 weeks to include the following interventions: patient education, Manual Therapy, Moist Heat/ Ice, Neuromuscular Re-ed, Patient Education, Self Care, Therapeutic Activities, Therapeutic Exercise, and Ultrasound.   Pt may be seen  by PTA as part of the rehabilitation team.     I certify the need for these services furnished under this plan of treatment and while under my care.    Ladonna Banda, PT        Attestation:   I have seen the patient, reviewed the therapist's plan of care, and I agree with the plan of care.   I certify the need for these services furnished under this plan of treatment and while under my care.         _______________            ________                                               _____________________  Physician/Referring Practitioner                                                            Date of Signature

## 2024-12-05 NOTE — PLAN OF CARE
[facility]  Physical Therapy Initial Evaluation / Plan Of Care    Name: Luisa Jesus  Clinic Number: [id number]    Therapy Diagnosis:   Encounter Diagnoses   Name Primary?    Cervical spondylosis     Bilateral occipital neuralgia      Physician: Alona Pierre,*    Physician Orders: PT Eval and Treat   Medical Diagnosis:   M47.812 (ICD-10-CM) - Cervical spondylosis   M54.81 (ICD-10-CM) - Bilateral occipital neuralgia   M47.816 (ICD-10-CM) - Lumbar spondylosis   Evaluation Date: 12/5/2024  Authorization period Expiration: 12/31/2024  Plan of Care Expiration: 2/28/2025  Progress Note Due: 1/17/2025  Visit #/Visits authorized: 1/ 1   FOTO: 1/ 3       Precautions: Standard and chronic inflammation, wearing Holter monitor, occipital neuralgia   Date of Surgery: NA      Time In: 430 pm  Time Out: 515 pm  Total Appointment Time: 45 minutes  Total Billable Time: 45 minutes  SUBJECTIVE       Past Medical History:   Diagnosis Date    Allergy     Atrial fibrillation 10/01/2023    aproxx    IBS (irritable bowel syndrome)     SVT (supraventricular tachycardia)     Thrush 01/22/2015    recurrent     Luisa Jesus  has a past surgical history that includes Cholecystectomy; Tonsillectomy; Spine surgery; Sinus surgery (01/01/2007); [place] tooth extraction; Dx Laparoscopy; Ventricular ablation surgery; and Epidural steroid injection into lumbar spine (N/A, 11/15/2024).    Luisa has a current medication list which includes the following prescription(s): amoxicillin-clavulanate 875-125mg, aspirin, betamethasone dipropionate, bran/gum/fib/steven/psyl/kelp/pec, cholecalciferol (vitamin d3), diltiazem, duloxetine, flonase allergy relief, fluconazole, humira(cf) pen, magnesium oxide, methocarbamol, multivit with calcium,iron,min, omeprazole, prednisone, and turmeric root extract.    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  Prior Level of Function: independent, was working out until she contracted COVID 2 years ago  Current Level of Function: chronic fatigue, still independent but takes more time and effort to complete tasks    Pain:  Current 5/10, worst 8/10, best 4/10   Location: global, throughout the body but most pain in the suboccipital region into the shoulders; right hip and right knee, with paresthesia into the feet  Description: Aching  Aggravating Factors: no specific aggravating factors  Easing Factors: bath/soaking     Onset/JAMEE: following COVID    History of current condition - Laurie reports: 2 year history of symptoms, including global pain and achiness with recent exacerbation in neck and back. Back pain is bilateral but has more pain on the right side, including her right hip, knee and ankle. This does not seem to follow a neurological pathway, more joint pain. She reports neck pain to be worse than back at this time and would like to focus on the neck today. She states neck pain begins in suboccipital region and radiates down into the upper traps bilaterally. She states every morning she spends about an hour stretching then gets into the bath to relax her muscles before she can start her day. She has a history of tachycardia and is currently wearing a Holter monitor to track the arrhythmias. She has an autoimmune disorder, causing chronic inflammation and pain affecting her ability to perform her daily activities. She also reports daily headaches.    Pts goals: to improve pain.    OBJECTIVE     Cervical Range of  Motion:    Degrees Pain   Flexion 70    Extension 70    Left Rotation 60    Right Rotation 55    Left Side Bending 40    Right Side Bending 40       Shoulder Range of Motion:   Left:  WNL  Right: WNL    Strength:   Left Right   DNF 3+/5    Upper trap 5/5 5/5   Mid trap 4+/5 4+/5   Lower trap 4-/5 4-/5   Rhomboids 4/5 4/5    35# 40#     Upper Extremity Strength   Left Right   Shoulder flexion: 5/5 5/5   Shoulder Abduction: 5/5 5/5   Shoulder ER 5/5 5/5   Shoulder IR 5/5 5/5   Elbow flexion: 5/5 5/5   Elbow extension: 5/5 5/5   Wrist flexion: 5/5 5/5   Wrist extension: 5/5 5/5       Special Tests:  Compression Negative   Spurlings Negative   Sharp-Veda Negative   Lateral Flexion Alar Ligament Negative     Upper Limb Neurodynamic testing:   Left  Right   UNT Negative  Negative   MNT Negative  Negative   RNT Negative  Negative         Joint Mobility: cervical transverse glides restricted on right in upper cervical spine,      Palpation: moderate tenderness to palpation at bilateral suboccipitals, right cervical paravertebral muscles, and bilateral upper traps      Flexibility:     Upper Trap = R no restriction, L no restriction   Scalenes: R min restriction, L no restriction   SCM: R min restriction, L no restriction   Levator Scap: R no restriction, L no restriction      PT Evaluation Completed? Yes  Discussed Plan of Care with patient: Yes        Treatment     Treatment Time In: 4:55 pm  Treatment Time Out: 5:15 pm  Total Treatment time separate from Evaluation: 20 minutes    Laurie received the following manual therapy techniques for 20 minutes:   Grade II upper cervical side glides  PROM cervical spine all planes except extension  Gentle soft tissue mobilization to bilateral cervical paravertebral muscles, bilateral suboccipitals, and bilateral upper traps        Patient Education and Home Exercises     Home Exercises and Patient Education Provided    Education provided re:   - progress towards goals   - role  of therapy in multi - disciplinary team, goals for therapy  Pt educated on condition, POC, and expectations in therapy.  No spiritual or educational barriers to learning provided    Home exercises:  Pt will be provided HEP during course of treatment with progressions as appropriate. Pt was advised to perform these exercises free of pain, and to stop performing them if pain occurs.   Laurie demonstrated good  understanding of the education provided.       ASSESSMENT      Luisa is a 39 y.o. female referred to outpatient physical therapy with a medical diagnosis of M47.812 (ICD-10-CM)- Cervical spondylosis, M54.81 (ICD-10-CM)- Bilateral occipital neuralgia, M47.816 (ICD-10-CM)- Lumbar spondylosis, and presents to PT with pain limiting function, weakness in deep neck flexors, lower traps, and  bilaterally and general malaise. Patient to benefit from skilled therapy to prevent further decline in functional status. Patient with good tolerance to initial treatment which focused on gentle mobility of the cervical spine and surrounding soft tissues. All questions and concerns addressed.  Patient demonstrates limitations as described in the problem list. Pt will benefit from physcial therapy services in order to maximize pain free mobility. The following goals were discussed with the patient and patient is in agreement with them as to be addressed in the treatment plan.     Pt prognosis is Fair.     Pt's spiritual, cultural and educational needs considered and pt agreeable to plan of care and goals as stated below:     Anticipated Barriers for therapy: co-morbidities    Plan of care discussed with patient: Yes    Pt will benefit from skilled outpatient Physical Therapy to address the deficits stated above and in the chart below, provide pt/family education, and to maximize pt's level of independence.     Medical necessity is demonstrated by the following IMPAIRMENTS/PROBLEM LIST:    weakness, impaired endurance, impaired  self care skills, impaired functional mobility, decreased upper extremity function, decreased lower extremity function, pain, impaired joint extensibility, and impaired muscle length      Medical Necessity is demonstrated by the following  History  Co-morbidities and personal factors that may impact the plan of care Co-morbidities/Personal Factors    0= low, 1-2 = mod, 3+ = high   high   Examination  Body Structures and Functions, activity limitations and participation restrictions that may impact the plan of care Body Regions/Body Systems    Participation Restrictions    Activity limitations  Mobility/Self care/Domestic Life/Community and Social Life    1-2 = low, 3+ = mod, 4+ = high       moderate   Clinical Presentation evolving clinical presentation with changing clinical characteristics  Stable/uncomplicated = low, evolving/changing = mod,  Unstable/unpredictable = high moderate   Decision Making/ Complexity Score: moderate           GOALS     Short Term Goals: 3 weeks  Demonstrate improvement in recent symptoms to progress toward long term goals  Correct postural deviations in sitting and standing to decrease pain and promote postural awareness for injury prevention.  Demonstrate compliance with initial exercise program    Long Term Goals: 6 weeks  Improve  strength by 5# bilaterally  Perform household and work tasks with minimal limitation  Report 50-75% decrease in frequency of headaches  Improve strength in mid back muscles and deep neck flexors by 1/2 MMT grade or better  Demonstrate independence with home exercise program to maintain gains made in therapy.      PLAN      Certification Period: 12/5/2024 to 2/28/2025.    Outpatient Physical Therapy 1-2 times weekly for 6 weeks to include the following interventions: patient education, Manual Therapy, Moist Heat/ Ice, Neuromuscular Re-ed, Patient Education, Self Care, Therapeutic Activities, Therapeutic Exercise, and Ultrasound.   Pt may be seen by PTA  as part of the rehabilitation team.     I certify the need for these services furnished under this plan of treatment and while under my care.    Ladonna Banda, PT        Attestation:   I have seen the patient, reviewed the therapist's plan of care, and I agree with the plan of care.   I certify the need for these services furnished under this plan of treatment and while under my care.         _______________            ________                                               _____________________  Physician/Referring Practitioner                                                            Date of Signature

## 2024-12-10 ENCOUNTER — PATIENT MESSAGE (OUTPATIENT)
Dept: NEUROLOGY | Facility: CLINIC | Age: 39
End: 2024-12-10
Payer: OTHER GOVERNMENT

## 2024-12-10 ENCOUNTER — PATIENT MESSAGE (OUTPATIENT)
Dept: UROGYNECOLOGY | Facility: CLINIC | Age: 39
End: 2024-12-10
Payer: OTHER GOVERNMENT

## 2024-12-11 ENCOUNTER — CLINICAL SUPPORT (OUTPATIENT)
Dept: REHABILITATION | Facility: HOSPITAL | Age: 39
End: 2024-12-11
Payer: OTHER GOVERNMENT

## 2024-12-11 DIAGNOSIS — G89.29 CHRONIC BILATERAL LOW BACK PAIN WITHOUT SCIATICA: ICD-10-CM

## 2024-12-11 DIAGNOSIS — M54.50 CHRONIC BILATERAL LOW BACK PAIN WITHOUT SCIATICA: ICD-10-CM

## 2024-12-11 DIAGNOSIS — M54.2 NECK PAIN: Primary | ICD-10-CM

## 2024-12-11 PROCEDURE — 97140 MANUAL THERAPY 1/> REGIONS: CPT

## 2024-12-11 NOTE — PROGRESS NOTES
[facility]   Physical Therapy Treatment Note       Name: Luisa Sullivan [facility]  Clinic Number: [id number]    Therapy Diagnosis:   Encounter Diagnoses   Name Primary?    Neck pain Yes    Chronic bilateral low back pain without sciatica      Physician: Konstantin Linder*    Visit Date: 12/11/2024    Physician Orders: PT Eval and Treat   Medical Diagnosis:   M47.812 (ICD-10-CM) - Cervical spondylosis   M54.81 (ICD-10-CM) - Bilateral occipital neuralgia   M47.816 (ICD-10-CM) - Lumbar spondylosis   Evaluation Date: 12/5/2024  Authorization period Expiration: 12/31/2024  Plan of Care Expiration: 2/28/2025  Progress Note Due: 1/17/2025  Visit #/Visits authorized: 1/ 1   FOTO: 1/ 3         Precautions: Standard and chronic inflammation , wearing Holter monitor, occipital neuralgia   Date of Surgery: NA     PTA Visit #: 0/5     Time In: 2 pm  Time Out: 245 pm  Total Appointment Time: 45 minutes  Total Billable Time: 45 minutes  SUBJECTIVE      Pt reports: she had increased inflammation after the eval that lasted about 24 hours, reporting a feeling in her throat like a cold was coming on. No increased pain in the neck.  Response to previous treatment: mild flare up  Functional change: none yet    Pain: 5/10  Location: bilateral cervical      History of Current Condition: Laurie reports: 2 year history of symptoms, including global pain and achiness with recent exacerbation in neck and back. Back pain is bilateral but has more pain on the right side, including her right hip, knee and ankle. This does not seem to follow a neurological pathway, more joint pain. She reports neck pain to be worse than back at this time and would like to focus on the neck today. She states neck pain begins in suboccipital region and radiates down into the upper traps bilaterally. She states every morning she spends about an hour stretching then gets into the bath to relax her muscles before she can start her  day. She has a history of tachycardia and is currently wearing a Holter monitor to track the arrhythmias. She has an autoimmune disorder, causing chronic inflammation and pain affecting her ability to perform her daily activities. She also reports daily headaches.    OBJECTIVE     Objective Measures updated at progress report unless specified.    Treatment   Laurie received the following manual therapy techniques for 40 minutes:   Grade II upper cervical side glides, rotational mobs  Grade II cervical posterior-anterior mobs  PROM cervical spine all planes except extension  Gentle soft tissue mobilization to bilateral cervical paravertebral muscles, bilateral suboccipitals, and bilateral upper traps  Manual stretching bilateral upper traps and levator scaps       Possible next visit   Deep neck flexors supine  Scap retractions  Prone W lift off/ wall W lift off  Supine horizontal abduction, yellow thera-band      Patient Education and Home Exercises     Home Exercises Provided and Patient Education Provided     Education provided:   - discussed hydration following soft tissue work  - monitor symptoms    Written Home Exercises Provided:  not yet .       ASSESSMENT      Performed gentle manual this date with rest breaks as needed. Patient with audible cavitation while performing PROM. Did increase pressure on paravertebral muscles this date, still very gentle with suboccipitals. Patient did demonstrate an inflammatory response during treatment, including redness around the area being worked and spreading to the anterior neck and chest. Patient reporting no other adverse effects during treatment.     Paste from lalo Moran is a 39 y.o. female referred to outpatient physical therapy with a medical diagnosis of M47.812 (ICD-10-CM)- Cervical spondylosis, M54.81 (ICD-10-CM)- Bilateral occipital neuralgia, M47.816 (ICD-10-CM)- Lumbar spondylosis, and presents to PT with pain limiting function, weakness in deep neck flexors,  lower traps, and  bilaterally and general malaise. Patient to benefit from skilled therapy to prevent further decline in functional status. Patient with good tolerance to initial treatment which focused on gentle mobility of the cervical spine and surrounding soft tissues. All questions and concerns addressed.     Pt will continue to benefit from skilled outpatient physical therapy to address the deficits listed in the problem list box on initial evaluation, provide pt/family education and to maximize pt's level of independence in the home and community environment.     Goals:   Short Term Goals: 3 weeks  Demonstrate improvement in recent symptoms to progress toward long term goals  Correct postural deviations in sitting and standing to decrease pain and promote postural awareness for injury prevention.  Demonstrate compliance with initial exercise program     Long Term Goals: 6 weeks  Improve  strength by 5# bilaterally  Perform household and work tasks with minimal limitation  Report 50-75% decrease in frequency of headaches  Improve strength in mid back muscles and deep neck flexors by 1/2 MMT grade or better  Demonstrate independence with home exercise program to maintain gains made in therapy.    PLAN   Certification Period: 12/5/2024 to 2/28/2025.     Outpatient Physical Therapy 1-2 times weekly for 6 weeks to include the following interventions: patient education, Manual Therapy, Moist Heat/ Ice, Neuromuscular Re-ed, Patient Education, Self Care, Therapeutic Activities, Therapeutic Exercise, and Ultrasound.   Pt may be seen by PTA as part of the rehabilitation team.     Continue to progress per plan of care. Advance as tolerated     Ladonna Banda, PT

## 2024-12-16 ENCOUNTER — PROCEDURE VISIT (OUTPATIENT)
Dept: NEUROLOGY | Facility: CLINIC | Age: 39
End: 2024-12-16
Payer: OTHER GOVERNMENT

## 2024-12-16 DIAGNOSIS — R20.2 PARESTHESIA OF BOTH LOWER EXTREMITIES: ICD-10-CM

## 2024-12-16 PROCEDURE — 95886 MUSC TEST DONE W/N TEST COMP: CPT | Mod: PBBFAC,PN | Performed by: PHYSICAL MEDICINE & REHABILITATION

## 2024-12-16 PROCEDURE — 95913 NRV CNDJ TEST 13/> STUDIES: CPT | Mod: 26,S$PBB,, | Performed by: PHYSICAL MEDICINE & REHABILITATION

## 2024-12-16 PROCEDURE — 95886 MUSC TEST DONE W/N TEST COMP: CPT | Mod: 26,S$PBB,, | Performed by: PHYSICAL MEDICINE & REHABILITATION

## 2024-12-16 PROCEDURE — 95913 NRV CNDJ TEST 13/> STUDIES: CPT | Mod: PBBFAC,PN | Performed by: PHYSICAL MEDICINE & REHABILITATION

## 2024-12-16 NOTE — PROCEDURES
Test Date:  2024    Patient: tiara moffett : 1985 Physician: Carlos Nelson D.O.   ID#: [id number]  SEX: Female Ref. Phys: Rylie Bustamante MD     HPI: Luisa Moffett is a 39 y.o.female who presents for NCS/EMG to evaluate for large fiber polyneuropathy.    PROCEDURE:  Prior to the procedure, the procedure was discussed in detail with the patient.  All questions were answered, and verbal consent was obtained.  For nerve conduction studies, a combination of surface electrodes, bar electrodes, and/or ring electrodes were used as needed.  For needle EMG, each site was cleaned and prepped in usual fashion with an alcohol pad.  A monopolar needle (28G) was used.  There was no significant bleeding, and bandages were applied as needed.  The procedure was tolerated without adverse effect.  The patient was instructed on post-procedure care including ice if needed for 10-15 minutes up to 4 times/day for any sore muscles.  I discussed with the patient that the data would be reviewed and a report sent to the referring provider, where any follow up questions regarding next steps should be directed.        NCV & EMG Findings:  Evaluation of the right Median-Radial (Dig I) sensory nerve showed abnormal peak latency difference ((Median Wrist-Dig I)-(Radial Wrist)).  The right Median-Ulnar Palmar sensory nerve showed abnormal peak latency difference ((Median Palm-Wrist)-(Ulnar Palm)).  All remaining nerves (as indicated in the following tables) were within normal limits.  All examined muscles (as indicated in the following table) showed no evidence of electrical instability.      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 Carpal tunnel syndrome).  There is no motor axonal loss.  This is graded as Mild in severity on the right.    There is no evidence of a large fiber peripheral polyneuropathy.    Right thumb localized pain and thumb  locking is likely trigger thumb vs arthritis.  Consider referral to ortho hand to evaluate further.        ___________________________  Carlos Nelson D.O.        NCS+  Motor Nerve Results      Latency Amplitude F-Lat Segment Distance CV Comment   Site (ms) Norm (mV) Norm (ms)  (cm) (m/s) Norm    Left Median (APB)   Wrist 3.0  < 4.4 7.7  > 4.2         Elbow 6.6 - 7.1 -  Elbow-Wrist 22 61  > 51    Right Median (APB)   Wrist 3.4  < 4.4 11.9  > 4.2         Elbow 7.1 - 11.4 -  Elbow-Wrist 23 62  > 51    Left Ulnar (ADM)   Wrist 2.5  < 3.7 11.4  > 3.0         Bel Elbow 5.5 - 11.0 -  Bel Elbow-Wrist 20 67  > 52    Abv Elbow 6.3 - 11.1 -  Abv Elbow-Bel Elbow 7 88  > 43    Right Ulnar (ADM)   Wrist 2.5  < 3.7 12.3  > 3.0         Bel Elbow 6.1 - 11.3 -  Bel Elbow-Wrist 22 61  > 52    Abv Elbow 7.3 - 10.4 -  Abv Elbow-Bel Elbow 8 67  > 43    Left Fibular (EDB)   Ankle 2.6  < 6.5 6.1  > 1.10         Bel Fib Head 8.8 - 5.1 -  Bel Fib Head-Ankle 36 58  > 39    Pop Fossa 9.9 - 5.1 -  Pop Fossa-Bel Fib Head 9 82  > 42    Right Fibular (EDB)   Ankle 2.6  < 6.5 7.7  > 1.10         Bel Fib Head 8.2 - 4.9 -  Bel Fib Head-Ankle 32 57  > 39    Pop Fossa 9.3 - 6.4 -  Pop Fossa-Bel Fib Head 8 73  > 42    Left Tibial (AHB)   Ankle 4.8  < 6.1 11.3  > 5.3 43.4        Right Tibial (AHB)   Ankle 3.1  < 6.1 18.9  > 5.3 44.6          Sensory Nerve Results      Start Lat Latency (Peak) Amplitude (P-P) Segment Distance Start CV Comment   Site (ms) Norm (ms) (µV) Norm  (cm) (m/s) Norm    Left Median   Wrist-Dig I 2.0 - 2.7 89 - Wrist-Dig I 10 50 -    Wrist-Dig II 2.6  < 3.3 3.3 107  > 13 Wrist-Dig II 14 54  > 42    Palm-Wrist 1.38 - 1.88 79 - Palm-Wrist - - -    Right Median   Wrist-Dig I 2.4 - 3.0 73 - Wrist-Dig I 10 42 -    Wrist-Dig II 3.2  < 3.3 4.0 75  > 13 Wrist-Dig II 14 44  > 42    Palm-Wrist 1.73 - 2.2 93 - Palm-Wrist - - -    Left Ulnar (Rec:Wrist)   Palm 0.63 - 1.98 32 - Palm-Wrist - - -    Right Ulnar (Rec:Wrist)   Palm 1.28 - 1.70  35 - Palm-Wrist - - -    Left Radial (Rec:Dig I)   Wrist 2.1 - 2.8 12 - Wrist-Dig I 10 48 -    Right Radial (Rec:Dig I)   Wrist 1.63 - 2.3 21 - Wrist-Dig I 10 61 -    Left Sural (Rec:Lat Mall)   Calf 1.13  < 3.6 1.75 31  > 4 Calf-Lat Mall 14 124  > 39    Right Sural (Rec:Lat Mall)   Calf 1.40  < 3.6 1.88 10  > 4 Calf-Lat Mall 14 100  > 39      Inter-Nerve Comparisons     Nerve 1 Value 1 Nerve 2 Value 2 Parameter Result Normal   Sensory Sites   R Median Wrist-Dig I 3.0 ms R Radial Wrist-Dig I 2.3 ms Peak Lat Diff *0.70 ms <0.40   L Median Wrist-Dig I 2.7 ms L Radial Wrist-Dig I 2.8 ms Peak Lat Diff 0.10 ms <0.40   R Median Palm-Wrist 2.2 ms R Ulnar Palm-Wrist 1.70 ms Peak Lat Diff *0.50 ms <0.30   L Median Palm-Wrist 1.88 ms L Ulnar Palm-Wrist 1.98 ms Peak Lat Diff 0.10 ms <0.30     EMG+     Side Muscle Nerve Root Ins Act Fibs Psw Amp Dur Poly Recrt Int Pat Comment   Right Vastus Med Femoral L2-L4 Nml Nml Nml Nml Nml 0 Nml Nml    Right Tib Anterior Fibular,  Deep Fibula... L4-L5 Nml Nml Nml Nml Nml 0 Nml Nml    Right Fib Longus Fibular,  Superficial... L5-S1 Nml Nml Nml Nml Nml 0 Nml Nml    Right Gastroc Tibial S1-S2 Nml Nml Nml Nml Nml 0 Nml Nml    Right Dorsal Interossei ped I Lateral Plantar S2-S3 Nml Nml Nml Nml Nml 0 Nml Nml    Left Vastus Med Femoral L2-L4 Nml Nml Nml Nml Nml 0 Nml Nml    Left Tib Anterior Fibular,  Deep Fibula... L4-L5 Nml Nml Nml Nml Nml 0 Nml Nml    Left Fib Longus Fibular,  Superficial... L5-S1 Nml Nml Nml Nml Nml 0 Nml Nml    Left Gastroc Tibial S1-S2 Nml Nml Nml Nml Nml 0 Nml Nml    Left Dorsal Interossei ped I Lateral Plantar S2-S3 Nml Nml Nml Nml Nml 0 Nml Nml    Right Deltoid Axillary C5-C6 Nml Nml Nml Nml Nml 0 Nml Nml    Right Triceps Radial C6-C8 Nml Nml Nml Nml Nml 0 Nml Nml    Right Biceps Musculocut C5-C6 Nml Nml Nml Nml Nml 0 Nml Nml    Right Pronator Teres Median C6-C7 Nml Nml Nml Nml Nml 0 Nml Nml    Right FDI Ulnar C8-T1 Nml Nml Nml Nml Nml 0 Nml Nml             Waveforms:    Motor                         F-Wave       Sensory

## 2024-12-17 ENCOUNTER — OFFICE VISIT (OUTPATIENT)
Dept: NEUROLOGY | Facility: CLINIC | Age: 39
End: 2024-12-17
Payer: OTHER GOVERNMENT

## 2024-12-17 DIAGNOSIS — M79.2 NEUROPATHIC PAIN: ICD-10-CM

## 2024-12-17 DIAGNOSIS — G44.86 CERVICOGENIC HEADACHE: Primary | ICD-10-CM

## 2024-12-17 DIAGNOSIS — M54.50 CHRONIC MIDLINE LOW BACK PAIN, UNSPECIFIED WHETHER SCIATICA PRESENT: ICD-10-CM

## 2024-12-17 DIAGNOSIS — R20.2 PARESTHESIA OF BOTH LOWER EXTREMITIES: ICD-10-CM

## 2024-12-17 DIAGNOSIS — G89.29 CHRONIC MIDLINE LOW BACK PAIN, UNSPECIFIED WHETHER SCIATICA PRESENT: ICD-10-CM

## 2024-12-17 PROCEDURE — 99214 OFFICE O/P EST MOD 30 MIN: CPT | Mod: 95,,, | Performed by: STUDENT IN AN ORGANIZED HEALTH CARE EDUCATION/TRAINING PROGRAM

## 2024-12-17 RX ORDER — MELOXICAM 15 MG/1
15 TABLET ORAL DAILY
Qty: 30 TABLET | Refills: 0 | Status: SHIPPED | OUTPATIENT
Start: 2024-12-17

## 2024-12-17 NOTE — PROGRESS NOTES
"        Patient ID: [id number]  The patient location is: [facility]   The chief complaint leading to consultation is: f/u on neuropathic pain and other symptoms     Visit type: audiovisual    Face to Face time with patient: 21    Each patient to whom he or she provides medical services by telemedicine is:  (1) informed of the relationship between the physician and patient and the respective role of any other health care provider with respect to management of the patient; and (2) notified that he or she may decline to receive medical services by telemedicine and may withdraw from such care at any time.    Notes:     Subjective:     HPI  Luisa Jesus is a pleasant 39 y.o. RH female with POTS/dysautonomia, cardiac arrhythmia, IBS, fibromyalgia, and unidentified rheumatologic/connective tissue disorder. she is presenting today for follow up.      Interval history (12/17/2024):  Her ALEXIS was repeated positive with higher titer with nuclear pattern, she is receiving this treatment for SLE.   Humira was helping skin rash a bit but not the pain.    She was switched to Actemra as of last week.  She was also seen by uro gynecology who started her on gemtesa.   Lumbar spine MRI showed multilevel DDD for which I referred her to spine surgery.  She underwent L4-L5  JANETH in mid November with no significant improvement in pain.  EMG only showed mild right CTS and no large fiber neuropathy.  We increased Cymbalta to 60 mg daily, it was initially helpful but then the symptoms recurred.  Did not like gabapentin at all in the past, made her extremely drowsy.     She was also referred to PT which has been focusing on the neck pain first.  Regarding headaches, she reports constant pain and pressure, in the occipital area, 4 out of 10 with occasional worsening.    Initial HPI (10/10/2024):  She reports numbness, tingling, vibration, "electric shocks" in random spots in the body that have been going on for about a year. "   Reports new vertigo, tinnitus, blurred and double vision this summer. She has also incoordination and is dropping objects more often.  She has been evaluated by ENT and undergone audiology and VNG which were reportedly normal.  Eye evaluation has been reportedly normal as well.   Reports frequent stumbling and tripping over her own feet.  She reports urinary frequency and nocturia x1 year. Denies change in bowel habits. Denies incontinence with either.  She sometimes has episodes of tight sensation around the rib cage that wake her up in the middle of the night that may last 2 hours.  She has positive ALEXIS and arthralgia. Follows with a rheumatologist and receives Humira for presumed connective tissue disease.    Relevant history:   Diabetes Mellitus: (--)  Bariatric surgery: (--)  Vegan/vegetarian: (+), for 4 years after 2014, has Hx of B12 deficiency   Celiac/Crohn's/UC:(--)  Substance use (juan. Nitrous oxide): (--)  Cervical/lumbar spondylosis: (+)  Recent immunization: (--)    Review of Systems   Constitutional:  Positive for fatigue.   HENT:  Negative for trouble swallowing.    Gastrointestinal:  Negative for constipation and fecal incontinence.   Genitourinary:  Negative for bladder incontinence and difficulty urinating.   Musculoskeletal:  Positive for back pain and neck pain. Negative for joint swelling.   Neurological:  Positive for dizziness, numbness and headaches. Negative for weakness.   Psychiatric/Behavioral:  Negative for agitation and confusion.      Past Medical History:  -------------------------------------    Allergy    Atrial fibrillation    aproxx    IBS (irritable bowel syndrome)    SVT (supraventricular tachycardia)    Thrush    recurrent     Allergies:  Review of patient's allergies indicates:   Allergen Reactions    Diclofenac sodium Other (See Comments)     Other reaction(s): Bloody stool    Ortho tri-cyclen (21) Hives    Sulfa (sulfonamide antibiotics) Hives and Rash    Scopolamine      Sulfur Hives    Bactrim [sulfamethoxazole-trimethoprim] Rash       Pertinent Family History:  Family History   Adopted: Yes   Problem Relation Name Age of Onset    Heart disease Maternal Grandmother      Eclampsia Neg Hx      Colon cancer Neg Hx         Pertinent Social History:  Social History     Tobacco Use    Smoking status: Never    Smokeless tobacco: Never   Substance Use Topics    Alcohol use: Yes     Comment: rarely    Drug use: No       Medications:  Current Outpatient Medications   Medication Instructions    aspirin 81 MG Chew No dose, route, or frequency recorded.    betamethasone dipropionate (DIPROLENE) 0.05 % ointment Apply topically.    bran/gum/fib/steven/psyl/kelp/pec (FIBER 6 ORAL)     CHOLECALCIFEROL, VITAMIN D3, (VITAMIN D3 ORAL) Take by mouth.    diltiaZEM (CARDIZEM CD) 240 mg, Oral, Daily    DULoxetine (CYMBALTA) 60 mg, Oral, Daily    FLONASE ALLERGY RELIEF 50 mcg/actuation nasal spray     fluconazole (DIFLUCAN) 150 mg, Once    HUMIRA(CF) PEN 40 mg, Every 14 days    magnesium oxide 200 mg magnesium Chew Take by mouth.    multivit with calcium,iron,min (MULTIVITAMIN-CALCIUM AND IRON ORAL) No dose, route, or frequency recorded.    omeprazole (PRILOSEC) 40 MG capsule See Instructions, TAKE 1 CAPSULE BY MOUTH DAILY, # 90 cap, 0 Refill(s), Maintenance, Pharmacy: [id number], 159, cm, 09/10/24 15:40:00 CDT, Height/Length Measured, 79.6, kg, 09/10/24 15:40:00 CDT, Weight Dosing    turmeric root extract 500 mg Cap       Objective:     *exam is limited due to the virtual nature of this visit    General:  Well-appearing, well-nourished, NAD, cooperative    Neurologic Exam:   Awake, alert and oriented x3  Speech spontaneous and fluent, intact comprehension.   Adequate fund of knowledge, vocabulary.  EOM intact. No ophthalmoplegia.   Facial expression is full and symmetric.   Hearing is intact.  Antigravity in BUE. No tremor.      Pertinent lab results  Lab Results   Component Value Date     ALPHATOCOPHE 1688 10/10/2024    VITAMINB6 31 10/10/2024    SEGAXMFS99YX 43 08/04/2014    ZINC 107 10/10/2024    COPPER 909 10/10/2024     Lab Results   Component Value Date    PATHINTPSPE REVIEWED 10/10/2024    PATHINTPSIF REVIEWED 10/10/2024     Lab Results   Component Value Date    ANASCREEN Negative <1:160 01/27/2014    ANTISSAANTIB 2.34 01/27/2014    RF <10.0 01/27/2014    SEDRATE 6 01/27/2014    COMPLEMENTC4 15 08/04/2014    COMPLEMENTC3 91 08/04/2014    ANTIGLIADINA 0.5 10/10/2024    ANTIGLIADIN <0.6 10/10/2024    TTGIGA <0.2 10/10/2024    TTGIGG <0.6 10/10/2024    CELACIMMUNA 188 10/10/2024     Lab Results   Component Value Date    IGGSERUM 865 08/04/2014     08/04/2014    IGM 77 08/04/2014    TSH 1.325 08/04/2014    WBC 4.82 08/04/2014    LYMPH 1.8 08/04/2014    LYMPH 36.7 08/04/2014    RBC 4.39 08/04/2014    HGB 12.0 08/04/2014    HCT 39.8 08/04/2014    MCV 91 08/04/2014     08/04/2014     08/04/2014    K 4.0 08/04/2014    CO2 20 (L) 08/04/2014    BUN 10 08/04/2014    CREATININE 0.7 08/04/2014    CALCIUM 9.0 08/04/2014    AST 18 08/04/2014    ALT 11 08/04/2014     *Outside labs reviewed in person during the encounter on 10/10/2024:  7/25/24  Esr 3  Cbc nl  Ck 56  HBsAg -  HCV -  Cmp nl  Mg 2  Crp 0.15  A1c 4.9  Vit 84  B12 751  folate >20  HIV-  RF -  ALEXIS+ (cyto, 1:80)  The rest negative  Myomarker -    EBV Ag IgG 276  EBV Ag IgM -  CCP -  Aldolase 3.1    Pertinent imaging results    *Images personally reviewed and interpreted:  10/17/2024  MRI Lumbar Spine w/wo contrast:  Multilevel DDD most pronounced at L3-4, L4-5, and L5-S1.  There is facet joint arthropathy at these levels.  There is mild crowding of the lateral recess bilaterally as well as mild bilateral foraminal stenosis at the L3-4 level.  3. At the L4-5 level there is mild spinal stenosis without foraminal stenosis but there is crowding of the left lateral recess with mild swelling of the left L5 root.    *previously reviewed  images:  10/3/2024  MRI Brain w/wo contrast:  3-4 non-specific T2/FLAIR punctate foci scattered throughout the subcortical white matter    MRI Cervical Spine w/wo contrast:  Disc protrusion at C6-7 with no compression on the cord    MRI Thoracic Spine w/wo contrast:  No cord lesions    Other pertinent studies  *personally reviewed results:  12/16/2024  EMG-NCS:  There is electrophysiologic evidence of a right sensory median mononeuropathy across the wrist (I.e. Carpal tunnel syndrome).  There is no motor axonal loss.  This is graded as Mild in severity on the right.    There is no evidence of a large fiber peripheral polyneuropathy.    Right thumb localized pain and thumb locking is likely trigger thumb vs arthritis.  Consider referral to ortho hand to evaluate further.      11/10/2023  EMG-NCS:  Mild to moderate severity right carpal tunnel syndrome  Mild left carpal tunnel syndrome    Assessment:   Luisa Jesus is a 39 y.o. RH female with POTS/dysautonomia, cardiac arrhythmia, IBS, fibromyalgia, and lupus-like rheumatologic disorder, recently started on Actemra. She continues to report generalized paresthesias and neuropathic pain, so far MRI of brain and c-spine were negative for demyelinating lesions, EMG-NCS was negative for large fiber neuropathy, and serum work up did not suggest any etiologies for peripheral neuropathy. She is undergoing PT for neck followed by lumbar spine PT. She may benefit from an anti-inflammatory course (unfortunately she has sulfa allergy and we can not try Celebrex, therefore we will do a trial of meloxicam), this could alleviate generalized/axial pain as well as cervicogenic headaches. Additionally we discussed risk and benefits of trying Lyrica vs elavil for neuropathic pain, I provided reading material for both drugs. If Actrema fails to control her pain one of these options could be added to her regimen.     1. Cervicogenic headache    2. Chronic midline low back pain,  unspecified whether sciatica present    3. Neuropathic pain    4. Paresthesia of both lower extremities      Plan:     - meloxicam (MOBIC) 15 MG tablet; Take 1 tablet (15 mg total) by mouth once daily.  Dispense: 30 tablet; Refill: 0        Plan was discussed in detail with the patient, who is in agreement.      Disclaimer: This note was partly generated using dictation software which may occasionally result in transcription errors that are missed on review.      Based on our encounter today, my overall Medical Decision Making is a Level 4     Complexity of Problem: Moderate (1 or more chronic illnesses with exacerbation, progression or treatment side effects)  Complexity of Data: Extensive (Review of >3 unique test results and Independent interpretation of tests)  Risk of Complications and/or morbidity/mortality of Management: Moderate risk (Prescription drug management)          Rylie Bustamante MD    [facility]  12/17/2024

## 2024-12-18 ENCOUNTER — OFFICE VISIT (OUTPATIENT)
Dept: UROGYNECOLOGY | Facility: CLINIC | Age: 39
End: 2024-12-18
Payer: OTHER GOVERNMENT

## 2024-12-18 DIAGNOSIS — N30.90 CYSTITIS: ICD-10-CM

## 2024-12-18 DIAGNOSIS — N39.41 URGE INCONTINENCE OF URINE: ICD-10-CM

## 2024-12-18 DIAGNOSIS — R35.0 URINARY FREQUENCY: Primary | ICD-10-CM

## 2024-12-18 LAB
BILIRUBIN, UA POC OHS: NEGATIVE
BLOOD, UA POC OHS: NEGATIVE
CLARITY, UA POC OHS: CLEAR
COLOR, UA POC OHS: YELLOW
GLUCOSE, UA POC OHS: NEGATIVE
KETONES, UA POC OHS: NEGATIVE
LEUKOCYTES, UA POC OHS: NEGATIVE
NITRITE, UA POC OHS: NEGATIVE
PH, UA POC OHS: 6
PROTEIN, UA POC OHS: NEGATIVE
SPECIFIC GRAVITY, UA POC OHS: >=1.03
UROBILINOGEN, UA POC OHS: 0.2

## 2024-12-18 PROCEDURE — 99213 OFFICE O/P EST LOW 20 MIN: CPT | Mod: PBBFAC,PO | Performed by: NURSE PRACTITIONER

## 2024-12-18 PROCEDURE — 81003 URINALYSIS AUTO W/O SCOPE: CPT | Mod: PBBFAC,PO | Performed by: NURSE PRACTITIONER

## 2024-12-18 PROCEDURE — 51700 IRRIGATION OF BLADDER: CPT | Mod: S$PBB,,, | Performed by: NURSE PRACTITIONER

## 2024-12-18 PROCEDURE — 99999PBSHW PR PBB SHADOW TECHNICAL ONLY FILED TO HB: Mod: PBBFAC,,,

## 2024-12-18 PROCEDURE — 99999PBSHW POCT URINALYSIS(INSTRUMENT): Mod: PBBFAC,,,

## 2024-12-18 PROCEDURE — 99999 PR PBB SHADOW E&M-EST. PATIENT-LVL III: CPT | Mod: PBBFAC,,, | Performed by: NURSE PRACTITIONER

## 2024-12-18 PROCEDURE — 99214 OFFICE O/P EST MOD 30 MIN: CPT | Mod: S$PBB,25,, | Performed by: NURSE PRACTITIONER

## 2024-12-18 PROCEDURE — 51700 IRRIGATION OF BLADDER: CPT | Mod: PBBFAC,PO | Performed by: NURSE PRACTITIONER

## 2024-12-18 RX ORDER — GENTAMICIN 40 MG/ML
80 INJECTION, SOLUTION INTRAMUSCULAR; INTRAVENOUS ONCE
Status: COMPLETED | OUTPATIENT
Start: 2024-12-18 | End: 2024-12-18

## 2024-12-18 RX ORDER — HEPARIN SODIUM 5000 [USP'U]/ML
20000 INJECTION, SOLUTION INTRAVENOUS; SUBCUTANEOUS ONCE
Status: COMPLETED | OUTPATIENT
Start: 2024-12-18 | End: 2024-12-18

## 2024-12-18 RX ORDER — LIDOCAINE HYDROCHLORIDE 20 MG/ML
20 INJECTION, SOLUTION INFILTRATION; PERINEURAL
Status: COMPLETED | OUTPATIENT
Start: 2024-12-18 | End: 2024-12-18

## 2024-12-18 RX ADMIN — GENTAMICIN SULFATE 80 MG: 40 INJECTION, SOLUTION INTRAMUSCULAR; INTRAVENOUS at 03:12

## 2024-12-18 RX ADMIN — LIDOCAINE HYDROCHLORIDE 20 ML: 20 INJECTION, SOLUTION INFILTRATION; PERINEURAL at 03:12

## 2024-12-18 RX ADMIN — HEPARIN SODIUM 20000 UNITS: 5000 INJECTION, SOLUTION INTRAVENOUS; SUBCUTANEOUS at 03:12

## 2024-12-18 NOTE — PROGRESS NOTES
Subjective:       Patient ID: Luisa Jesus is a 39 y.o. female.    Chief Complaint: instillation      Luisa Jesus is a 39 y.o. female.  Who presents today for instillation.  She was last seen in our office on 12/04/2024.  She did have some success with a decrease in her frequency and urgency after doing an anesthetic challenge.  We decided to try a series of instillations and she had her 1st instillation on 12/04/2024.  She felt that this helped her somewhat.  She was unable to come in last week as she had a another doctor appointment.  As she sits here today she still continues to feel that the instillations are helping a little bit but not completely.  NP did review possible cysto with hydro as well as trying pelvic floor physical therapy and or hydroxyzine or Elavil.  The patient is doing several weeks of physical therapy for her neck and back so does not feel that she could add in the pelvic floor at this time.  She does tend to have reactions to multiple medications so is leery about adding in any new medication.  She wants to read about the cysto with hydro for possible Botox of the bladder before pursuing this.  She would like to proceed with the instillation today.  She denies any acute complaints/concerns at this time.  She was recently placed on a new biologic and is wanting to see if this is helpful for her bladder as well.    Review of Systems   Constitutional:  Negative for activity change, fever and unexpected weight change.   HENT:  Negative for hearing loss.    Eyes:  Negative for visual disturbance.   Respiratory:  Negative for shortness of breath and wheezing.    Cardiovascular:  Negative for chest pain, palpitations and leg swelling.   Gastrointestinal:  Negative for abdominal pain, constipation and diarrhea.   Genitourinary:  Positive for frequency and urgency. Negative for dyspareunia, dysuria, vaginal bleeding and vaginal discharge.   Musculoskeletal:  Negative for gait problem  and neck pain.   Skin:  Negative for rash and wound.   Allergic/Immunologic: Negative for immunocompromised state.   Neurological:  Negative for tremors, speech difficulty and weakness.   Hematological:  Does not bruise/bleed easily.   Psychiatric/Behavioral:  Negative for agitation and confusion.        Objective:      Physical Exam  Vitals reviewed. Exam conducted with a chaperone present.   Constitutional:       General: She is not in acute distress.     Appearance: She is well-developed.   HENT:      Head: Normocephalic and atraumatic.   Neck:      Thyroid: No thyromegaly.   Pulmonary:      Effort: Pulmonary effort is normal. No respiratory distress.   Abdominal:      Palpations: Abdomen is soft.      Tenderness: There is no abdominal tenderness.      Hernia: No hernia is present.   Musculoskeletal:         General: Normal range of motion.      Cervical back: Normal range of motion.   Skin:     General: Skin is warm and dry.      Findings: No rash.   Neurological:      Mental Status: She is alert and oriented to person, place, and time.   Psychiatric:         Mood and Affect: Mood normal.         Behavior: Behavior normal.         Thought Content: Thought content normal.       Pelvic Exam:  V: No lesions. No palpable nodes.   Meatus:No caruncle or stenosis  Urethra: Non tender. No suburethral masses.  BL: Non tender  RV: No hemorrhoids.      Assessment:       1. Urinary frequency    2. Urge incontinence of urine    3. Cystitis        Procedure note- After betadine irrigation of the urethra, lidocaine instilled via urojet.   A #14 Slovenian red rubber tip catheter was inserted into the bladder.  10 mls of residual urine noted.  80 mg of gentamicin, 69793 units of heparin, and 20 mls of 2% lidocaine instilled into bladder.  Pt instructed to hold mixture for at least 1 hour prior to voiding.  Pt verbalized understanding.       Plan:       Urinary frequency monitor    Urge incontinence of urine monitor    Cystitis  instillation as noted above  -     LIDOcaine HCL 20 mg/ml (2%) injection 20 mL  -     heparin (porcine) injection 20,000 Units  -     gentamicin injection 80 mg        RTC 2 weeks

## 2024-12-20 ENCOUNTER — CLINICAL SUPPORT (OUTPATIENT)
Dept: REHABILITATION | Facility: HOSPITAL | Age: 39
End: 2024-12-20
Payer: OTHER GOVERNMENT

## 2024-12-20 DIAGNOSIS — M54.2 NECK PAIN: Primary | ICD-10-CM

## 2024-12-20 DIAGNOSIS — G89.29 CHRONIC BILATERAL LOW BACK PAIN WITHOUT SCIATICA: ICD-10-CM

## 2024-12-20 DIAGNOSIS — M54.50 CHRONIC BILATERAL LOW BACK PAIN WITHOUT SCIATICA: ICD-10-CM

## 2024-12-20 PROCEDURE — 97110 THERAPEUTIC EXERCISES: CPT

## 2024-12-20 NOTE — PROGRESS NOTES
[facility]   Physical Therapy Treatment Note       Name: Luisa Sullivan [facility]  Clinic Number: [id number]    Therapy Diagnosis:   Encounter Diagnoses   Name Primary?    Neck pain Yes    Chronic bilateral low back pain without sciatica      Physician: Konstantin Linder*    Visit Date: 12/20/2024    Physician Orders: PT Eval and Treat   Medical Diagnosis:   M47.812 (ICD-10-CM) - Cervical spondylosis   M54.81 (ICD-10-CM) - Bilateral occipital neuralgia   M47.816 (ICD-10-CM) - Lumbar spondylosis   Evaluation Date: 12/5/2024  Authorization period Expiration: 12/31/2024  Plan of Care Expiration: 2/28/2025  Progress Note Due: 1/17/2025  Visit #/Visits authorized: 3/12   FOTO: 1/ 3         Precautions: Standard and chronic inflammation , wearing Holter monitor, occipital neuralgia   Date of Surgery: NA     PTA Visit #: 0/5     Time In: 2:00  Time Out: 2:45  Total Billable Time: 45 minutes  SUBJECTIVE      Pt reports: she had increased inflammation after the eval that lasted about 24 hours, reporting a feeling in her throat like a cold was coming on. No increased pain in the neck.  Response to previous treatment: since her last treatment session, she felt a pop in her neck while rotating to her left and since than time she feels a pop   Functional change: none yet    Pain: 4/10  Location: bilateral cervical      History of Current Condition: Laurie reports: 2 year history of symptoms, including global pain and achiness with recent exacerbation in neck and back. Back pain is bilateral but has more pain on the right side, including her right hip, knee and ankle. This does not seem to follow a neurological pathway, more joint pain. She reports neck pain to be worse than back at this time and would like to focus on the neck today. She states neck pain begins in suboccipital region and radiates down into the upper traps bilaterally. She states every morning she spends about an hour stretching  "then gets into the bath to relax her muscles before she can start her day. She has a history of tachycardia and is currently wearing a Holter monitor to track the arrhythmias. She has an autoimmune disorder, causing chronic inflammation and pain affecting her ability to perform her daily activities. She also reports daily headaches.    OBJECTIVE     Objective Measures updated at progress report unless specified.    Treatment   Laurie received the following manual therapy techniques for 40 minutes:   Grade II upper cervical side glides, rotational mobs  Grade II cervical posterior-anterior mobs  PROM cervical spine all planes except extension  Gentle soft tissue mobilization to bilateral cervical paravertebral muscles, bilateral suboccipitals, and bilateral upper traps  Manual stretching bilateral upper traps and levator scaps     Laurie performed the following therapeutic exercises for 45 minutes:    CROM in all planes x 5  Chin tucks x 10  Levator scapulae 5 x 5"  Head tilt stretch 5 x 5"  Scapular retractions 10 x 5"  Rowing RTB x 15  Cervical isometrics in all planes x 5      Possible next visit   Deep neck flexors supine  Scap retractions  Prone W lift off/ wall W lift off  Supine horizontal abduction, yellow thera-band      Patient Education and Home Exercises     Home Exercises Provided and Patient Education Provided     Education provided:   - discussed hydration following soft tissue work  - monitor symptoms    Written Home Exercises Provided:  not yet .       ASSESSMENT      Performed gentle manual this date with rest breaks as needed. Patient with audible cavitation while performing PROM. Did increase pressure on paravertebral muscles this date, still very gentle with suboccipitals. Patient did demonstrate an inflammatory response during treatment, including redness around the area being worked and spreading to the anterior neck and chest. Patient reporting no other adverse effects during treatment. "     Paste from lalo Marroquinie is a 39 y.o. female referred to outpatient physical therapy with a medical diagnosis of M47.812 (ICD-10-CM)- Cervical spondylosis, M54.81 (ICD-10-CM)- Bilateral occipital neuralgia, M47.816 (ICD-10-CM)- Lumbar spondylosis, and presents to PT with pain limiting function, weakness in deep neck flexors, lower traps, and  bilaterally and general malaise. Patient to benefit from skilled therapy to prevent further decline in functional status. Patient with good tolerance to initial treatment which focused on gentle mobility of the cervical spine and surrounding soft tissues. All questions and concerns addressed.     Pt will continue to benefit from skilled outpatient physical therapy to address the deficits listed in the problem list box on initial evaluation, provide pt/family education and to maximize pt's level of independence in the home and community environment.     Goals:   Short Term Goals: 3 weeks  Demonstrate improvement in recent symptoms to progress toward long term goals  Correct postural deviations in sitting and standing to decrease pain and promote postural awareness for injury prevention.  Demonstrate compliance with initial exercise program     Long Term Goals: 6 weeks  Improve  strength by 5# bilaterally  Perform household and work tasks with minimal limitation  Report 50-75% decrease in frequency of headaches  Improve strength in mid back muscles and deep neck flexors by 1/2 MMT grade or better  Demonstrate independence with home exercise program to maintain gains made in therapy.    PLAN   Certification Period: 12/5/2024 to 2/28/2025.     Outpatient Physical Therapy 1-2 times weekly for 6 weeks to include the following interventions: patient education, Manual Therapy, Moist Heat/ Ice, Neuromuscular Re-ed, Patient Education, Self Care, Therapeutic Activities, Therapeutic Exercise, and Ultrasound.   Pt may be seen by PTA as part of the rehabilitation team.     Continue  to progress per plan of care. Advance as tolerated     Elsa Burgess, PT

## 2024-12-27 ENCOUNTER — TELEPHONE (OUTPATIENT)
Dept: CARDIOLOGY | Facility: CLINIC | Age: 39
End: 2024-12-27
Payer: OTHER GOVERNMENT

## 2024-12-27 ENCOUNTER — PATIENT MESSAGE (OUTPATIENT)
Dept: CARDIOLOGY | Facility: CLINIC | Age: 39
End: 2024-12-27
Payer: OTHER GOVERNMENT

## 2024-12-27 NOTE — TELEPHONE ENCOUNTER
----- Message from Danilo Hrenandez MD sent at 12/27/2024  4:03 PM CST -----  Still with a good amount of tachycardia. Options includes increasing diltiazem dose or adding beta-blocker for symptom control. Please review with symptoms.    Dr. Hernandez  ----- Message -----  From: Danilo Hernandez MD  Sent: 12/27/2024   3:59 PM CST  To: Danilo Hernandez MD

## 2025-01-10 ENCOUNTER — HOSPITAL ENCOUNTER (EMERGENCY)
Facility: HOSPITAL | Age: 40
Discharge: HOME | End: 2025-01-10
Attending: EMERGENCY MEDICINE
Payer: OTHER GOVERNMENT

## 2025-01-10 VITALS
HEIGHT: 63 IN | TEMPERATURE: 98 F | DIASTOLIC BLOOD PRESSURE: 68 MMHG | WEIGHT: 170 LBS | SYSTOLIC BLOOD PRESSURE: 128 MMHG | RESPIRATION RATE: 19 BRPM | OXYGEN SATURATION: 100 % | HEART RATE: 94 BPM | BODY MASS INDEX: 30.12 KG/M2

## 2025-01-10 DIAGNOSIS — R10.31 RIGHT LOWER QUADRANT ABDOMINAL PAIN: ICD-10-CM

## 2025-01-10 DIAGNOSIS — N83.291 OTHER OVARIAN CYST, RIGHT SIDE: ICD-10-CM

## 2025-01-10 DIAGNOSIS — K52.9 GASTROENTERITIS: ICD-10-CM

## 2025-01-10 DIAGNOSIS — R10.11 RIGHT UPPER QUADRANT ABDOMINAL PAIN: Primary | ICD-10-CM

## 2025-01-10 LAB
ALBUMIN SERPL BCP-MCNC: 4.2 G/DL (ref 3.5–5.2)
ALP SERPL-CCNC: 62 U/L (ref 40–150)
ALT SERPL W/O P-5'-P-CCNC: 22 U/L (ref 10–44)
ANION GAP SERPL CALC-SCNC: 14 MMOL/L (ref 8–16)
AST SERPL-CCNC: 20 U/L (ref 10–40)
B-HCG UR QL: NEGATIVE
BASOPHILS # BLD AUTO: 0.03 K/UL (ref 0–0.2)
BASOPHILS NFR BLD: 0.3 % (ref 0–1.9)
BILIRUB SERPL-MCNC: 0.4 MG/DL (ref 0.1–1)
BILIRUB UR QL STRIP: NEGATIVE
BUN SERPL-MCNC: 14 MG/DL (ref 6–20)
CALCIUM SERPL-MCNC: 9.3 MG/DL (ref 8.7–10.5)
CHLORIDE SERPL-SCNC: 101 MMOL/L (ref 95–110)
CLARITY UR: CLEAR
CO2 SERPL-SCNC: 23 MMOL/L (ref 23–29)
COLOR UR: YELLOW
CREAT SERPL-MCNC: 0.8 MG/DL (ref 0.5–1.4)
DIFFERENTIAL METHOD BLD: ABNORMAL
EOSINOPHIL # BLD AUTO: 0.1 K/UL (ref 0–0.5)
EOSINOPHIL NFR BLD: 0.5 % (ref 0–8)
ERYTHROCYTE [DISTWIDTH] IN BLOOD BY AUTOMATED COUNT: 13 % (ref 11.5–14.5)
EST. GFR  (NO RACE VARIABLE): >60 ML/MIN/1.73 M^2
GLUCOSE SERPL-MCNC: 86 MG/DL (ref 70–110)
GLUCOSE UR QL STRIP: NEGATIVE
HCT VFR BLD AUTO: 39.8 % (ref 37–48.5)
HGB BLD-MCNC: 13 G/DL (ref 12–16)
HGB UR QL STRIP: NEGATIVE
IMM GRANULOCYTES # BLD AUTO: 0.07 K/UL (ref 0–0.04)
IMM GRANULOCYTES NFR BLD AUTO: 0.7 % (ref 0–0.5)
KETONES UR QL STRIP: NEGATIVE
LEUKOCYTE ESTERASE UR QL STRIP: NEGATIVE
LIPASE SERPL-CCNC: 16 U/L (ref 4–60)
LYMPHOCYTES # BLD AUTO: 2.4 K/UL (ref 1–4.8)
LYMPHOCYTES NFR BLD: 24.8 % (ref 18–48)
MCH RBC QN AUTO: 29.3 PG (ref 27–31)
MCHC RBC AUTO-ENTMCNC: 32.7 G/DL (ref 32–36)
MCV RBC AUTO: 90 FL (ref 82–98)
MONOCYTES # BLD AUTO: 0.7 K/UL (ref 0.3–1)
MONOCYTES NFR BLD: 7.5 % (ref 4–15)
NEUTROPHILS # BLD AUTO: 6.4 K/UL (ref 1.8–7.7)
NEUTROPHILS NFR BLD: 66.2 % (ref 38–73)
NITRITE UR QL STRIP: NEGATIVE
NRBC BLD-RTO: 0 /100 WBC
PH UR STRIP: 7 [PH] (ref 5–8)
PLATELET # BLD AUTO: 299 K/UL (ref 150–450)
PMV BLD AUTO: 9.8 FL (ref 9.2–12.9)
POTASSIUM SERPL-SCNC: 4.1 MMOL/L (ref 3.5–5.1)
PROT SERPL-MCNC: 7.1 G/DL (ref 6–8.4)
PROT UR QL STRIP: NEGATIVE
RBC # BLD AUTO: 4.43 M/UL (ref 4–5.4)
SODIUM SERPL-SCNC: 138 MMOL/L (ref 136–145)
SP GR UR STRIP: 1.02 (ref 1–1.03)
URN SPEC COLLECT METH UR: NORMAL
UROBILINOGEN UR STRIP-ACNC: NEGATIVE EU/DL
WBC # BLD AUTO: 9.69 K/UL (ref 3.9–12.7)

## 2025-01-10 PROCEDURE — 99285 EMERGENCY DEPT VISIT HI MDM: CPT | Mod: 25

## 2025-01-10 PROCEDURE — 96372 THER/PROPH/DIAG INJ SC/IM: CPT | Performed by: NURSE PRACTITIONER

## 2025-01-10 PROCEDURE — 81025 URINE PREGNANCY TEST: CPT | Performed by: NURSE PRACTITIONER

## 2025-01-10 PROCEDURE — 80053 COMPREHEN METABOLIC PANEL: CPT | Performed by: NURSE PRACTITIONER

## 2025-01-10 PROCEDURE — 63600175 PHARM REV CODE 636 W HCPCS: Performed by: NURSE PRACTITIONER

## 2025-01-10 PROCEDURE — 83690 ASSAY OF LIPASE: CPT | Performed by: NURSE PRACTITIONER

## 2025-01-10 PROCEDURE — 25000003 PHARM REV CODE 250: Performed by: NURSE PRACTITIONER

## 2025-01-10 PROCEDURE — 74177 CT ABD & PELVIS W/CONTRAST: CPT | Mod: TC

## 2025-01-10 PROCEDURE — 85025 COMPLETE CBC W/AUTO DIFF WBC: CPT | Performed by: NURSE PRACTITIONER

## 2025-01-10 PROCEDURE — 96375 TX/PRO/DX INJ NEW DRUG ADDON: CPT

## 2025-01-10 PROCEDURE — 96374 THER/PROPH/DIAG INJ IV PUSH: CPT

## 2025-01-10 PROCEDURE — 74177 CT ABD & PELVIS W/CONTRAST: CPT | Mod: 26,,, | Performed by: RADIOLOGY

## 2025-01-10 PROCEDURE — 81003 URINALYSIS AUTO W/O SCOPE: CPT | Performed by: NURSE PRACTITIONER

## 2025-01-10 PROCEDURE — 25500020 PHARM REV CODE 255: Performed by: EMERGENCY MEDICINE

## 2025-01-10 RX ORDER — HYDROCODONE BITARTRATE AND ACETAMINOPHEN 5; 325 MG/1; MG/1
1 TABLET ORAL EVERY 8 HOURS PRN
Qty: 12 TABLET | Refills: 0 | Status: SHIPPED | OUTPATIENT
Start: 2025-01-10

## 2025-01-10 RX ORDER — DICYCLOMINE HYDROCHLORIDE 10 MG/ML
20 INJECTION INTRAMUSCULAR
Status: COMPLETED | OUTPATIENT
Start: 2025-01-10 | End: 2025-01-10

## 2025-01-10 RX ORDER — ALUMINUM HYDROXIDE, MAGNESIUM HYDROXIDE, AND SIMETHICONE 1200; 120; 1200 MG/30ML; MG/30ML; MG/30ML
30 SUSPENSION ORAL ONCE
Status: COMPLETED | OUTPATIENT
Start: 2025-01-10 | End: 2025-01-10

## 2025-01-10 RX ORDER — HYDROCODONE BITARTRATE AND ACETAMINOPHEN 10; 325 MG/1; MG/1
1 TABLET ORAL
Status: COMPLETED | OUTPATIENT
Start: 2025-01-10 | End: 2025-01-10

## 2025-01-10 RX ORDER — ONDANSETRON HYDROCHLORIDE 2 MG/ML
4 INJECTION, SOLUTION INTRAVENOUS
Status: COMPLETED | OUTPATIENT
Start: 2025-01-10 | End: 2025-01-10

## 2025-01-10 RX ORDER — LIDOCAINE HYDROCHLORIDE 20 MG/ML
15 SOLUTION OROPHARYNGEAL ONCE
Status: COMPLETED | OUTPATIENT
Start: 2025-01-10 | End: 2025-01-10

## 2025-01-10 RX ORDER — MORPHINE SULFATE 2 MG/ML
4 INJECTION, SOLUTION INTRAMUSCULAR; INTRAVENOUS
Status: COMPLETED | OUTPATIENT
Start: 2025-01-10 | End: 2025-01-10

## 2025-01-10 RX ADMIN — HYDROCODONE BITARTRATE AND ACETAMINOPHEN 1 TABLET: 10; 325 TABLET ORAL at 07:01

## 2025-01-10 RX ADMIN — DICYCLOMINE HYDROCHLORIDE 20 MG: 20 INJECTION, SOLUTION INTRAMUSCULAR at 06:01

## 2025-01-10 RX ADMIN — LIDOCAINE HYDROCHLORIDE 15 ML: 20 SOLUTION ORAL at 06:01

## 2025-01-10 RX ADMIN — IOHEXOL 75 ML: 350 INJECTION, SOLUTION INTRAVENOUS at 06:01

## 2025-01-10 RX ADMIN — ONDANSETRON 4 MG: 2 INJECTION INTRAMUSCULAR; INTRAVENOUS at 05:01

## 2025-01-10 RX ADMIN — ALUMINUM HYDROXIDE, MAGNESIUM HYDROXIDE, AND SIMETHICONE 30 ML: 200; 200; 20 SUSPENSION ORAL at 06:01

## 2025-01-10 RX ADMIN — MORPHINE SULFATE 4 MG: 2 INJECTION, SOLUTION INTRAMUSCULAR; INTRAVENOUS at 05:01

## 2025-01-10 NOTE — Clinical Note
"Luisa Malinmichael Jesus was seen and treated in our emergency department on 1/10/2025.  She may return to work on 01/13/2025.       If you have any questions or concerns, please don't hesitate to call.      Quintin Palma, NP"

## 2025-01-10 NOTE — ED PROVIDER NOTES
CHIEF COMPLAINT  Chief Complaint   Patient presents with    Abdominal Pain     Complains pain in her RUQ area that feels pressure.  Patient states pain began last night.         HISTORY OF PRESENT ILLNESS  Luisa Jesus is a 40 y.o. female with PMH as below who presents to ER for evaluation of 1 days of right side abdominal pain. It is sharp, pressure, denies dysuria, constipation.  She has history of rheumatoid disorder, taking steroid daily, taking omeprazole for IBS, acid reflux. Pain/feeling full with eating foods, patient lying on bed with discomfort. Morphine given for pain, helped for short term. No other specific aggravating or relieving factors otherwise.      PAST MEDICAL HISTORY  Past Medical History:   Diagnosis Date    Allergy     Atrial fibrillation 10/01/2023    aproxx    IBS (irritable bowel syndrome)     SVT (supraventricular tachycardia)     Thrush 01/22/2015    recurrent       CURRENT MEDICATIONS    No current facility-administered medications for this encounter.    Current Outpatient Medications:     aspirin 81 MG Chew, , Disp: , Rfl:     betamethasone dipropionate (DIPROLENE) 0.05 % ointment, Apply topically., Disp: , Rfl:     bran/gum/fib/steven/psyl/kelp/pec (FIBER 6 ORAL), , Disp: , Rfl:     CHOLECALCIFEROL, VITAMIN D3, (VITAMIN D3 ORAL), Take by mouth., Disp: , Rfl:     diltiaZEM (CARDIZEM CD) 240 MG 24 hr capsule, Take 1 capsule (240 mg total) by mouth once daily., Disp: 90 capsule, Rfl: 3    DULoxetine (CYMBALTA) 60 MG capsule, Take 1 capsule (60 mg total) by mouth once daily., Disp: 30 capsule, Rfl: 11    FLONASE ALLERGY RELIEF 50 mcg/actuation nasal spray, , Disp: , Rfl:     GI cocktail antac/dicyc/lidoc, aluminum-magnesium hydroxide-simethicone 200-200-20 mg/5 mL suspension 30 mL($4.17/day) twice a day as needed, Disp: 200 mL, Rfl: 0    HYDROcodone-acetaminophen (NORCO) 5-325 mg per tablet, Take 1 tablet by mouth every 8 (eight) hours as needed for Pain., Disp: 12 tablet, Rfl: 0     magnesium oxide 200 mg magnesium Chew, Take by mouth., Disp: , Rfl:     meloxicam (MOBIC) 15 MG tablet, Take 1 tablet (15 mg total) by mouth once daily., Disp: 30 tablet, Rfl: 0    multivit with calcium,iron,min (MULTIVITAMIN-CALCIUM AND IRON ORAL), , Disp: , Rfl:     omeprazole (PRILOSEC) 40 MG capsule, See Instructions, TAKE 1 CAPSULE BY MOUTH DAILY, # 90 cap, 0 Refill(s), Maintenance, Pharmacy: [id number], 159, cm, 09/10/24 15:40:00 CDT, Height/Length Measured, 79.6, kg, 09/10/24 15:40:00 CDT, Weight Dosing, Disp: , Rfl:     turmeric root extract 500 mg Cap, , Disp: , Rfl:     ALLERGIES    Review of patient's allergies indicates:   Allergen Reactions    Diclofenac sodium Other (See Comments)     Other reaction(s): Bloody stool    Ortho tri-cyclen (21) Hives    Sulfa (sulfonamide antibiotics) Hives and Rash    Scopolamine     Sulfur Hives    Bactrim [sulfamethoxazole-trimethoprim] Rash       SURGICAL HISTORY    Past Surgical History:   Procedure Laterality Date    CHOLECYSTECTOMY      Dx Laparoscopy      presumed endometriosis    EPIDURAL STEROID INJECTION INTO LUMBAR SPINE N/A 11/15/2024    Procedure: Injection-steroid-epidural-lumbar;  Surgeon: Konstantin Linder MD;  Location: [facility];  Service: Pain Management;  Laterality: N/A;    SINUS SURGERY  01/01/2007    SPINE SURGERY      C spine    TONSILLECTOMY      VENTRICULAR ABLATION SURGERY      WISDOM TOOTH EXTRACTION         SOCIAL HISTORY    Social History     Socioeconomic History    Marital status:     Number of children: 3   Occupational History    Occupation: back in school for nutrition   Tobacco Use    Smoking status: Never    Smokeless tobacco: Never   Substance and Sexual Activity    Alcohol use: Yes     Comment: rarely    Drug use: No    Sexual activity: Yes     Partners: Male     Birth control/protection: None     Social Drivers of Health     Financial Resource Strain: Medium Risk (10/30/2024)    Overall Financial  "Resource Strain (CARDIA)     Difficulty of Paying Living Expenses: Somewhat hard   Food Insecurity: Food Insecurity Present (10/30/2024)    Hunger Vital Sign     Worried About Running Out of Food in the Last Year: Sometimes true     Ran Out of Food in the Last Year: Never true   Physical Activity: Sufficiently Active (10/30/2024)    Exercise Vital Sign     Days of Exercise per Week: 6 days     Minutes of Exercise per Session: 40 min   Stress: Stress Concern Present (10/30/2024)    [employer] - Occupational Stress Questionnaire     Feeling of Stress : To some extent   Housing Stability: High Risk (10/30/2024)    Housing Stability Vital Sign     Unable to Pay for Housing in the Last Year: Yes       FAMILY HISTORY    Family History   Adopted: Yes   Problem Relation Name Age of Onset    Heart disease Maternal Grandmother      Eclampsia Neg Hx      Colon cancer Neg Hx         REVIEW OF SYSTEMS    Review of Systems   Gastrointestinal:  Positive for abdominal pain.     All other systems reviewed and are negative    VITAL SIGNS:   /68   Pulse 94   Temp 97.9 °F (36.6 °C) (Oral)   Resp 19   Ht 5' 3" (1.6 m)   Wt 77.1 kg (170 lb)   LMP 12/26/2024   SpO2 100%   BMI 30.11 kg/m²      Physical Exam  Constitutional:       Appearance: Normal appearance.   HENT:      Head: Normocephalic.   Cardiovascular:      Rate and Rhythm: Normal rate.   Pulmonary:      Effort: Pulmonary effort is normal. No respiratory distress.      Breath sounds: Normal breath sounds.   Abdominal:      Palpations: Abdomen is soft.      Tenderness: There is abdominal tenderness in the right upper quadrant and right lower quadrant.   Musculoskeletal:         General: Normal range of motion.   Skin:     General: Skin is warm.      Capillary Refill: Capillary refill takes less than 2 seconds.   Neurological:      General: No focal deficit present.      Mental Status: She is alert.      GCS: GCS eye subscore is 4. GCS " verbal subscore is 5. GCS motor subscore is 6.   Psychiatric:         Attention and Perception: Attention normal.         Mood and Affect: Mood normal.         Speech: Speech normal.       Vitals and nursing note reviewed.     LABS    Labs Reviewed   CBC W/ AUTO DIFFERENTIAL - Abnormal       Result Value    WBC 9.69      RBC 4.43      Hemoglobin 13.0      Hematocrit 39.8      MCV 90      MCH 29.3      MCHC 32.7      RDW 13.0      Platelets 299      MPV 9.8      Immature Granulocytes 0.7 (*)     Gran # (ANC) 6.4      Immature Grans (Abs) 0.07 (*)     Lymph # 2.4      Mono # 0.7      Eos # 0.1      Baso # 0.03      nRBC 0      Gran % 66.2      Lymph % 24.8      Mono % 7.5      Eosinophil % 0.5      Basophil % 0.3      Differential Method Automated     URINALYSIS, REFLEX TO URINE CULTURE    Specimen UA Urine, Unspecified      Color, UA Yellow      Appearance, UA Clear      pH, UA 7.0      Specific Gravity, UA 1.020      Protein, UA Negative      Glucose, UA Negative      Ketones, UA Negative      Bilirubin (UA) Negative      Occult Blood UA Negative      Nitrite, UA Negative      Urobilinogen, UA Negative      Leukocytes, UA Negative      Narrative:     Preferred Collection Type->Urine, Clean Catch                  Specimen Source->Urine   PREGNANCY TEST, URINE RAPID    Preg Test, Ur Negative      Narrative:     Specimen Source->Urine   LIPASE    Lipase 16     COMPREHENSIVE METABOLIC PANEL    Sodium 138      Potassium 4.1      Chloride 101      CO2 23      Glucose 86      BUN 14      Creatinine 0.8      Calcium 9.3      Total Protein 7.1      Albumin 4.2      Total Bilirubin 0.4      Alkaline Phosphatase 62      AST 20      ALT 22      eGFR >60.0      Anion Gap 14           EKG        RADIOLOGY    CT Abdomen Pelvis With IV Contrast NO Oral Contrast   Final Result      3.9 cm right adnexal cyst.  No significant free fluid in the pelvis.      Cholecystectomy.  Minimal intra and extrahepatic biliary ductal dilatation,  most likely relating to post cholecystectomy status.         Electronically signed by: Nino Oliver MD   Date:    01/10/2025   Time:    18:20            PROCEDURES    Procedures    Medications   morphine injection 4 mg (4 mg Intravenous Given 1/10/25 1714)   ondansetron injection 4 mg (4 mg Intravenous Given 1/10/25 1716)   iohexoL (OMNIPAQUE 350) injection 75 mL (75 mLs Intravenous Given 1/10/25 1808)   aluminum-magnesium hydroxide-simethicone 200-200-20 mg/5 mL suspension 30 mL (30 mLs Oral Given 1/10/25 1840)     And   LIDOcaine viscous HCl 2% oral solution 15 mL (15 mLs Oral Given 1/10/25 1840)   dicyclomine injection 20 mg (20 mg Intramuscular Given 1/10/25 1842)   HYDROcodone-acetaminophen  mg per tablet 1 tablet (1 tablet Oral Given 1/10/25 1943)                Medical Decision Making  Luisa Jesus is a 40 y.o. female with PMH as below who presents to ER for evaluation of 1 days of right side abdominal pain. It is sharp, pressure, denies dysuria, constipation.  She has history of rheumatoid disorder, taking steroid daily, taking omeprazole for IBS, acid reflux. Pain/feeling full with eating foods, patient lying on bed with discomfort. Morphine given for pain, helped for short term. No other specific aggravating or relieving factors otherwise.    Differential Diagnosis includes but is not limited to: appendicitis , diverticular disease, intestinal obstruction, vascular abnormality, cholecystitis, incarcerated hernia, UTI,/Pyelonephritis, pancreatitis, perforated bowel/ulcer, obstruction, PUD, GERD. Differentials I have considered and consider less likely: ischemic colitis/mesenteric ischemia, aorta pathology     Ct did not show appendicitis , she has 3.9 cm right adnexal cyst.  No significant free fluid in the pelvis.  Pain controlled in ER  Patient was advised to follow up with gastroenterologist for IBS, gastritis   After taking into careful account the historical factors and physical exam  findings of the patient's presentation today, in conjunction with imaging studies, no acute emergent medical condition requiring admission has been identified.   Patient was discharged to home with supportive care   She was advised to see her GYN    Problems Addressed:  Gastroenteritis: chronic illness or injury with exacerbation, progression, or side effects of treatment  Right lower quadrant abdominal pain: acute illness or injury  Right upper quadrant abdominal pain: acute illness or injury    Amount and/or Complexity of Data Reviewed  Labs: ordered. Decision-making details documented in ED Course.     Details: Labs unremarkable   Radiology: ordered. Decision-making details documented in ED Course.    Risk  OTC drugs.  Prescription drug management.           Discharge Medication List as of 1/10/2025  7:31 PM          Discharge Medication List as of 1/10/2025  7:31 PM        START taking these medications    Details   GI cocktail antac/dicyc/lidoc aluminum-magnesium hydroxide-simethicone 200-200-20 mg/5 mL suspension 30 mL($4.17/day) twice a day as needed, Print             I discussed with patient and/or family/caretaker that evaluation in the ED does not suggest any emergent or life threatening medical condition requiring immediate intervention beyond what was provided in the ED, and I believe the patient is safe for discharge.  Regardless, an unremarkable evaluation in the ED does not preclude the development or presence of a serious or life threatening condition. As such, patient was instructed to return immediately for any worsening or change in current symptoms  Patient agrees with plan of care.    DISPOSITION  Patient discharged to home in stable condition.        FINAL IMPRESSION    1. Right upper quadrant abdominal pain    2. Right lower quadrant abdominal pain    3. Gastroenteritis    4. Other ovarian cyst, right side         Quintin Palma, CORDELL  01/10/25 2070

## 2025-01-11 NOTE — ED NOTES
D/c to home. Rx provided with instructions for use. Verbalized to try and switch to ibuprofen from meloxicam for several days to see if helps with pain and use heating pads. Instructed to return for any new or worsening symptoms. Referral provided for GI and paperwork and information provided. Voices understanding of treatment plan. Escorted to lobby to complete d/c process with registration.

## 2025-01-13 ENCOUNTER — TELEPHONE (OUTPATIENT)
Dept: CARDIOLOGY | Facility: CLINIC | Age: 40
End: 2025-01-13
Payer: OTHER GOVERNMENT

## 2025-01-13 NOTE — TELEPHONE ENCOUNTER
----- Message from Zia Estevez MD sent at 1/13/2025  7:26 AM CST -----  This is a new pt that is on my schedule as established. Also her problem list says POTS and SVT. She had an ablation for SVT about 9 years ago at another institution, with recent monitor not showing any arrhythmias. Can we get this pt to see Shailesh or someone that manages POTS? I don't think she will get a lot out of seeing me.    GP

## 2025-01-14 ENCOUNTER — OFFICE VISIT (OUTPATIENT)
Dept: PAIN MEDICINE | Facility: CLINIC | Age: 40
End: 2025-01-14
Payer: OTHER GOVERNMENT

## 2025-01-14 ENCOUNTER — TELEPHONE (OUTPATIENT)
Dept: PAIN MEDICINE | Facility: CLINIC | Age: 40
End: 2025-01-14

## 2025-01-14 VITALS
DIASTOLIC BLOOD PRESSURE: 80 MMHG | HEIGHT: 63 IN | WEIGHT: 170 LBS | BODY MASS INDEX: 30.12 KG/M2 | RESPIRATION RATE: 15 BRPM | HEART RATE: 97 BPM | SYSTOLIC BLOOD PRESSURE: 122 MMHG

## 2025-01-14 DIAGNOSIS — M47.812 CERVICAL SPONDYLOSIS WITHOUT MYELOPATHY: Primary | ICD-10-CM

## 2025-01-14 DIAGNOSIS — M54.81 BILATERAL OCCIPITAL NEURALGIA: ICD-10-CM

## 2025-01-14 PROCEDURE — 99214 OFFICE O/P EST MOD 30 MIN: CPT | Mod: S$PBB,ICN,, | Performed by: STUDENT IN AN ORGANIZED HEALTH CARE EDUCATION/TRAINING PROGRAM

## 2025-01-14 PROCEDURE — 99999 PR PBB SHADOW E&M-EST. PATIENT-LVL IV: CPT | Mod: PBBFAC,,, | Performed by: STUDENT IN AN ORGANIZED HEALTH CARE EDUCATION/TRAINING PROGRAM

## 2025-01-14 PROCEDURE — 99214 OFFICE O/P EST MOD 30 MIN: CPT | Mod: PBBFAC,PN | Performed by: STUDENT IN AN ORGANIZED HEALTH CARE EDUCATION/TRAINING PROGRAM

## 2025-01-14 RX ORDER — SARILUMAB 200 MG/1.14ML
200 INJECTION, SOLUTION SUBCUTANEOUS
COMMUNITY
End: 2025-01-28

## 2025-01-14 NOTE — PROGRESS NOTES
[facility]    Referred by:   No ref. provider found    PCP:   No, Primary Doctor    CHIEF COMPLAINT:   Upper neck pain    HISTORY OF PRESENT ILLNESS:   Luisa Jesus presents for follow up of her chronic upper neck/occipital region pains.  She was last seen in the beginning of December at which time she was referred to physical therapy.  She reports attending PT faithfully but has not noticed much improvement in her symptoms.  She reports that the PT we will transition to focusing on her lower back pain in the near future.  The only new description of her neck and occipital pain is the intermittent sensation of a catching or popping near the base of her skull followed by zapping pain briefly in the region of the occiput.  Otherwise, no new symptoms.  Of note, she did recently start a new biologic drug from Rheumatology, Kevzara.    PAIN SCORES:  Vitals:    01/14/25 1428   PainSc:   5   PainLoc: Generalized  Comment: R sided       Therapy:  About to start therapy for the lower back   Not in therapy for the neck    Pertinent Medications:  Tried gabapentin in the past which did not help and also made her feel uneasy   Cymbalta 60 daily - endorses modest benefit  Aspirin 81  Multiple courses of corticosteroids in last 8 weeks  All other medications reviewed in the patient's chart.     Pain Intervention History:  L4-5 interlaminar JANETH-1 week relief of leg symptoms, did not help back symptoms    Review of patient's allergies indicates:   Allergen Reactions    Diclofenac sodium Other (See Comments)     Other reaction(s): Bloody stool    Ortho tri-cyclen (21) Hives    Sulfa (sulfonamide antibiotics) Hives and Rash    Scopolamine     Sulfur Hives    Bactrim [sulfamethoxazole-trimethoprim] Rash    Tocilizumab Rash     Past Medical History:   Diagnosis Date    Allergy     Atrial fibrillation 10/01/2023    aproxx    IBS (irritable bowel syndrome)     SVT (supraventricular tachycardia)     Thrush  "01/22/2015    recurrent     Patient Active Problem List   Diagnosis    Paroxysmal SVT (supraventricular tachycardia)    Hx of prior ablation treatment, for PSVT 2015    IBS (irritable bowel syndrome)    Imbalance    Chronic thoracic spine pain    History of syncope, onset 2011    Fatigue    Thrush    POTS (postural orthostatic tachycardia syndrome)    ALEXIS positive    Bilateral wrist pain    Cardiac conduction disorder    Diarrhea    Dysautonomia    GERD (gastroesophageal reflux disease)    Increased frequency of urination    Low serum vitamin B12    Palpitations    Stress at work    MODI (dyspnea on exertion)    Chronic vertigo    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    Elevated CPK    Family history of premature coronary heart disease    Fibrocystic breast changes    Hematochezia    High risk medication use    History of falling    Undifferentiated connective tissue disease    Varicose veins of bilateral lower extremities with other complications    Neck pain    Chronic bilateral low back pain without sciatica     Past Surgical History:   Procedure Laterality Date    CHOLECYSTECTOMY      Dx Laparoscopy      presumed endometriosis    EPIDURAL STEROID INJECTION INTO LUMBAR SPINE N/A 11/15/2024    Procedure: Injection-steroid-epidural-lumbar;  Surgeon: Konstantin Linder MD;  Location: [facility] OR;  Service: Pain Management;  Laterality: N/A;    SINUS SURGERY  01/01/2007    SPINE SURGERY      C spine    TONSILLECTOMY      VENTRICULAR ABLATION SURGERY      WISDOM TOOTH EXTRACTION       Social History     Tobacco Use    Smoking status: Never    Smokeless tobacco: Never   Substance Use Topics    Alcohol use: Yes     Comment: rarely    Drug use: No        Family history reviewed in the patient's chart.     PHYSICAL EXAMINATION  VITALS:   Vitals:    01/14/25 1428   BP: 122/80   Pulse: 97   Resp: 15   Weight: 77.1 kg (169 lb 15.6 oz)   Height: 5' 3" (1.6 m)   PainSc:   5   PainLoc: " Generalized     GENERAL: Calm, cooperative, pleasant  CHEST: No increased work of breathing  SKIN: Intact    MSK/NEURO:  Cervical spine range of motion is full with associated upper cervical pain on terminal extension and crepitus on lateral bending   Tender to palpation in the upper cervical paraspinals and lower occipital region  Strength is full throughout bilateral lower extremities   Sensation is intact to light touch throughout bilateral upper extremities   Reflexes are intact and symmetric in the upper and lower extremities  No Rocha's no clonus    LABS:  CBC and CMP within normal limits last week    IMAGING:    Cervical spine MRI with and without contrast 2024   Personal review open (no report available):  Normal alignment of the spine.  There appears to be anterior fixation hardware between C5-6.  There is significant degenerative disc disease at C4-5 without herniation.  There is degenerative disc disease associated with a broad posterior disc protrusion below the fusion construct at C6-7.  This narrows the central canal but does not cause significant stenosis.  No apparent cord signal change.  Mild multilevel cervical spondylosis more prominent in the lower levels.    ASSESSMENT:   40 y.o. year old female with suspected symptomatic spondylosis of the upper cervical spine causing occipital neuralgia.  Also with lower back pain and radicular features which did not respond to interlaminar steroid injection.    Encounter Diagnoses   Name Primary?    Cervical spondylosis without myelopathy Yes    Bilateral occipital neuralgia        PLAN:  Schedule bilateral TON, C3, and C4 blocks.  I consider performing a greater occipital nerve block under ultrasound in the clinic, but I do not wish to expose her to any more corticosteroids and do not think there is much utility in performing a local only block of that particular nerve.  Reviewed available PT notes  RTC TBD by response to the above      Konstantin ROY  MD Kailash  This note was completed with dictation software and grammatical/syntax errors may exist.

## 2025-01-14 NOTE — TELEPHONE ENCOUNTER
Types of orders made on 01/14/2025: Medications, Procedure Request      Order Date:1/14/2025   Ordering User:KONSTANTIN QUIROGA [[id number]]   Encounter Provider:Konstantin Quiroga MD [[id number]]   Authorizing Provider: oKnstantin Quiroga MD [[id number]]   Department:[facility] PAIN MANAGEMENT[[id number]]      Common Order Information   Procedure -> Medial Branch Block (Specify level and laterality) Cmt: TON, C3,             C4 bilateral      Order Specific Information   Order: Procedure Order to Pain Management [Custom: [id number]]  Order #:          [id number]: 1 FUTURE     Priority: Routine  Class: Clinic Performed     Future Order Information       Expires on:01/14/2026            Expected by:01/14/2025                   Associated Diagnoses       M47.812 Cervical spondylosis without myelopathy       M54.81 Bilateral occipital neuralgia       Physician -> daja          Is patient on anti-coagulants? -> No          Facility Name: -> [facility]              Priority: Routine  Class: Clinic Performed     Future Order Information       Expires on:01/14/2026            Expected by:01/14/2025                   Associated Diagnoses       M47.812 Cervical spondylosis without myelopathy       M54.81 Bilateral occipital neuralgia       Procedure -> Medial Branch Block (Specify level and laterality) Cmt: TON,                 C3, C4 bilateral          Physician -> daja          Is patient on anti-coagulants? -> No

## 2025-01-15 ENCOUNTER — NURSE TRIAGE (OUTPATIENT)
Dept: ADMINISTRATIVE | Facility: CLINIC | Age: 40
End: 2025-01-15
Payer: OTHER GOVERNMENT

## 2025-01-15 ENCOUNTER — HOSPITAL ENCOUNTER (EMERGENCY)
Facility: HOSPITAL | Age: 40
Discharge: HOME | End: 2025-01-15
Attending: STUDENT IN AN ORGANIZED HEALTH CARE EDUCATION/TRAINING PROGRAM
Payer: OTHER GOVERNMENT

## 2025-01-15 ENCOUNTER — CLINICAL SUPPORT (OUTPATIENT)
Dept: REHABILITATION | Facility: HOSPITAL | Age: 40
End: 2025-01-15
Payer: OTHER GOVERNMENT

## 2025-01-15 ENCOUNTER — PATIENT MESSAGE (OUTPATIENT)
Dept: NEUROLOGY | Facility: CLINIC | Age: 40
End: 2025-01-15
Payer: OTHER GOVERNMENT

## 2025-01-15 VITALS
BODY MASS INDEX: 30.12 KG/M2 | HEART RATE: 72 BPM | SYSTOLIC BLOOD PRESSURE: 125 MMHG | WEIGHT: 170 LBS | RESPIRATION RATE: 18 BRPM | HEIGHT: 63 IN | OXYGEN SATURATION: 100 % | DIASTOLIC BLOOD PRESSURE: 78 MMHG | TEMPERATURE: 98 F

## 2025-01-15 DIAGNOSIS — G89.29 CHRONIC BILATERAL LOW BACK PAIN WITHOUT SCIATICA: ICD-10-CM

## 2025-01-15 DIAGNOSIS — R20.0 RIGHT FACIAL NUMBNESS: ICD-10-CM

## 2025-01-15 DIAGNOSIS — M79.2 NEUROPATHIC PAIN: Primary | ICD-10-CM

## 2025-01-15 DIAGNOSIS — R29.818 ACUTE FOCAL NEUROLOGICAL DEFICIT: Primary | ICD-10-CM

## 2025-01-15 DIAGNOSIS — M54.2 NECK PAIN: Primary | ICD-10-CM

## 2025-01-15 DIAGNOSIS — R20.2 PARESTHESIA OF BOTH LOWER EXTREMITIES: ICD-10-CM

## 2025-01-15 DIAGNOSIS — M54.50 CHRONIC BILATERAL LOW BACK PAIN WITHOUT SCIATICA: ICD-10-CM

## 2025-01-15 DIAGNOSIS — R20.0 NUMBNESS: ICD-10-CM

## 2025-01-15 LAB
ALBUMIN SERPL BCP-MCNC: 3.9 G/DL (ref 3.5–5.2)
ALP SERPL-CCNC: 86 U/L (ref 40–150)
ALT SERPL W/O P-5'-P-CCNC: 41 U/L (ref 10–44)
ANION GAP SERPL CALC-SCNC: 11 MMOL/L (ref 8–16)
AST SERPL-CCNC: 26 U/L (ref 10–40)
BASOPHILS # BLD AUTO: 0.01 K/UL (ref 0–0.2)
BASOPHILS NFR BLD: 0.3 % (ref 0–1.9)
BILIRUB SERPL-MCNC: 0.3 MG/DL (ref 0.1–1)
BUN SERPL-MCNC: 11 MG/DL (ref 6–20)
CALCIUM SERPL-MCNC: 9 MG/DL (ref 8.7–10.5)
CHLORIDE SERPL-SCNC: 106 MMOL/L (ref 95–110)
CHOLEST SERPL-MCNC: 236 MG/DL (ref 120–199)
CHOLEST/HDLC SERPL: 2.7 {RATIO} (ref 2–5)
CO2 SERPL-SCNC: 21 MMOL/L (ref 23–29)
CREAT SERPL-MCNC: 0.8 MG/DL (ref 0.5–1.4)
DIFFERENTIAL METHOD BLD: ABNORMAL
EOSINOPHIL # BLD AUTO: 0.1 K/UL (ref 0–0.5)
EOSINOPHIL NFR BLD: 2.6 % (ref 0–8)
ERYTHROCYTE [DISTWIDTH] IN BLOOD BY AUTOMATED COUNT: 12.8 % (ref 11.5–14.5)
EST. GFR  (NO RACE VARIABLE): >60 ML/MIN/1.73 M^2
GLUCOSE SERPL-MCNC: 98 MG/DL (ref 70–110)
HCT VFR BLD AUTO: 41.5 % (ref 37–48.5)
HDLC SERPL-MCNC: 89 MG/DL (ref 40–75)
HDLC SERPL: 37.7 % (ref 20–50)
HGB BLD-MCNC: 13.6 G/DL (ref 12–16)
IMM GRANULOCYTES # BLD AUTO: 0 K/UL (ref 0–0.04)
IMM GRANULOCYTES NFR BLD AUTO: 0 % (ref 0–0.5)
INR PPP: 1 (ref 0.8–1.2)
LDLC SERPL CALC-MCNC: 131.4 MG/DL (ref 63–159)
LYMPHOCYTES # BLD AUTO: 1.7 K/UL (ref 1–4.8)
LYMPHOCYTES NFR BLD: 48.4 % (ref 18–48)
MCH RBC QN AUTO: 30 PG (ref 27–31)
MCHC RBC AUTO-ENTMCNC: 32.8 G/DL (ref 32–36)
MCV RBC AUTO: 91 FL (ref 82–98)
MONOCYTES # BLD AUTO: 0.4 K/UL (ref 0.3–1)
MONOCYTES NFR BLD: 10.7 % (ref 4–15)
NEUTROPHILS # BLD AUTO: 1.3 K/UL (ref 1.8–7.7)
NEUTROPHILS NFR BLD: 38 % (ref 38–73)
NONHDLC SERPL-MCNC: 147 MG/DL
NRBC BLD-RTO: 0 /100 WBC
PLATELET # BLD AUTO: 240 K/UL (ref 150–450)
PMV BLD AUTO: 9.4 FL (ref 9.2–12.9)
POCT GLUCOSE: 100 MG/DL (ref 70–110)
POTASSIUM SERPL-SCNC: 4.1 MMOL/L (ref 3.5–5.1)
PROT SERPL-MCNC: 6.8 G/DL (ref 6–8.4)
PROTHROMBIN TIME: 10.7 SEC (ref 9–12.5)
RBC # BLD AUTO: 4.54 M/UL (ref 4–5.4)
SODIUM SERPL-SCNC: 138 MMOL/L (ref 136–145)
TRIGL SERPL-MCNC: 78 MG/DL (ref 30–150)
TSH SERPL DL<=0.005 MIU/L-ACNC: 1.2 UIU/ML (ref 0.4–4)
WBC # BLD AUTO: 3.45 K/UL (ref 3.9–12.7)

## 2025-01-15 PROCEDURE — G0426 INPT/ED TELECONSULT50: HCPCS | Mod: GT,,, | Performed by: STUDENT IN AN ORGANIZED HEALTH CARE EDUCATION/TRAINING PROGRAM

## 2025-01-15 PROCEDURE — 85025 COMPLETE CBC W/AUTO DIFF WBC: CPT | Performed by: STUDENT IN AN ORGANIZED HEALTH CARE EDUCATION/TRAINING PROGRAM

## 2025-01-15 PROCEDURE — 70498 CT ANGIOGRAPHY NECK: CPT | Mod: 26,,, | Performed by: RADIOLOGY

## 2025-01-15 PROCEDURE — 85610 PROTHROMBIN TIME: CPT | Performed by: STUDENT IN AN ORGANIZED HEALTH CARE EDUCATION/TRAINING PROGRAM

## 2025-01-15 PROCEDURE — 97140 MANUAL THERAPY 1/> REGIONS: CPT

## 2025-01-15 PROCEDURE — 82962 GLUCOSE BLOOD TEST: CPT

## 2025-01-15 PROCEDURE — 84443 ASSAY THYROID STIM HORMONE: CPT | Performed by: STUDENT IN AN ORGANIZED HEALTH CARE EDUCATION/TRAINING PROGRAM

## 2025-01-15 PROCEDURE — 99285 EMERGENCY DEPT VISIT HI MDM: CPT | Mod: 25

## 2025-01-15 PROCEDURE — 36415 COLL VENOUS BLD VENIPUNCTURE: CPT | Performed by: STUDENT IN AN ORGANIZED HEALTH CARE EDUCATION/TRAINING PROGRAM

## 2025-01-15 PROCEDURE — 80061 LIPID PANEL: CPT | Performed by: STUDENT IN AN ORGANIZED HEALTH CARE EDUCATION/TRAINING PROGRAM

## 2025-01-15 PROCEDURE — 94761 N-INVAS EAR/PLS OXIMETRY MLT: CPT

## 2025-01-15 PROCEDURE — 80053 COMPREHEN METABOLIC PANEL: CPT | Performed by: STUDENT IN AN ORGANIZED HEALTH CARE EDUCATION/TRAINING PROGRAM

## 2025-01-15 PROCEDURE — 25500020 PHARM REV CODE 255: Performed by: STUDENT IN AN ORGANIZED HEALTH CARE EDUCATION/TRAINING PROGRAM

## 2025-01-15 PROCEDURE — 70496 CT ANGIOGRAPHY HEAD: CPT | Mod: 26,,, | Performed by: RADIOLOGY

## 2025-01-15 PROCEDURE — 93010 ELECTROCARDIOGRAM REPORT: CPT | Mod: ,,, | Performed by: INTERNAL MEDICINE

## 2025-01-15 PROCEDURE — 93005 ELECTROCARDIOGRAM TRACING: CPT

## 2025-01-15 PROCEDURE — 70496 CT ANGIOGRAPHY HEAD: CPT | Mod: TC

## 2025-01-15 RX ORDER — PREDNISONE 20 MG/1
40 TABLET ORAL DAILY
Qty: 10 TABLET | Refills: 0 | Status: SHIPPED | OUTPATIENT
Start: 2025-01-15 | End: 2025-01-20

## 2025-01-15 RX ORDER — VALACYCLOVIR HYDROCHLORIDE 1 G/1
1000 TABLET, FILM COATED ORAL EVERY 8 HOURS
Qty: 21 TABLET | Refills: 0 | Status: SHIPPED | OUTPATIENT
Start: 2025-01-15 | End: 2025-01-22

## 2025-01-15 RX ADMIN — IOHEXOL 75 ML: 350 INJECTION, SOLUTION INTRAVENOUS at 03:01

## 2025-01-15 NOTE — TELEMEDICINE CONSULT
[facility]  Vascular Neurology  Comprehensive Stroke Center  TeleVascular Neurology Acute Consultation Note        Consult Information  Consult to Telemedicine - Acute Stroke  Consult performed by: Minerva Silverio MD  Consult ordered by: Braynna Rowell MD          Consulting Provider: BRYANNA ROWELL   Current Providers  No providers found    Patient Location:  [facility] EMERGENCY DEPARTMENT Emergency Department    Spoke hospital nurse at bedside with patient assisting consultant.  Patient information was obtained from patient.       Stroke Documentation  Acute Stroke Times   Last Known Normal Date: 01/15/25  Last Known Normal Time: 0600  Symptom Onset Date: 01/15/25  Symptom Onset Time: 0600  Stroke Team Called Date: 01/15/25  Stroke Team Called Time: 1521  Stroke Team Arrival Date: 01/15/25  Stroke Team Arrival Time: 1521  CT Interpretation Time: 1526  Thrombolytic Therapy Recommended: No    NIH Scale:  Interval: baseline  1a. Level of Consciousness: 0-->Alert, keenly responsive  1b. LOC Questions: 0-->Answers both questions correctly  1c. LOC Commands: 0-->Performs both tasks correctly  2. Best Gaze: 0-->Normal  3. Visual: 0-->No visual loss  4. Facial Palsy: 0-->Normal symmetrical movements  5a. Motor Arm, Left: 0-->No drift, limb holds 90 (or 45) degrees for full 10 secs  5b. Motor Arm, Right: 0-->No drift, limb holds 90 (or 45) degrees for full 10 secs  6a. Motor Leg, Left: 0-->No drift, leg holds 30 degree position for full 5 secs  6b. Motor Leg, Right: 0-->No drift, leg holds 30 degree position for full 5 secs  7. Limb Ataxia: 0-->Absent  8. Sensory: 1-->Mild-to-moderate sensory loss, patient feels pinprick is less sharp or is dull on the affected side, or there is a loss of superficial pain with pinprick, but patient is aware of being touched  9. Best Language: 0-->No aphasia, normal  10. Dysarthria: 0-->Normal  11. Extinction and Inattention (formerly Neglect): 0-->No  "abnormality  Total (NIH Stroke Scale): 1      Modified Towns: Score: 0  [place] Coma Scale:     ABCD2 Score:    [id number] Score:    HAS -BLED Score:    ICH Score:    Hunt & Crow Classification:      Blood pressure (!) 143/85, pulse 98, temperature 98 °F (36.7 °C), resp. rate 20, height 5' 3" (1.6 m), weight 77.1 kg (170 lb), last menstrual period 12/26/2024, SpO2 100%, not currently breastfeeding.      In my opinion, this was a: Tier 1; VAN Stroke Assessment: Negative     Medical Decision Making  HPI:  40 y.o. female with a PMHx significant for POTS, SVT presenting with right-sided numbness, difficulty swallowing. LKN around 0600. At PT, she had slurred speech. She reports that her right eye has a film over it, also has face numbness which is new for her. Also presenting with right arm and leg numbness, but this is chronic per patient.     /85, P 92, O2 100       Images personally reviewed and interpreted:  Study: Head CT and CTA Head & Neck  Study Interpretation: No acute intracranial hemorrhage. CTA head/neck with no LVO or high-grade stenosis.      Assessment and plan:  # Right face numbness  # Right eye blurry vision  - Ddx includes atypical migraine (though she does not have a history of this) vs demyelinating disorder. TIA/small ischemic stroke appear less likely, but not excluded.     Lytics recommendation: Thrombolytic therapy not recommended due to Outside of treatment window   Thrombectomy recommendation: No; No large vessel occlusion identified on imaging   Placement recommendation: pending further studies     - Recommend follow-up non-urgent MRI brain with and without contrast to evaluate for acute ischemia or other intracranial abnormality. If negative for acute findings, recommend close follow-up with her outpatient neurologist.   - If above studies are abnormal, please load images to [employer] imaging system and contact us to review.    Please contact us with any further questions " or concerns or if patient has any acute neurological changes (new symptoms, worsening deficits).          ROS  Physical Exam  Past Medical History:   Diagnosis Date    Allergy     Atrial fibrillation 10/01/2023    aproxx    IBS (irritable bowel syndrome)     SVT (supraventricular tachycardia)     Thrush 01/22/2015    recurrent     Past Surgical History:   Procedure Laterality Date    CHOLECYSTECTOMY      Dx Laparoscopy      presumed endometriosis    EPIDURAL STEROID INJECTION INTO LUMBAR SPINE N/A 11/15/2024    Procedure: Injection-steroid-epidural-lumbar;  Surgeon: Konstantin Linder MD;  Location: [facility] OR;  Service: Pain Management;  Laterality: N/A;    SINUS SURGERY  01/01/2007    SPINE SURGERY      C spine    TONSILLECTOMY      VENTRICULAR ABLATION SURGERY      WISDOM TOOTH EXTRACTION       Family History   Adopted: Yes   Problem Relation Name Age of Onset    Heart disease Maternal Grandmother      Eclampsia Neg Hx      Colon cancer Neg Hx         Diagnoses  Problem Noted   Numbness and Tingling of Right Face 1/15/2025       Minerva Silverio MD      Emergent/Acute neurological consultation requested by spoke provider due to critical concerns for possible cerebrovascular event that could result in permanent loss of neurologic/bodily function, severe disability or death of this patient.  Immediate/timely evaluation by a highly prepared expert is paramount for optimal outcomes  High risk for neurological deterioration if not properly diagnosed  High risk for neurological deterioration if not treated promplty/as soon as possible  Complex diagnostic evaluation may be required (advanced imaging)  High risk treatment options (thrombolytics and/or thrombectomy)    Patient care was coordinated with spoke provider, including but not limted to    Discussing likely diagnosis/etiology of symptoms  Making recommendations for further diagnostic studies  Making recommendations for intravenous thrombolytics or other advanced  therapies  Making recommendations for disposition (admission/transfer for higher level of care)      Neurology consultation requested by spoke provider. Audiovisual encounter with the patient performed using a secure connection.  Results and impressions from the visit are documented on this note and were communicated to the consulting provider/team via direct communication. The note has been shared for addition to the patients electronic medical record.

## 2025-01-15 NOTE — TELEPHONE ENCOUNTER
Pt called with c/o weakness to right side of body. Pt reports pain and numbness to right side of face that started yesterday but worsened today. Care advice recommends call  now. Pt verbalized understanding and stated she is at the ER in the parking lot and will go in to be seen.  Reason for Disposition   Sudden onset of weakness of the face, arm or leg on one side of the body and present now (Exception: Bell's palsy suspected: weakness on one side of the face developing over hours to days, with no other symptoms.)    Additional Information   Negative: SEVERE weakness (e.g., unable to walk or barely able to walk, requires support) and new-onset or getting worse   Negative: Difficult to awaken or acting confused (e.g., disoriented, slurred speech)    Protocols used: Neurologic Deficit-A-OH

## 2025-01-15 NOTE — SUBJECTIVE & OBJECTIVE
HPI:  40 y.o. female with a PMHx significant for POTS, SVT presenting with right-sided numbness, difficulty swallowing. LKN around 0600. At PT, she had slurred speech. She reports that her right eye has a film over it, also has face numbness which is new for her. Also presenting with right arm and leg numbness, but this is chronic per patient.     /85, P 92, O2 100       Images personally reviewed and interpreted:  Study: Head CT and CTA Head & Neck  Study Interpretation: No acute intracranial hemorrhage. CTA head/neck with no LVO or high-grade stenosis.      Assessment and plan:  # Right face numbness  # Right eye blurry vision  - Ddx includes atypical migraine (though she does not have a history of this) vs demyelinating disorder. TIA/small ischemic stroke appear less likely, but not excluded.     Lytics recommendation: Thrombolytic therapy not recommended due to Outside of treatment window   Thrombectomy recommendation: No; No large vessel occlusion identified on imaging   Placement recommendation: pending further studies     - Recommend follow-up non-urgent MRI brain with and without contrast to evaluate for acute ischemia or other intracranial abnormality. If negative for acute findings, recommend close follow-up with her outpatient neurologist.   - If above studies are abnormal, please load images to teleneurology imaging system and contact us to review.    Please contact us with any further questions or concerns or if patient has any acute neurological changes (new symptoms, worsening deficits).

## 2025-01-15 NOTE — NURSING
1455  Notified of a pt with facial numbness  Immediately went to pt.    Assessment as follows from start of day:    0730/0700  Pt woke with right facial/neck numbness. Denies any abnormal sensation elsewhere  She feels it's getting hard to swallow and move her mouth. Speech is clear and fluent with no aphasia and no dysarthria.    Facial movements equal  Face equal at rest  Tongue midnline  No droop    Perrl  Fine motor skills intact  Ambulates without issue  Equal strength BUE and BLE  Equal rom BUE and BLE    Pt states she has an autoimmune disorder which causes inflammation and abnormal sensation that she sees neuro for.  She denies hx of cva, tia, stroke, cord compression, and denies any previous symptoms like she is currently experiencing.    At end of assessment she reports:    During PT today her PT attempted to relieve neck nerve pain from her occipital region and then she had slurred speech briefly.    1500  Notified er doc and brought pt to ct    1523  Neurology at bedside  Assessment unchanged  Pt added right vision distortion/hazy as if she had her head under water with eyes open. She did not report this earlier but says it also started when she woke up.     1530  Assessment unchanged    1600  Assessment unchanged  Pt back from bathroom.    1630  Pt up for discharge.  No changes in assessment.  She feels comfortable going home stating she will follow up

## 2025-01-15 NOTE — PROGRESS NOTES
[facility]   Physical Therapy Treatment Note       Name: Luisa Sullivan [facility]  Clinic Number: [id number]    Therapy Diagnosis:   Encounter Diagnoses   Name Primary?    Neck pain Yes    Chronic bilateral low back pain without sciatica      Physician: Konstantin Linder*    Visit Date: 1/15/2025    Physician Orders: PT Eval and Treat   Medical Diagnosis:   M47.812 (ICD-10-CM) - Cervical spondylosis   M54.81 (ICD-10-CM) - Bilateral occipital neuralgia   M47.816 (ICD-10-CM) - Lumbar spondylosis   Evaluation Date: 12/5/2024  Authorization period Expiration: 12/31/2024  Plan of Care Expiration: 2/28/2025  Progress Note Due: 1/17/2025  Visit #/Visits authorized: 4/12   FOTO: 1/ 3         Precautions: Standard and chronic inflammation , wearing Holter monitor, occipital neuralgia   Date of Surgery: NA     PTA Visit #: 0/5     Time In: 1:30  Time Out: 2:10  Total Billable Time: 40 minutes  SUBJECTIVE      Pt reports: she has right sided numbness of her entire body.  She states she left a message for her neurologist, and hasn't heard back yet.  Response to previous treatment: increased pain  Functional change: none yet    Pain: 9/10  Location: right sided cervical and face     History of Current Condition: Laurie reports: 2 year history of symptoms, including global pain and achiness with recent exacerbation in neck and back. Back pain is bilateral but has more pain on the right side, including her right hip, knee and ankle. This does not seem to follow a neurological pathway, more joint pain. She reports neck pain to be worse than back at this time and would like to focus on the neck today. She states neck pain begins in suboccipital region and radiates down into the upper traps bilaterally. She states every morning she spends about an hour stretching then gets into the bath to relax her muscles before she can start her day. She has a history of tachycardia and is currently wearing a  "Holter monitor to track the arrhythmias. She has an autoimmune disorder, causing chronic inflammation and pain affecting her ability to perform her daily activities. She also reports daily headaches.    OBJECTIVE     Objective Measures updated at progress report unless specified.    Treatment   Laurie received the following manual therapy techniques for 40 minutes:   Grade II upper cervical side glides, rotational mobs  Grade II cervical posterior-anterior mobs  PROM cervical spine all planes except extension  Gentle soft tissue mobilization to bilateral cervical paravertebral muscles, bilateral suboccipitals, and bilateral upper traps  Manual stretching bilateral upper traps and levator scaps     Laurie performed the following therapeutic exercises for 0 minutes:    CROM in all planes x 5  Chin tucks x 10  Levator scapulae 5 x 5"  Head tilt stretch 5 x 5"  Scapular retractions 10 x 5"  Rowing RTB x 15  Cervical isometrics in all planes x 5      Possible next visit   Deep neck flexors supine  Scap retractions  Prone W lift off/ wall W lift off  Supine horizontal abduction, yellow thera-band      Patient Education and Home Exercises     Home Exercises Provided and Patient Education Provided     Education provided:   - discussed hydration following soft tissue work  - monitor symptoms    Written Home Exercises Provided:  not yet .       ASSESSMENT      Performed gentle manual this date with rest breaks as needed. Patient with audible cavitation while performing PROM. Did increase pressure on paravertebral muscles this date, still very gentle with suboccipitals. Patient did demonstrate an inflammatory response during treatment, including redness around the area being worked and spreading to the anterior neck and chest. Patient reporting no other adverse effects during treatment.     Paste from lalo Luisa is a 39 y.o. female referred to outpatient physical therapy with a medical diagnosis of M47.812 (ICD-10-CM)- " Cervical spondylosis, M54.81 (ICD-10-CM)- Bilateral occipital neuralgia, M47.816 (ICD-10-CM)- Lumbar spondylosis, and presents to PT with pain limiting function, weakness in deep neck flexors, lower traps, and  bilaterally and general malaise. Patient to benefit from skilled therapy to prevent further decline in functional status. Patient with good tolerance to initial treatment which focused on gentle mobility of the cervical spine and surrounding soft tissues. All questions and concerns addressed.     Pt will continue to benefit from skilled outpatient physical therapy to address the deficits listed in the problem list box on initial evaluation, provide pt/family education and to maximize pt's level of independence in the home and community environment.     Goals:   Short Term Goals: 3 weeks  Demonstrate improvement in recent symptoms to progress toward long term goals  Correct postural deviations in sitting and standing to decrease pain and promote postural awareness for injury prevention.  Demonstrate compliance with initial exercise program     Long Term Goals: 6 weeks  Improve  strength by 5# bilaterally  Perform household and work tasks with minimal limitation  Report 50-75% decrease in frequency of headaches  Improve strength in mid back muscles and deep neck flexors by 1/2 MMT grade or better  Demonstrate independence with home exercise program to maintain gains made in therapy.    PLAN   Certification Period: 12/5/2024 to 2/28/2025.     Outpatient Physical Therapy 1-2 times weekly for 6 weeks to include the following interventions: patient education, Manual Therapy, Moist Heat/ Ice, Neuromuscular Re-ed, Patient Education, Self Care, Therapeutic Activities, Therapeutic Exercise, and Ultrasound.   Pt may be seen by PTA as part of the rehabilitation team.     Continue to progress per plan of care. Advance as tolerated     Elsa Burgess, PT

## 2025-01-15 NOTE — DISCHARGE INSTRUCTIONS
Follow up with a neurologist for outpatient MRI if persistent symptoms to rule out acute ischemic stroke.    Take prednisone, valacyclovir as prescribed for possible new early onset Bell's palsy

## 2025-01-15 NOTE — ED NOTES
1451 arrived to ED  1502 Paged stroke activation overhead  1503 patient to CT  1504 called transfer center (had to place call center on hold for 5 minutes while going to CT to obtain all the necessary information)   1513 Activation time. Dr. Silverio neurology states call back to transfer center when patient in room

## 2025-01-15 NOTE — ED PROVIDER NOTES
Encounter Date: 1/15/2025       History     Chief Complaint   Patient presents with    Numbness     40-year-old female with significant history of AFib, irritable bowel disease, supraventricular tachycardic, lupus, rheumatoid arthritis, chronic right-sided weakness.  She presents to ED with chief complaints of right-sided facial, and right neck numbness and tenderness with onset at 6:00 a.m. she also went to saw her physical therapist who did occipital release, encouraged her to presents to ED for further evaluation.  She denies associated vision changes, dysarthria, new upper or lower extremity weakness.   Has been in the woods lately, no known tick bites.         The history is provided by the patient. No  was used.     Review of patient's allergies indicates:   Allergen Reactions    Diclofenac sodium Other (See Comments)     Other reaction(s): Bloody stool    Ortho tri-cyclen (21) Hives    Sulfa (sulfonamide antibiotics) Hives and Rash    Scopolamine     Sulfur Hives    Bactrim [sulfamethoxazole-trimethoprim] Rash    Tocilizumab Rash     Past Medical History:   Diagnosis Date    Allergy     Atrial fibrillation 10/01/2023    aproxx    IBS (irritable bowel syndrome)     SVT (supraventricular tachycardia)     Thrush 01/22/2015    recurrent     Past Surgical History:   Procedure Laterality Date    CHOLECYSTECTOMY      Dx Laparoscopy      presumed endometriosis    EPIDURAL STEROID INJECTION INTO LUMBAR SPINE N/A 11/15/2024    Procedure: Injection-steroid-epidural-lumbar;  Surgeon: Konstantin Linder MD;  Location: [facility] OR;  Service: Pain Management;  Laterality: N/A;    SINUS SURGERY  01/01/2007    SPINE SURGERY      C spine    TONSILLECTOMY      VENTRICULAR ABLATION SURGERY      WISDOM TOOTH EXTRACTION       Family History   Adopted: Yes   Problem Relation Name Age of Onset    Heart disease Maternal Grandmother      Eclampsia Neg Hx      Colon cancer Neg Hx       Social History      Tobacco Use    Smoking status: Never    Smokeless tobacco: Never   Substance Use Topics    Alcohol use: Yes     Comment: rarely    Drug use: No     Review of Systems   Constitutional: Negative.    HENT: Negative.     Eyes: Negative.    Respiratory: Negative.     Cardiovascular: Negative.    Gastrointestinal: Negative.    Endocrine: Negative.    Genitourinary: Negative.    Musculoskeletal: Negative.    Skin: Negative.    Allergic/Immunologic: Negative.    Neurological:  Positive for numbness.   Hematological: Negative.    Psychiatric/Behavioral: Negative.     All other systems reviewed and are negative.      Physical Exam     Initial Vitals [01/15/25 1525]   BP Pulse Resp Temp SpO2   (!) 143/85 98 20 98 °F (36.7 °C) 100 %      MAP       --         Physical Exam    Nursing note and vitals reviewed.  Constitutional: She appears well-developed.   HENT:   Head: Normocephalic.   Eyes: Pupils are equal, round, and reactive to light.   Neck: No JVD present.   Normal range of motion.  Cardiovascular:  Normal rate, regular rhythm and normal heart sounds.           Pulmonary/Chest: Breath sounds normal. No respiratory distress.   Abdominal: Abdomen is soft. Bowel sounds are normal.   Musculoskeletal:         General: Normal range of motion.      Cervical back: Normal range of motion.     Neurological: She is alert and oriented to person, place, and time. She has normal strength. GCS score is 15. GCS eye subscore is 4. GCS verbal subscore is 5. GCS motor subscore is 6.   Numbness to right upper and lower face, the right lateral neck.   Skin: Skin is warm. Capillary refill takes less than 2 seconds.   Psychiatric: She has a normal mood and affect.         ED Course   Procedures  Labs Reviewed   CBC W/ AUTO DIFFERENTIAL - Abnormal       Result Value    WBC 3.45 (*)     RBC 4.54      Hemoglobin 13.6      Hematocrit 41.5      MCV 91      MCH 30.0      MCHC 32.8      RDW 12.8      Platelets 240      MPV 9.4      Immature  Granulocytes 0.0      Gran # (ANC) 1.3 (*)     Immature Grans (Abs) 0.00      Lymph # 1.7      Mono # 0.4      Eos # 0.1      Baso # 0.01      nRBC 0      Gran % 38.0      Lymph % 48.4 (*)     Mono % 10.7      Eosinophil % 2.6      Basophil % 0.3      Differential Method Automated     COMPREHENSIVE METABOLIC PANEL - Abnormal    Sodium 138      Potassium 4.1      Chloride 106      CO2 21 (*)     Glucose 98      BUN 11      Creatinine 0.8      Calcium 9.0      Total Protein 6.8      Albumin 3.9      Total Bilirubin 0.3      Alkaline Phosphatase 86      AST 26      ALT 41      eGFR >60.0      Anion Gap 11     LIPID PANEL - Abnormal    Cholesterol 236 (*)     Triglycerides 78      HDL 89 (*)     LDL Cholesterol 131.4      HDL/Cholesterol Ratio 37.7      Total Cholesterol/HDL Ratio 2.7      Non-HDL Cholesterol 147     PROTIME-INR    Prothrombin Time 10.7      INR 1.0     TSH   POCT GLUCOSE    POCT Glucose 100     POCT GLUCOSE MONITORING CONTINUOUS          Imaging Results              CTA Head and Neck (xpd) (Final result)  Result time 01/15/25 15:45:40      Final result by Eliot Pedro MD (01/15/25 15:45:40)                   Impression:      No acute abnormality. No high-grade stenosis or major vessel occlusion.      Electronically signed by: Eliot Pedro  Date:    01/15/2025  Time:    15:45               Narrative:    EXAMINATION:  CTA HEAD AND NECK (XPD)    CLINICAL HISTORY:  Neuro deficit, acute, stroke suspected;    TECHNIQUE:  Non contrast low dose axial images were obtained through the head. CT angiogram was performed from the level of the chi to the top of the head following the IV administration of 75mL of Omnipaque 350.   Sagittal and coronal reconstructions and maximum intensity projection reconstructions were performed. Arterial stenosis percentages are based on NASCET measurement criteria.    COMPARISON:  None    FINDINGS:  Intracranial Compartment:    Ventricles and sulci are normal in size for  age without evidence of hydrocephalus. No extra-axial blood or fluid collections.    The brain parenchyma appears normal. No parenchymal mass, hemorrhage, edema, or major vascular distribution infarct.    Skull/Extracranial Contents (limited evaluation): No fracture. Mastoid air cells and paranasal sinuses are essentially clear.    Non-Vascular Structures of the Neck/Thoracic Inlet (limited evaluation): Normal.    Aorta: Normal 3 vessel arch.  Ectasia of the ascending thoracic aorta measuring up to 3.8 cm.    Extracranial carotid circulation: No hemodynamically significant stenosis, aneurysmal dilatation, or dissection.    Extracranial vertebral circulation: No hemodynamically significant stenosis, aneurysmal dilatation, or dissection.    Intracranial Arteries: No focal high-grade stenosis, occlusion, or aneurysm.  Fetal origin of the right PCA.    Venous structures (limited evaluation): Normal.                                       Medications   iohexoL (OMNIPAQUE 350) injection 75 mL (75 mLs Intravenous Given 1/15/25 5272)     Medical Decision Making    40-year-old female with right facial numbness.   Ddx stroke, Bell's palsy, others  Physical exam is benign on exam.    NIH stroke scale 1  Screening labs unremarkable  CT head CTA head, neck unremarkable.  Tele neuro evaluated the patient recommendations include follow up with outpatient MRI  She does take aspirin daily advised to continue aspirin daily, until seen by her neurologist  Bell's palsy in differential, symptoms not typical Bell's palsy symptoms nevertheless prescribed prednisone, valacyclovir out of abundance of caution  Very strict return precautions discussed she verbalized understanding.       Amount and/or Complexity of Data Reviewed  Labs: ordered.  Radiology: ordered.    Risk  Prescription drug management.                                      Clinical Impression:  Final diagnoses:  [R29.818] Acute focal neurological deficit (Primary)  [R20.0]  Right facial numbness  [R20.0] Numbness                 Dell Crocker MD  01/15/25 1621       Dell Crocker MD  01/15/25 1628

## 2025-01-16 LAB
OHS QRS DURATION: 74 MS
OHS QTC CALCULATION: 477 MS

## 2025-01-16 NOTE — TELEPHONE ENCOUNTER
Staff spoke to patient, informing her Dr. Bustamante did put a referral in for her to see a nervomuscular provider.

## 2025-01-23 ENCOUNTER — PATIENT MESSAGE (OUTPATIENT)
Dept: NEUROLOGY | Facility: CLINIC | Age: 40
End: 2025-01-23
Payer: OTHER GOVERNMENT

## 2025-01-23 NOTE — H&P (VIEW-ONLY)
Subjective:       Patient ID: Luisa Jesus is a 40 y.o. female Body mass index is 32.41 kg/m².    Chief Complaint: Abdominal Pain    This patient is new to me.     RUQ pain - sharp, entire body locked up - eased after an hour and she went to sleep. Returned the next day.   + cholecystectomy  + nausea - likely due to biologic that was stopped    EGD last done at [facility] - ? 2023    1/10/25 [facility] ER Visit    Abdominal Pain       Complains pain in her RUQ area that feels pressure.  Patient states pain began last night.          HISTORY OF PRESENT ILLNESS  Luisa Jesus is a 40 y.o. female with PMH as below who presents to ER for evaluation of 1 days of right side abdominal pain. It is sharp, pressure, denies dysuria, constipation.  She has history of rheumatoid disorder, taking steroid daily, taking omeprazole for IBS, acid reflux. Pain/feeling full with eating foods, patient lying on bed with discomfort. Morphine given for pain, helped for short term. No other specific aggravating or relieving factors otherwise.    CT A/P  CT Abdomen Pelvis With IV Contrast NO Oral Contrast  Final Result     3.9 cm right adnexal cyst.  No significant free fluid in the pelvis.     Cholecystectomy.  Minimal intra and extrahepatic biliary ductal dilatation, most likely relating to post cholecystectomy status.    Medical Decision Making  Luisa Jesus is a 40 y.o. female with PMH as below who presents to ER for evaluation of 1 days of right side abdominal pain. It is sharp, pressure, denies dysuria, constipation.  She has history of rheumatoid disorder, taking steroid daily, taking omeprazole for IBS, acid reflux. Pain/feeling full with eating foods, patient lying on bed with discomfort. Morphine given for pain, helped for short term. No other specific aggravating or relieving factors otherwise.     Differential Diagnosis includes but is not limited to: appendicitis , diverticular disease, intestinal  obstruction, vascular abnormality, cholecystitis, incarcerated hernia, UTI,/Pyelonephritis, pancreatitis, perforated bowel/ulcer, obstruction, PUD, GERD. Differentials I have considered and consider less likely: ischemic colitis/mesenteric ischemia, aorta pathology      Ct did not show appendicitis , she has 3.9 cm right adnexal cyst.  No significant free fluid in the pelvis.  Pain controlled in ER  Patient was advised to follow up with gastroenterologist for IBS, gastritis   After taking into careful account the historical factors and physical exam findings of the patient's presentation today, in conjunction with imaging studies, no acute emergent medical condition requiring admission has been identified.   Patient was discharged to home with supportive care   She was advised to see her GYN     Problems Addressed:  Gastroenteritis: chronic illness or injury with exacerbation, progression, or side effects of treatment  Right lower quadrant abdominal pain: acute illness or injury  Right upper quadrant abdominal pain: acute illness or injury     Amount and/or Complexity of Data Reviewed  Labs: ordered. Decision-making details documented in ED Course.     Details: Labs unremarkable   Radiology: ordered. Decision-making details documented in ED Course.     Risk  OTC drugs.  Prescription drug management.        Review of Systems   Constitutional:  Positive for activity change and fatigue. Negative for appetite change, fever and unexpected weight change.   HENT:  Negative for sore throat and trouble swallowing.    Respiratory:  Negative for cough and shortness of breath.    Cardiovascular:  Negative for chest pain.   Gastrointestinal:  Positive for nausea. Negative for abdominal distention, abdominal pain, anal bleeding, blood in stool, constipation, diarrhea, rectal pain and vomiting.       Patient's last menstrual period was 12/26/2024.  Past Medical History:   Diagnosis Date    Allergy     Atrial fibrillation  10/01/2023    aproxx    IBS (irritable bowel syndrome)     SVT (supraventricular tachycardia)     Thrush 01/22/2015    recurrent     Past Surgical History:   Procedure Laterality Date    CHOLECYSTECTOMY      Dx Laparoscopy      presumed endometriosis    EPIDURAL STEROID INJECTION INTO LUMBAR SPINE N/A 11/15/2024    Procedure: Injection-steroid-epidural-lumbar;  Surgeon: Konstantin Linder MD;  Location: [facility];  Service: Pain Management;  Laterality: N/A;    SINUS SURGERY  01/01/2007    SPINE SURGERY      C spine    TONSILLECTOMY      VENTRICULAR ABLATION SURGERY      WISDOM TOOTH EXTRACTION       Family History   Adopted: Yes   Problem Relation Name Age of Onset    Heart disease Maternal Grandmother      Eclampsia Neg Hx      Colon cancer Neg Hx       Social History     Tobacco Use    Smoking status: Never    Smokeless tobacco: Never   Substance Use Topics    Alcohol use: Yes     Comment: rarely    Drug use: No     Wt Readings from Last 10 Encounters:   01/24/25 83 kg (182 lb 15.7 oz)   01/15/25 77.1 kg (170 lb)   01/14/25 77.1 kg (169 lb 15.6 oz)   01/10/25 77.1 kg (170 lb)   12/04/24 79 kg (174 lb 2.6 oz)   12/03/24 79.8 kg (176 lb)   12/02/24 79.9 kg (176 lb 2.4 oz)   11/25/24 79.9 kg (176 lb 3.2 oz)   11/12/24 78.9 kg (173 lb 15.1 oz)   11/04/24 78.9 kg (173 lb 15.1 oz)     Lab Results   Component Value Date    WBC 3.45 (L) 01/15/2025    HGB 13.6 01/15/2025    HCT 41.5 01/15/2025    MCV 91 01/15/2025     01/15/2025     CMP  Sodium   Date Value Ref Range Status   01/15/2025 138 136 - 145 mmol/L Final     Potassium   Date Value Ref Range Status   01/15/2025 4.1 3.5 - 5.1 mmol/L Final     Chloride   Date Value Ref Range Status   01/15/2025 106 95 - 110 mmol/L Final     CO2   Date Value Ref Range Status   01/15/2025 21 (L) 23 - 29 mmol/L Final     Glucose   Date Value Ref Range Status   01/15/2025 98 70 - 110 mg/dL Final     BUN   Date Value Ref Range Status   01/15/2025 11 6 - 20 mg/dL Final  "    Creatinine   Date Value Ref Range Status   01/15/2025 0.8 0.5 - 1.4 mg/dL Final     Calcium   Date Value Ref Range Status   01/15/2025 9.0 8.7 - 10.5 mg/dL Final     Total Protein   Date Value Ref Range Status   01/15/2025 6.8 6.0 - 8.4 g/dL Final     Albumin   Date Value Ref Range Status   01/15/2025 3.9 3.5 - 5.2 g/dL Final     Total Bilirubin   Date Value Ref Range Status   01/15/2025 0.3 0.1 - 1.0 mg/dL Final     Comment:     For infants and newborns, interpretation of results should be based  on gestational age, weight and in agreement with clinical  observations.    Premature Infant recommended reference ranges:  Up to 24 hours.............<8.0 mg/dL  Up to 48 hours............<12.0 mg/dL  3-5 days..................<15.0 mg/dL  6-29 days.................<15.0 mg/dL       Alkaline Phosphatase   Date Value Ref Range Status   01/15/2025 86 40 - 150 U/L Final     AST   Date Value Ref Range Status   01/15/2025 26 10 - 40 U/L Final     ALT   Date Value Ref Range Status   01/15/2025 41 10 - 44 U/L Final     Anion Gap   Date Value Ref Range Status   01/15/2025 11 8 - 16 mmol/L Final     eGFR if    Date Value Ref Range Status   08/04/2014 >60.0 >60 mL/min/1.73 m^2 Final     eGFR if non    Date Value Ref Range Status   08/04/2014 >60.0 >60 mL/min/1.73 m^2 Final     Comment:     Calculation used to obtain the estimated glomerular filtration  rate (eGFR) is the CKD-EPI equation. Since race is unknown   in our information system, the eGFR values for   -American and Non--American patients are given   for each creatinine result.       No results found for: "AMYLASE"  Lab Results   Component Value Date    LIPASE 16 01/10/2025     No results found for: "LIPASERES"  Lab Results   Component Value Date    TSH 1.203 01/15/2025       Reviewed prior medical records including radiology report of CT a/p 1/2025 and ER visit at [facility] 1/2025 & endoscopy history (see surgical " history).    Objective:      Physical Exam  Vitals and nursing note reviewed.   Constitutional:       General: She is not in acute distress.     Appearance: She is not ill-appearing.   HENT:      Head: Normocephalic and atraumatic.      Mouth/Throat:      Mouth: Mucous membranes are moist.      Pharynx: Oropharynx is clear.   Eyes:      Conjunctiva/sclera: Conjunctivae normal.   Cardiovascular:      Rate and Rhythm: Normal rate and regular rhythm.      Pulses: Normal pulses.   Pulmonary:      Effort: Pulmonary effort is normal. No respiratory distress.   Abdominal:      General: Abdomen is flat. Bowel sounds are normal. There is no distension.      Palpations: Abdomen is soft.      Tenderness: There is abdominal tenderness. There is guarding.   Skin:     General: Skin is warm and dry.      Capillary Refill: Capillary refill takes 2 to 3 seconds.   Neurological:      Mental Status: She is alert and oriented to person, place, and time.         Assessment:       1. Epigastric pain    2. Right upper quadrant abdominal pain    3. Right lower quadrant abdominal pain    4. Gastroenteritis        Plan:       Epigastric pain, Right upper quadrant abdominal pain, Right lower quadrant abdominal pain  -discussed about the different types of medications used to treat reflux and how to use them, antacids can be used PRN for breakthrough heartburn symptoms by reducing stomach acid that is already produced, H2 blockers work by limiting the amount acid production, & PPI's work to block acid production and are taken daily, patient verbalized understanding.  -Educated patient on lifestyle modifications to help control/reduce reflux/abdominal pain including: avoid large meals, avoid eating within 2-3 hours of bedtime (avoid late night eating & lying down soon after eating), elevate head of bed if nocturnal symptoms are present, smoking cessation (if current smoker), & weight loss (if overweight).   -Educated to avoid known foods which  trigger reflux symptoms & to minimize/avoid high-fat foods, chocolate, caffeine, citrus, alcohol, & tomato products.  -Advised to avoid/limit use of NSAID's, since they can cause GI upset, bleeding, and/or ulcers. If needed, take with food.     - schedule EGD, discussed procedure with patient, including risks and benefits, patient verbalized understanding    -     omeprazole (PRILOSEC) 40 MG capsule; Take 1 capsule (40 mg total) by mouth every morning.  Dispense: 90 capsule; Refill: 3  -     famotidine (PEPCID) 40 MG tablet; Take 1 tablet (40 mg total) by mouth nightly as needed for Heartburn.  Dispense: 30 tablet; Refill: 11  -     GI cocktail antac/dicyc/lidoc; 30 ml  4 times a day  AC/HS PRN abdominal pain and burning  Dispense: 200 mL; Refill: 0  -     Case Request Endoscopy: EGD (ESOPHAGOGASTRODUODENOSCOPY)    Gastroenteritis  - Avoid foods and drinks that cause discomfort for you. For many people these include alcohol, coffee, caffeinated soda, fatty foods, chocolate, and spicy foods.  - Avoid eating late night snacks.  - If you smoke or chew tobacco, try to quit. Tobacco will slow the healing of your ulcer and increase the chance that the ulcer will come back. Talk to your doctor about getting help for quitting tobacco use.  - Try to reduce your stress level and learn ways to better manage stress.  Avoid drugs such as aspirin, ibuprofen (Advil, Motrin), or naproxen (Aleve, Naprosyn). Take acetaminophen (Tylenol) to relieve pain. Take all medicines with plenty of water.    - schedule EGD, discussed procedure with patient, including risks and benefits, patient verbalized understanding    -     Ambulatory referral/consult to Gastroenterology  -     omeprazole (PRILOSEC) 40 MG capsule; Take 1 capsule (40 mg total) by mouth every morning.  Dispense: 90 capsule; Refill: 3  -     famotidine (PEPCID) 40 MG tablet; Take 1 tablet (40 mg total) by mouth nightly as needed for Heartburn.  Dispense: 30 tablet; Refill:  11  -     GI cocktail antac/dicyc/lidoc; 30 ml  4 times a day  AC/HS PRN abdominal pain and burning  Dispense: 200 mL; Refill: 0  -     Case Request Endoscopy: EGD (ESOPHAGOGASTRODUODENOSCOPY)      Follow up if symptoms worsen or fail to improve.      If no improvement in symptoms or symptoms worsen, call/follow-up at clinic or go to ER.       WILBERT Aburto, COLBY-C    Encounter includes face to face time and non-face to face time preparing to see the patient (eg, review of tests), obtaining and/or reviewing separately obtained history, documenting clinical information in the electronic or other health record, independently interpreting results (not separately reported) and communicating results to the patient/family/caregiver, or care coordination (not separately reported).     A dictation software program was used for this note. Please expect some simple typographical errors in this note.

## 2025-01-23 NOTE — PROGRESS NOTES
Subjective:       Patient ID: Luisa Jesus is a 40 y.o. female Body mass index is 32.41 kg/m².    Chief Complaint: Abdominal Pain    This patient is new to me.     RUQ pain - sharp, entire body locked up - eased after an hour and she went to sleep. Returned the next day.   + cholecystectomy  + nausea - likely due to biologic that was stopped    EGD last done at [facility] - ? 2023    1/10/25 [place] ER Visit    Abdominal Pain       Complains pain in her RUQ area that feels pressure.  Patient states pain began last night.          HISTORY OF PRESENT ILLNESS  Luisa Jesus is a 40 y.o. female with PMH as below who presents to ER for evaluation of 1 days of right side abdominal pain. It is sharp, pressure, denies dysuria, constipation.  She has history of rheumatoid disorder, taking steroid daily, taking omeprazole for IBS, acid reflux. Pain/feeling full with eating foods, patient lying on bed with discomfort. Morphine given for pain, helped for short term. No other specific aggravating or relieving factors otherwise.    CT A/P  CT Abdomen Pelvis With IV Contrast NO Oral Contrast  Final Result     3.9 cm right adnexal cyst.  No significant free fluid in the pelvis.     Cholecystectomy.  Minimal intra and extrahepatic biliary ductal dilatation, most likely relating to post cholecystectomy status.    Medical Decision Making  Luisa Jesus is a 40 y.o. female with PMH as below who presents to ER for evaluation of 1 days of right side abdominal pain. It is sharp, pressure, denies dysuria, constipation.  She has history of rheumatoid disorder, taking steroid daily, taking omeprazole for IBS, acid reflux. Pain/feeling full with eating foods, patient lying on bed with discomfort. Morphine given for pain, helped for short term. No other specific aggravating or relieving factors otherwise.     Differential Diagnosis includes but is not limited to: appendicitis , diverticular disease, intestinal  obstruction, vascular abnormality, cholecystitis, incarcerated hernia, UTI,/Pyelonephritis, pancreatitis, perforated bowel/ulcer, obstruction, PUD, GERD. Differentials I have considered and consider less likely: ischemic colitis/mesenteric ischemia, aorta pathology      Ct did not show appendicitis , she has 3.9 cm right adnexal cyst.  No significant free fluid in the pelvis.  Pain controlled in ER  Patient was advised to follow up with gastroenterologist for IBS, gastritis   After taking into careful account the historical factors and physical exam findings of the patient's presentation today, in conjunction with imaging studies, no acute emergent medical condition requiring admission has been identified.   Patient was discharged to home with supportive care   She was advised to see her GYN     Problems Addressed:  Gastroenteritis: chronic illness or injury with exacerbation, progression, or side effects of treatment  Right lower quadrant abdominal pain: acute illness or injury  Right upper quadrant abdominal pain: acute illness or injury     Amount and/or Complexity of Data Reviewed  Labs: ordered. Decision-making details documented in ED Course.     Details: Labs unremarkable   Radiology: ordered. Decision-making details documented in ED Course.     Risk  OTC drugs.  Prescription drug management.        Review of Systems   Constitutional:  Positive for activity change and fatigue. Negative for appetite change, fever and unexpected weight change.   HENT:  Negative for sore throat and trouble swallowing.    Respiratory:  Negative for cough and shortness of breath.    Cardiovascular:  Negative for chest pain.   Gastrointestinal:  Positive for nausea. Negative for abdominal distention, abdominal pain, anal bleeding, blood in stool, constipation, diarrhea, rectal pain and vomiting.       Patient's last menstrual period was 12/26/2024.  Past Medical History:   Diagnosis Date    Allergy     Atrial fibrillation  10/01/2023    aproxx    IBS (irritable bowel syndrome)     SVT (supraventricular tachycardia)     Thrush 01/22/2015    recurrent     Past Surgical History:   Procedure Laterality Date    CHOLECYSTECTOMY      Dx Laparoscopy      presumed endometriosis    EPIDURAL STEROID INJECTION INTO LUMBAR SPINE N/A 11/15/2024    Procedure: Injection-steroid-epidural-lumbar;  Surgeon: Konstantin Linder MD;  Location: [facility] OR;  Service: Pain Management;  Laterality: N/A;    SINUS SURGERY  01/01/2007    SPINE SURGERY      C spine    TONSILLECTOMY      VENTRICULAR ABLATION SURGERY      WISDOM TOOTH EXTRACTION       Family History   Adopted: Yes   Problem Relation Name Age of Onset    Heart disease Maternal Grandmother      Eclampsia Neg Hx      Colon cancer Neg Hx       Social History     Tobacco Use    Smoking status: Never    Smokeless tobacco: Never   Substance Use Topics    Alcohol use: Yes     Comment: rarely    Drug use: No     Wt Readings from Last 10 Encounters:   01/24/25 83 kg (182 lb 15.7 oz)   01/15/25 77.1 kg (170 lb)   01/14/25 77.1 kg (169 lb 15.6 oz)   01/10/25 77.1 kg (170 lb)   12/04/24 79 kg (174 lb 2.6 oz)   12/03/24 79.8 kg (176 lb)   12/02/24 79.9 kg (176 lb 2.4 oz)   11/25/24 79.9 kg (176 lb 3.2 oz)   11/12/24 78.9 kg (173 lb 15.1 oz)   11/04/24 78.9 kg (173 lb 15.1 oz)     Lab Results   Component Value Date    WBC 3.45 (L) 01/15/2025    HGB 13.6 01/15/2025    HCT 41.5 01/15/2025    MCV 91 01/15/2025     01/15/2025     CMP  Sodium   Date Value Ref Range Status   01/15/2025 138 136 - 145 mmol/L Final     Potassium   Date Value Ref Range Status   01/15/2025 4.1 3.5 - 5.1 mmol/L Final     Chloride   Date Value Ref Range Status   01/15/2025 106 95 - 110 mmol/L Final     CO2   Date Value Ref Range Status   01/15/2025 21 (L) 23 - 29 mmol/L Final     Glucose   Date Value Ref Range Status   01/15/2025 98 70 - 110 mg/dL Final     BUN   Date Value Ref Range Status   01/15/2025 11 6 - 20 mg/dL Final  "    Creatinine   Date Value Ref Range Status   01/15/2025 0.8 0.5 - 1.4 mg/dL Final     Calcium   Date Value Ref Range Status   01/15/2025 9.0 8.7 - 10.5 mg/dL Final     Total Protein   Date Value Ref Range Status   01/15/2025 6.8 6.0 - 8.4 g/dL Final     Albumin   Date Value Ref Range Status   01/15/2025 3.9 3.5 - 5.2 g/dL Final     Total Bilirubin   Date Value Ref Range Status   01/15/2025 0.3 0.1 - 1.0 mg/dL Final     Comment:     For infants and newborns, interpretation of results should be based  on gestational age, weight and in agreement with clinical  observations.    Premature Infant recommended reference ranges:  Up to 24 hours.............<8.0 mg/dL  Up to 48 hours............<12.0 mg/dL  3-5 days..................<15.0 mg/dL  6-29 days.................<15.0 mg/dL       Alkaline Phosphatase   Date Value Ref Range Status   01/15/2025 86 40 - 150 U/L Final     AST   Date Value Ref Range Status   01/15/2025 26 10 - 40 U/L Final     ALT   Date Value Ref Range Status   01/15/2025 41 10 - 44 U/L Final     Anion Gap   Date Value Ref Range Status   01/15/2025 11 8 - 16 mmol/L Final     eGFR if    Date Value Ref Range Status   08/04/2014 >60.0 >60 mL/min/1.73 m^2 Final     eGFR if non    Date Value Ref Range Status   08/04/2014 >60.0 >60 mL/min/1.73 m^2 Final     Comment:     Calculation used to obtain the estimated glomerular filtration  rate (eGFR) is the CKD-EPI equation. Since race is unknown   in our information system, the eGFR values for   -American and Non--American patients are given   for each creatinine result.       No results found for: "AMYLASE"  Lab Results   Component Value Date    LIPASE 16 01/10/2025     No results found for: "LIPASERES"  Lab Results   Component Value Date    TSH 1.203 01/15/2025       Reviewed prior medical records including radiology report of CT a/p 1/2025 and ER visit at [facility] 1/2025 & endoscopy history (see surgical " history).    Objective:      Physical Exam  Vitals and nursing note reviewed.   Constitutional:       General: She is not in acute distress.     Appearance: She is not ill-appearing.   HENT:      Head: Normocephalic and atraumatic.      Mouth/Throat:      Mouth: Mucous membranes are moist.      Pharynx: Oropharynx is clear.   Eyes:      Conjunctiva/sclera: Conjunctivae normal.   Cardiovascular:      Rate and Rhythm: Normal rate and regular rhythm.      Pulses: Normal pulses.   Pulmonary:      Effort: Pulmonary effort is normal. No respiratory distress.   Abdominal:      General: Abdomen is flat. Bowel sounds are normal. There is no distension.      Palpations: Abdomen is soft.      Tenderness: There is abdominal tenderness. There is guarding.   Skin:     General: Skin is warm and dry.      Capillary Refill: Capillary refill takes 2 to 3 seconds.   Neurological:      Mental Status: She is alert and oriented to person, place, and time.         Assessment:       1. Epigastric pain    2. Right upper quadrant abdominal pain    3. Right lower quadrant abdominal pain    4. Gastroenteritis        Plan:       Epigastric pain, Right upper quadrant abdominal pain, Right lower quadrant abdominal pain  -discussed about the different types of medications used to treat reflux and how to use them, antacids can be used PRN for breakthrough heartburn symptoms by reducing stomach acid that is already produced, H2 blockers work by limiting the amount acid production, & PPI's work to block acid production and are taken daily, patient verbalized understanding.  -Educated patient on lifestyle modifications to help control/reduce reflux/abdominal pain including: avoid large meals, avoid eating within 2-3 hours of bedtime (avoid late night eating & lying down soon after eating), elevate head of bed if nocturnal symptoms are present, smoking cessation (if current smoker), & weight loss (if overweight).   -Educated to avoid known foods which  trigger reflux symptoms & to minimize/avoid high-fat foods, chocolate, caffeine, citrus, alcohol, & tomato products.  -Advised to avoid/limit use of NSAID's, since they can cause GI upset, bleeding, and/or ulcers. If needed, take with food.     - schedule EGD, discussed procedure with patient, including risks and benefits, patient verbalized understanding    -     omeprazole (PRILOSEC) 40 MG capsule; Take 1 capsule (40 mg total) by mouth every morning.  Dispense: 90 capsule; Refill: 3  -     famotidine (PEPCID) 40 MG tablet; Take 1 tablet (40 mg total) by mouth nightly as needed for Heartburn.  Dispense: 30 tablet; Refill: 11  -     GI cocktail antac/dicyc/lidoc; 30 ml  4 times a day  AC/HS PRN abdominal pain and burning  Dispense: 200 mL; Refill: 0  -     Case Request Endoscopy: EGD (ESOPHAGOGASTRODUODENOSCOPY)    Gastroenteritis  - Avoid foods and drinks that cause discomfort for you. For many people these include alcohol, coffee, caffeinated soda, fatty foods, chocolate, and spicy foods.  - Avoid eating late night snacks.  - If you smoke or chew tobacco, try to quit. Tobacco will slow the healing of your ulcer and increase the chance that the ulcer will come back. Talk to your doctor about getting help for quitting tobacco use.  - Try to reduce your stress level and learn ways to better manage stress.  Avoid drugs such as aspirin, ibuprofen (Advil, Motrin), or naproxen (Aleve, Naprosyn). Take acetaminophen (Tylenol) to relieve pain. Take all medicines with plenty of water.    - schedule EGD, discussed procedure with patient, including risks and benefits, patient verbalized understanding    -     Ambulatory referral/consult to Gastroenterology  -     omeprazole (PRILOSEC) 40 MG capsule; Take 1 capsule (40 mg total) by mouth every morning.  Dispense: 90 capsule; Refill: 3  -     famotidine (PEPCID) 40 MG tablet; Take 1 tablet (40 mg total) by mouth nightly as needed for Heartburn.  Dispense: 30 tablet; Refill:  11  -     GI cocktail antac/dicyc/lidoc; 30 ml  4 times a day  AC/HS PRN abdominal pain and burning  Dispense: 200 mL; Refill: 0  -     Case Request Endoscopy: EGD (ESOPHAGOGASTRODUODENOSCOPY)      Follow up if symptoms worsen or fail to improve.      If no improvement in symptoms or symptoms worsen, call/follow-up at clinic or go to ER.       WILBERT Aburto, COLBY-C    Encounter includes face to face time and non-face to face time preparing to see the patient (eg, review of tests), obtaining and/or reviewing separately obtained history, documenting clinical information in the electronic or other health record, independently interpreting results (not separately reported) and communicating results to the patient/family/caregiver, or care coordination (not separately reported).     A dictation software program was used for this note. Please expect some simple typographical errors in this note.

## 2025-01-24 ENCOUNTER — OFFICE VISIT (OUTPATIENT)
Dept: NEUROLOGY | Facility: CLINIC | Age: 40
End: 2025-01-24
Payer: OTHER GOVERNMENT

## 2025-01-24 ENCOUNTER — OFFICE VISIT (OUTPATIENT)
Dept: GASTROENTEROLOGY | Facility: CLINIC | Age: 40
End: 2025-01-24
Payer: OTHER GOVERNMENT

## 2025-01-24 VITALS — HEIGHT: 63 IN | WEIGHT: 183 LBS | BODY MASS INDEX: 32.43 KG/M2

## 2025-01-24 DIAGNOSIS — R51.9 FACIAL PAIN: ICD-10-CM

## 2025-01-24 DIAGNOSIS — M54.2 NECK PAIN: ICD-10-CM

## 2025-01-24 DIAGNOSIS — R20.0 NUMBNESS AND TINGLING OF RIGHT FACE: ICD-10-CM

## 2025-01-24 DIAGNOSIS — R10.11 RIGHT UPPER QUADRANT ABDOMINAL PAIN: ICD-10-CM

## 2025-01-24 DIAGNOSIS — R10.13 EPIGASTRIC PAIN: Primary | ICD-10-CM

## 2025-01-24 DIAGNOSIS — G44.86 CERVICOGENIC HEADACHE: Primary | ICD-10-CM

## 2025-01-24 DIAGNOSIS — R20.2 NUMBNESS AND TINGLING OF RIGHT FACE: ICD-10-CM

## 2025-01-24 DIAGNOSIS — K52.9 GASTROENTERITIS: ICD-10-CM

## 2025-01-24 DIAGNOSIS — R10.31 RIGHT LOWER QUADRANT ABDOMINAL PAIN: ICD-10-CM

## 2025-01-24 PROCEDURE — 99204 OFFICE O/P NEW MOD 45 MIN: CPT | Mod: S$PBB,ICN,,

## 2025-01-24 PROCEDURE — 99213 OFFICE O/P EST LOW 20 MIN: CPT | Mod: PBBFAC,PN

## 2025-01-24 PROCEDURE — 99999 PR PBB SHADOW E&M-EST. PATIENT-LVL III: CPT | Mod: PBBFAC,,,

## 2025-01-24 RX ORDER — OMEPRAZOLE 40 MG/1
40 CAPSULE, DELAYED RELEASE ORAL EVERY MORNING
Qty: 90 CAPSULE | Refills: 3 | Status: SHIPPED | OUTPATIENT
Start: 2025-01-24

## 2025-01-24 RX ORDER — FAMOTIDINE 40 MG/1
40 TABLET, FILM COATED ORAL NIGHTLY PRN
Qty: 30 TABLET | Refills: 11 | Status: SHIPPED | OUTPATIENT
Start: 2025-01-24 | End: 2026-01-24

## 2025-01-24 RX ORDER — METHOCARBAMOL 500 MG/1
1000 TABLET, FILM COATED ORAL NIGHTLY
Qty: 60 TABLET | Refills: 1 | Status: SHIPPED | OUTPATIENT
Start: 2025-01-24

## 2025-01-24 NOTE — PATIENT INSTRUCTIONS
Instructions for Outpatient Endoscopy     PROCEDURE DATE:      REPORT TO [facility] ([address]) ONE HOUR BEFORE PROCEDURE      NO BLOOD THINNERS/NO ASPIRIN OR MEDICATIONS CONTAINING ASPIRIN, MOTRIN, IBUPROFEN, ALEVE OR ANY ANTI-INFLAMMATORY MEDICATIONS FOR 7 DAYS PRIOR TO PROCEDURE. TYLENOL IS OK.        Eat a light evening meal the night before your Endoscopy          (Soup, Salad, Fifty Six, or grilled chicken)     Nothing by mouth after midnight or the morning of EGD.                  Ok to take morning medications with small sip water by 5:30am (except no Diabetic medications)               SINCE SEDATION IS USED, YOU MUST HAVE SOMEONE TO DRIVE YOU HOME/NO TAXI     If you have any questions or concerns, please don't hesitate to call.     Sincerely,  DONYA Moore  [phone]

## 2025-01-24 NOTE — PROGRESS NOTES
Patient ID: [id number]  The patient location is: [facility]   The chief complaint leading to consultation is: ER f/u for facial numbness and pain    Visit type: audiovisual    Face to Face time with patient: 16    Each patient to whom he or she provides medical services by telemedicine is:  (1) informed of the relationship between the physician and patient and the respective role of any other health care provider with respect to management of the patient; and (2) notified that he or she may decline to receive medical services by telemedicine and may withdraw from such care at any time.    Notes:     Subjective:     \[facility]\""  Luisa Jesus is a pleasant 40 y.o. RH female with POTS/dysautonomia, cardiac arrhythmia, IBS, fibromyalgia, and unidentified rheumatologic/connective tissue disorder. she is presenting today for recent ER follow up.    Interval history (01/24/2025):  She was just recently started Kevzara 2 weeks ago.  One day after the injection she developed numbness and pulsating pain on the right side of the face, inside the throat, occipital area and neck which prompted an ER visit.  She also feels a swelling inside her throat. The pain has been ongoing since January 15th. The pain is minimally subsided by a muscle relaxant. Tylenol and Advil were ineffective. She denies sensitivity to light or sound.  She was told by her rheumatologist to hold off on this medication for now until they discuss an alternate option.    Interval history (12/17/2024):  Her ALEXIS was repeated positive with higher titer with nuclear pattern, she is receiving this treatment for SLE.   Humira was helping skin rash a bit but not the pain.    She was switched to Actemra as of last week.  She was also seen by uro gynecology who started her on gemtesa.   Lumbar spine MRI showed multilevel DDD for which I referred her to spine surgery.  She underwent L4-L5  JANETH in mid November with no significant improvement in pain.  EMG  "only showed mild right CTS and no large fiber neuropathy.  We increased Cymbalta to 60 mg daily, it was initially helpful but then the symptoms recurred.  Did not like gabapentin at all in the past, made her extremely drowsy.     She was also referred to PT which has been focusing on the neck pain first.  Regarding headaches, she reports constant pain and pressure, in the occipital area, 4 out of 10 with occasional worsening.    Initial HPI (10/10/2024):  She reports numbness, tingling, vibration, "electric shocks" in random spots in the body that have been going on for about a year.   Reports new vertigo, tinnitus, blurred and double vision this summer. She has also incoordination and is dropping objects more often.  She has been evaluated by ENT and undergone audiology and VNG which were reportedly normal.  Eye evaluation has been reportedly normal as well.   Reports frequent stumbling and tripping over her own feet.  She reports urinary frequency and nocturia x1 year. Denies change in bowel habits. Denies incontinence with either.  She sometimes has episodes of tight sensation around the rib cage that wake her up in the middle of the night that may last 2 hours.  She has positive ALEXIS and arthralgia. Follows with a rheumatologist and receives Humira for presumed connective tissue disease.    Relevant history:   Diabetes Mellitus: (--)  Bariatric surgery: (--)  Vegan/vegetarian: (+), for 4 years after 2014, has Hx of B12 deficiency   Celiac/Crohn's/UC:(--)  Substance use (juan. Nitrous oxide): (--)  Cervical/lumbar spondylosis: (+)  Recent immunization: (--)    Review of Systems   HENT:  Positive for sore throat (sensation of swelling in tung throat). Negative for trouble swallowing.    Eyes:  Negative for visual disturbance.   Gastrointestinal:  Negative for constipation and fecal incontinence.   Genitourinary:  Negative for bladder incontinence and difficulty urinating.   Musculoskeletal:  Positive for neck pain. " Negative for joint swelling.   Neurological:  Positive for numbness and headaches. Negative for weakness.   Psychiatric/Behavioral:  Negative for agitation and confusion.      Past Medical History:  -------------------------------------    Allergy    Atrial fibrillation    aproxx    IBS (irritable bowel syndrome)    SVT (supraventricular tachycardia)    Thrush    recurrent     Allergies:  Review of patient's allergies indicates:   Allergen Reactions    Diclofenac sodium Other (See Comments)     Other reaction(s): Bloody stool    Ortho tri-cyclen (21) Hives    Sulfa (sulfonamide antibiotics) Hives and Rash    Scopolamine     Sulfur Hives    Bactrim [sulfamethoxazole-trimethoprim] Rash    Tocilizumab Rash       Pertinent Family History:  Family History   Adopted: Yes   Problem Relation Name Age of Onset    Heart disease Maternal Grandmother      Eclampsia Neg Hx      Colon cancer Neg Hx         Pertinent Social History:  Social History     Tobacco Use    Smoking status: Never    Smokeless tobacco: Never   Substance Use Topics    Alcohol use: Yes     Comment: rarely    Drug use: No       Medications:  Current Outpatient Medications   Medication Instructions    aspirin 81 MG Chew No dose, route, or frequency recorded.    betamethasone dipropionate (DIPROLENE) 0.05 % ointment Apply topically.    bran/gum/fib/steven/psyl/kelp/pec (FIBER 6 ORAL)     CHOLECALCIFEROL, VITAMIN D3, (VITAMIN D3 ORAL) Take by mouth.    diltiaZEM (CARDIZEM CD) 240 mg, Oral, Daily    DULoxetine (CYMBALTA) 60 mg, Oral, Daily    famotidine (PEPCID) 40 mg, Oral, Nightly PRN    FLONASE ALLERGY RELIEF 50 mcg/actuation nasal spray     GI cocktail antac/dicyc/lidoc 30 ml  4 times a day  AC/HS PRN abdominal pain and burning    HYDROcodone-acetaminophen (NORCO) 5-325 mg per tablet 1 tablet, Oral, Every 8 hours PRN    KEVZARA 200 mg, Every 14 days    magnesium oxide 200 mg magnesium Chew Take by mouth.    meloxicam (MOBIC) 15 mg, Oral, Daily    multivit with  calcium,iron,min (MULTIVITAMIN-CALCIUM AND IRON ORAL) No dose, route, or frequency recorded.    omeprazole (PRILOSEC) 40 mg, Oral, Every morning    turmeric root extract 500 mg Cap     valACYclovir (VALTREX) 1,000 mg, Oral, Every 8 hours      Objective:     *exam is limited due to the virtual nature of this visit    General:  Well-appearing, well-nourished, NAD, cooperative    Neurologic Exam:   Awake, alert and oriented x3  Speech spontaneous and fluent, intact comprehension.   Adequate fund of knowledge, vocabulary.  EOM intact. No ophthalmoplegia.   Facial expression is full and symmetric.   Hearing is intact.  Antigravity in BUE. No tremor.      Pertinent lab results  *previous and recent results that were reviewed:  Lab Results   Component Value Date    ALPHATOCOPHE 1688 10/10/2024    VITAMINB6 31 10/10/2024    NNQPDOUY99VS 43 08/04/2014    ZINC 107 10/10/2024    COPPER 909 10/10/2024     Lab Results   Component Value Date    PATHINTPSPE REVIEWED 10/10/2024    PATHINTPSIF REVIEWED 10/10/2024     Lab Results   Component Value Date    ANASCREEN Negative <1:160 01/27/2014    ANTISSAANTIB 2.34 01/27/2014    RF <10.0 01/27/2014    SEDRATE 6 01/27/2014    COMPLEMENTC4 15 08/04/2014    COMPLEMENTC3 91 08/04/2014    ANTIGLIADINA 0.5 10/10/2024    ANTIGLIADIN <0.6 10/10/2024    TTGIGA <0.2 10/10/2024    TTGIGG <0.6 10/10/2024    CELACIMMUNA 188 10/10/2024     Lab Results   Component Value Date    IGGSERUM 865 08/04/2014     08/04/2014    IGM 77 08/04/2014    TSH 1.203 01/15/2025    WBC 3.45 (L) 01/15/2025    LYMPH 1.7 01/15/2025    LYMPH 48.4 (H) 01/15/2025    RBC 4.54 01/15/2025    HGB 13.6 01/15/2025    HCT 41.5 01/15/2025    MCV 91 01/15/2025     01/15/2025     01/15/2025    K 4.1 01/15/2025    CO2 21 (L) 01/15/2025    BUN 11 01/15/2025    CREATININE 0.8 01/15/2025    CALCIUM 9.0 01/15/2025    AST 26 01/15/2025    ALT 41 01/15/2025     *Outside labs reviewed in person during the encounter on  10/10/2024:  7/25/24  Esr 3  Cbc nl  Ck 56  HBsAg -  HCV -  Cmp nl  Mg 2  Crp 0.15  A1c 4.9  Vit 84  B12 751  folate >20  HIV-  RF -  ALEXIS+ (cyto, 1:80)  The rest negative  Myomarker -    EBV Ag IgG 276  EBV Ag IgM -  CCP -  Aldolase 3.1    Pertinent imaging results    *Images personally reviewed and interpreted:  1/15/2025  CTA Head & Neck:  No LVO, high grade stenosis, or aneurysms    *previously reviewed images:  10/17/2024  MRI Lumbar Spine w/wo contrast:  Multilevel DDD most pronounced at L3-4, L4-5, and L5-S1.  There is facet joint arthropathy at these levels.  There is mild crowding of the lateral recess bilaterally as well as mild bilateral foraminal stenosis at the L3-4 level.  3. At the L4-5 level there is mild spinal stenosis without foraminal stenosis but there is crowding of the left lateral recess with mild swelling of the left L5 root.    *previously reviewed images:  10/3/2024  MRI Brain w/wo contrast:  3-4 non-specific T2/FLAIR punctate foci scattered throughout the subcortical white matter    MRI Cervical Spine w/wo contrast:  Disc protrusion at C6-7 with no compression on the cord    MRI Thoracic Spine w/wo contrast:  No cord lesions    Other pertinent studies  *personally reviewed results:  12/16/2024  EMG-NCS:  There is electrophysiologic evidence of a right sensory median mononeuropathy across the wrist (I.e. Carpal tunnel syndrome).  There is no motor axonal loss.  This is graded as Mild in severity on the right.    There is no evidence of a large fiber peripheral polyneuropathy.    Right thumb localized pain and thumb locking is likely trigger thumb vs arthritis.  Consider referral to ortho hand to evaluate further.      11/10/2023  EMG-NCS:  Mild to moderate severity right carpal tunnel syndrome  Mild left carpal tunnel syndrome    Assessment:   Luisa Jesus is a 40 y.o. RH female with POTS/dysautonomia, cardiac arrhythmia, IBS, fibromyalgia, and lupus-like rheumatologic disorder, who  presented with right facial, occipital, and neck pain and numbness one day after receiving a new biologic agent (kevzara). I'm unsure if these events are directly related, considering her underlying autoimmunity and previous cervical surgery we will repeat imaging of the brain and cervical spine with dedicated images to the brain stem and cranial nerves to assess for structural pathologies. She may continue Robaxin (up to 1000 mg) every night for pain control.     1. Cervicogenic headache    2. Neck pain    3. Facial pain    4. Numbness and tingling of right face      Plan:     - methocarbamoL (ROBAXIN) 500 MG Tab; Take 2 tablets (1,000 mg total) by mouth every evening.  Dispense: 60 tablet; Refill: 1  - MRI Cervical Spine W WO Cont; Future  - MRI IAC/Temporal Bones W W/O Contrast; Future        Plan was discussed in detail with the patient, who is in agreement.      Disclaimer: This note was partly generated using dictation software which may occasionally result in transcription errors that are missed on review.      Based on our encounter today, my overall Medical Decision Making is a Level 4     Complexity of Problem: Moderate (1 undiagnosed new problem with uncertain prognosis)  Complexity of Data: Extensive (Review of >3 unique test results, Ordering each unique test, and Independent interpretation of tests)  Risk of Complications and/or morbidity/mortality of Management: Moderate risk (Prescription drug management)          Rylie Bustamante MD    [facility]  01/24/2025

## 2025-01-27 ENCOUNTER — TELEPHONE (OUTPATIENT)
Facility: CLINIC | Age: 40
End: 2025-01-27
Payer: OTHER GOVERNMENT

## 2025-01-27 NOTE — TELEPHONE ENCOUNTER
Spoke to patient to schedule appointment (NP) on jan 28 at 9am. Patient verbalized agreement

## 2025-01-28 ENCOUNTER — TELEPHONE (OUTPATIENT)
Facility: CLINIC | Age: 40
End: 2025-01-28

## 2025-01-28 ENCOUNTER — LAB VISIT (OUTPATIENT)
Dept: LAB | Facility: HOSPITAL | Age: 40
End: 2025-01-28
Attending: PSYCHIATRY & NEUROLOGY
Payer: OTHER GOVERNMENT

## 2025-01-28 ENCOUNTER — PATIENT MESSAGE (OUTPATIENT)
Dept: PAIN MEDICINE | Facility: CLINIC | Age: 40
End: 2025-01-28
Payer: OTHER GOVERNMENT

## 2025-01-28 ENCOUNTER — OFFICE VISIT (OUTPATIENT)
Facility: CLINIC | Age: 40
End: 2025-01-28
Payer: OTHER GOVERNMENT

## 2025-01-28 VITALS
HEART RATE: 93 BPM | HEIGHT: 64 IN | DIASTOLIC BLOOD PRESSURE: 84 MMHG | WEIGHT: 184 LBS | SYSTOLIC BLOOD PRESSURE: 121 MMHG | BODY MASS INDEX: 31.41 KG/M2

## 2025-01-28 DIAGNOSIS — G44.86 CERVICOGENIC HEADACHE: ICD-10-CM

## 2025-01-28 DIAGNOSIS — R20.2 PARESTHESIA: ICD-10-CM

## 2025-01-28 DIAGNOSIS — R20.2 PARESTHESIA: Primary | ICD-10-CM

## 2025-01-28 DIAGNOSIS — G50.1 ATYPICAL FACIAL PAIN: ICD-10-CM

## 2025-01-28 LAB — FOLATE SERPL-MCNC: >40 NG/ML (ref 4–24)

## 2025-01-28 PROCEDURE — 36415 COLL VENOUS BLD VENIPUNCTURE: CPT | Performed by: PSYCHIATRY & NEUROLOGY

## 2025-01-28 PROCEDURE — 99214 OFFICE O/P EST MOD 30 MIN: CPT | Mod: PBBFAC | Performed by: PSYCHIATRY & NEUROLOGY

## 2025-01-28 PROCEDURE — 84425 ASSAY OF VITAMIN B-1: CPT | Performed by: PSYCHIATRY & NEUROLOGY

## 2025-01-28 PROCEDURE — 99417 PROLNG OP E/M EACH 15 MIN: CPT | Mod: S$PBB,,, | Performed by: PSYCHIATRY & NEUROLOGY

## 2025-01-28 PROCEDURE — 82746 ASSAY OF FOLIC ACID SERUM: CPT | Performed by: PSYCHIATRY & NEUROLOGY

## 2025-01-28 PROCEDURE — 99999 PR PBB SHADOW E&M-EST. PATIENT-LVL IV: CPT | Mod: PBBFAC,,, | Performed by: PSYCHIATRY & NEUROLOGY

## 2025-01-28 PROCEDURE — 82607 VITAMIN B-12: CPT | Performed by: PSYCHIATRY & NEUROLOGY

## 2025-01-28 PROCEDURE — 99215 OFFICE O/P EST HI 40 MIN: CPT | Mod: S$PBB,,, | Performed by: PSYCHIATRY & NEUROLOGY

## 2025-01-28 PROCEDURE — 82300 ASSAY OF CADMIUM: CPT | Performed by: PSYCHIATRY & NEUROLOGY

## 2025-01-28 PROCEDURE — G2211 COMPLEX E/M VISIT ADD ON: HCPCS | Mod: S$PBB,,, | Performed by: PSYCHIATRY & NEUROLOGY

## 2025-01-28 NOTE — TELEPHONE ENCOUNTER
Called pt to schedule nerve block pt declined appt and stated she wants to think about it first then will give us a call back.

## 2025-01-28 NOTE — PROGRESS NOTES
[facility] [place]    Date: 1/28/25  Patient Name: Luisa Jesus   MRN: [id number]   Referring Provider: No ref. provider found    Thank you so much No ref. provider found for your patient referral to Neuromuscular team at [facility]. We take pride in our care coordination and look forward to your feedback and questions.    Assessment:   40-year-old female with longstanding history of dysautonomia following with Dr. Blu Cash MD for evaluation of right-sided facial and throat pain for 2nd opinion.  Clinically, patient has evidence of bilateral moderate severe occipital tenderness.  The differential diagnosis may include small fiber neuropathy, atypical facial pain, complex migraines.  For dysautonomia, I guided the patient to continue follow with Dr. Cash and her prior tilt table test was in 2013 what her symptoms worsened since she contracted COVID.  The new onset atypical facial pain could be secondary to atypical drug reaction, small fiber neuropathy, complex migraine, idiopathic.    I discussed with the patient about occipital nerve block for occipital neuralgia and it can help migraines too.  I addressed his/her complaints. I provided information about fall precautions and healthy lifestyle. I would wish her very best for improvement/recovery in her condition.    Future direction based on feedback:    Plan:     Problem List Items Addressed This Visit    None  Visit Diagnoses       Paresthesia    -  Primary    Relevant Orders    VITAMIN B1    FOLATE (Completed)    Vitamin B12 Deficiency Panel    HEAVY METALS SCREEN, BLOOD (QUANTITATIVE)          Jesse Matias MD    This evaluation was completed in >75  Minutes over 50% of the time spent on education & counseling. This includes face to face time and non-face to face time preparing to see the patient (eg, review of tests), obtaining and/or reviewing separately obtained history, documenting  clinical information in the electronic or other health record, independently interpreting results and communicating results to the patient/family/caregiver, or care coordinator.    Visit today is associated with current or anticipated ongoing medical care related to this patient's single serious condition/complex condition (dysautonomia, atypical facial pain, small fiber neuropathy). Follow up:  3 months    Patient note was created using MModal Dictation.  Any errors in syntax or even information may not have been identified and edited on initial review prior to signing this note.    Details provided by:    Patient    Reason for visit:  Paresthesia of right side of face     HISTORY OF PRESENT ILLNESS   Luisa Jesus Laurie, 40 years old female with PMHx for POTS, SVT, IBS, Connective tissue disorder presented today with complaints of numbness/tingling/burning sensation all over right side of the face for 2 weeks after Sarilumab infusion (3rd biological agent for her CTD).     She noted occasional pulsating pain below right ear and occipital region for 2 weeks. The symptoms improve with muscle relaxants, NSAIDS and acetaminophen doesn't improve her symptoms. Strong smells aggravate her symptoms. Occasional drooling of saliva for past 2 weeks especially during smiling or chewing foods. Patients feels her face is weak. Difficulty in chewing food especially on right side which brings in her numbness/tingling/burning sensation over the right side of her face. She went to ER with these symptoms after Sarilumab infusion (diagnosed with bells palsy ???) treated with steroids for 10 days after tapering she developed rashes over her lower extremities.     Complaints of fatigue for 2 years, which is constant and affects her ADL. Headaches for the past 5 years which is constant and dull aching and relieved by acetaminophen. H/o difficulty in raising her arm above her head more on right side, associated with tingling, numbness,  burning sensation in her arm and forearm for past 2 years. H/o difficulty in grabbing an object, opening a jar, writing for the past 2 years. H/o tingling/numbness/burning sensation in her finger tips more on her right side which radiates down the forearm and arm while raising her arm above the head for the past 2 years. H/o difficulty in walking downstair for the past 2 years. H/o tripping over the feet associated with minor falls (2 episodes) for the past 10 years. H/o difficulty in knee extension on both legs associated with pain. H/o back pain associated with electric shock like sensation passing more on the right leg. H/o neck pain, minimal relief with PT. Occasional muscle twitching going on for a year in her arm, forearm and legs. The patient notice thinning of limbs more on the right side. H/o tremors more on the right hand for the past 2 years. Chest tightening like sensation during sleep for the past 6-8 months, associated with labored breathing, last for 2 hours and relives on its own (last episode 3 weeks before). H/o dizziness/lightheadedness/presyncope since 2010 which got aggravated for the past 2 years. Loss of balance on closing eyes, falls 2-3 (last 2 yrs), no passing out episodes, no LOC, no seizures. H/o cold intolerance for the past 2 years. H/o difficulty in swallowing (more like bolus sensation in throat). No weight loss. H/o double vision for the past 2 years especially more in the morning and night, as the day progress she feels better. H/o right droopy eyelids for the past 2 years. No diurnal variation of weakness. Brain fog x 2years, no head injury/seizures. H/o overactive bladder, catheterization done with minimal improvement (Nov 2024). No diarrhea or constipation. No sleep issues.    Past H/o:  POTS- 2010  SVT-2014, Cardiac ablation -2014  IBS-2012  CTD-2022  COVID-2022, no hospital admission (all her current symptoms exaggerated after COVID)  Cervical Spine Sx-2009  Vegan diet-  [id number]    Family H/o:  Adopted  Biological mother: Stroke    Social H/o:  Non smoker  Occasional Alcohol: 1-2 glasses of jessica/month  No recreational drug    Chart Review:  Neurology note on 01/24/2025   She was just recently started Kevzara 2 weeks ago.  One day after the injection she developed numbness and pulsating pain on the right side of the face, inside the throat, occipital area and neck which prompted an ER visit.  She also feels a swelling inside her throat. The pain has been ongoing since January 15th. The pain is minimally subsided by a muscle relaxant.    Pertinent work up based on chart review for current condition:  TSH 1.2   Comprehensive metabolic panel normal   CBC with MCV 91   Protein electrophoresis  Normal total protein, normal pattern.  Immunofixation electrophoresis ,No monoclonal peaks identified.  Vitamin B6 level normal   Vitamin E level normal   Zinc level normal   Copper level normal   Celiac panel normal   Hemoglobin A1c 5.2   Vitamin-D level 43   C3 and C4 complement levels normal     Echocardiogram on 12/03/2024.    estimated ejection fraction of 65 - 70%. Ejection fraction is approximately 70%. Global longitudinal strain is -21.0%. Global longitudinal strain is normal. There is normal diastolic function.    Holter monitor on 12/03/2024.    The Extended Holter study demonstrates Sinus Rhythm with an average heart rate of 94 bpm. The minimum heart rate was 53 bpm, occurring at 05:43:17 D8. The maximum heart rate was 154 bpm    NCS/EMG on 12/16/2024.    There is electrophysiologic evidence of a right sensory median mononeuropathy across the wrist (I.e. Carpal tunnel syndrome).     MRI brain on 10/03/2024.    Normal     MRI thoracic and lumbar spine on 10/03/2024.         MRI cervical spine on 10/03/2024.        Review of Systems:  12 system review of systems is negative except for the symptoms mentioned in HPI.     PHYSICAL EXAMINATION     Vitals:    01/28/25 0906   BP: 121/84   BP  "Location: Left arm   Patient Position: Sitting   Pulse: 93   Weight: 83.4 kg (183 lb 15.6 oz)   Height: 5' 4" (1.626 m)       Body mass index is 31.58 kg/m².     GENERAL/CONSTITUTIONAL/SYSTEMIC:    -Well appearing; well nourished  -pinkish discoloration of hands.    -Prominent skin ridges    HIGHER INTEGRATIVE FUNCTIONS:   -Attention & concentration: Normal   -Orientation: Oriented to person, place & time  -Memory: Normal  -Language: Normal   -Fund of Knowledge: Normal     CRANIAL NERVES:   -CN 2: Visual fields full  -CN 2,3: PERRL  -CN 3,4,6: EOMI  -CN 5: Facial sensation intact bilaterally  -CN 7: Facial strength/movement intact bilaterally  -CN 8: Hearing normal bilaterally  -CN 9,10: Palate elevates symmetrically  -CN 11: Normal shoulder shrug and head turn  -CN 12: Tongue protrudes midline     MOTOR:   -Tone: normal in upper and lower extremities  -UE/LE motor: 5/5 throughout, no pronator drift     SENSATION:   -Intact bilaterally to Vibration and pin prick    REFLEXES:   -2/4 upper and lower extremities bilaterally  -Flexor plantar reflex bilaterally    COORDINATION:   -FNF normal bilaterally    GAIT:   -Normal casual  gait.     Physical Exam         Scheduled Follow-up :  Future Appointments   Date Time Provider Department Center   1/29/2025  1:30 PM Elsa Burgess PT [facility]   1/31/2025  8:30 AM [facility] MRI1 [address] LIMIT [facility]   1/31/2025  9:30 AM [facility] MRI1 [address] LIMIT [facility]   2/3/2025  1:30 PM Elsa Burgess PT [facility]   2/5/2025  1:30 PM Elsa Burgess PT [facility]   2/11/2025  2:30 PM Elsa Burgess PT [facility]   5/5/2025  1:45 PM [address]       After Visit Medication List :     Medication List            Accurate as of January 28, 2025  9:04 AM. If you have any questions, ask your nurse or doctor.                CONTINUE taking these medications      aspirin 81 MG Chew   "   betamethasone dipropionate 0.05 % ointment  Commonly known as: BETANATE     diltiaZEM 240 MG 24 hr capsule  Commonly known as: CARDIZEM CD  Take 1 capsule (240 mg total) by mouth once daily.     DULoxetine 60 MG capsule  Commonly known as: CYMBALTA  Take 1 capsule (60 mg total) by mouth once daily.     famotidine 40 MG tablet  Commonly known as: PEPCID  Take 1 tablet (40 mg total) by mouth nightly as needed for Heartburn.     FIBER 6 ORAL     FLONASE ALLERGY RELIEF 50 mcg/actuation nasal spray  Generic drug: fluticasone propionate     GI cocktail antac/dicyc/lidoc  30 ml  4 times a day  AC/HS PRN abdominal pain and burning     HYDROcodone-acetaminophen 5-325 mg per tablet  Commonly known as: NORCO  Take 1 tablet by mouth every 8 (eight) hours as needed for Pain.     KEVZARA 200 mg/1.14 mL Pnij  Generic drug: sarilumab     magnesium oxide 200 mg magnesium Chew     meloxicam 15 MG tablet  Commonly known as: MOBIC  Take 1 tablet (15 mg total) by mouth once daily.     methocarbamoL 500 MG Tab  Commonly known as: ROBAXIN  Take 2 tablets (1,000 mg total) by mouth every evening.     MULTIVITAMIN-CALCIUM AND IRON ORAL     omeprazole 40 MG capsule  Commonly known as: PRILOSEC  Take 1 capsule (40 mg total) by mouth every morning.     turmeric root extract 500 mg Cap     valACYclovir 1000 MG tablet  Commonly known as: VALTREX  Take 1 tablet (1,000 mg total) by mouth every 8 (eight) hours. for 7 days     VITAMIN D3 ORAL              Signing Physician:      Jesse Matias MD  , [employer] [facility] [employer] ([place]  Neuromuscular Medicine Section. [facility].   [address] [address] [place] [address].    This note was generated with the assistance of ambient listening technology. Verbal consent was obtained by the patient and accompanying visitor(s) for the recording of patient appointment to facilitate this note. I attest to having  reviewed and edited the generated note for accuracy, though some syntax or spelling errors may persist. Please contact the author of this note for any clarification.

## 2025-01-29 ENCOUNTER — CLINICAL SUPPORT (OUTPATIENT)
Dept: REHABILITATION | Facility: HOSPITAL | Age: 40
End: 2025-01-29
Payer: OTHER GOVERNMENT

## 2025-01-29 ENCOUNTER — PATIENT MESSAGE (OUTPATIENT)
Dept: NEUROLOGY | Facility: CLINIC | Age: 40
End: 2025-01-29
Payer: OTHER GOVERNMENT

## 2025-01-29 DIAGNOSIS — M54.2 NECK PAIN: Primary | ICD-10-CM

## 2025-01-29 PROCEDURE — 97140 MANUAL THERAPY 1/> REGIONS: CPT

## 2025-01-29 NOTE — PROGRESS NOTES
[facility]   Physical Therapy Treatment Note       [facility]  Cl[facility] Number: [id number]    Therapy Diagnosis:   Encounter Diagnosis   Name Primary?    Neck pain Yes     Physician: Konstantin Linder*    Visit Date: 1/29/2025    Physician Orders: PT Eval and Treat   Medical Diagnosis:   M47.812 (ICD-10-CM) - Cervical spondylosis   M54.81 (ICD-10-CM) - Bilateral occipital neuralgia   M47.816 (ICD-10-CM) - Lumbar spondylosis   Evaluation Date: 12/5/2024  Authorization period Expiration: 12/31/2024  Plan of Care Expiration: 2/28/2025  Progress Note Due: 1/17/2025  Visit #/Visits authorized: 5/12   FOTO: 1/ 3         Precautions: Standard and chronic inflammation, occipital neuralgia   Date of Surgery: NA     PTA Visit #: 0/5     Time In: 1:30  Time Out: 2:10  Total Billable Time: 40 minutes  SUBJECTIVE      Pt reports: after last PT visit, she states she went to ER and stated that she may have Martinton's Palsy.  She is scheduled to get a contrast MRI of head and neck on 1/31/25.  Response to previous treatment: increased pain  Functional change: none yet    Pain: 5/10  Location: right sided cervical and face     History of Current Condition: Laurie reports: 2 year history of symptoms, including global pain and achiness with recent exacerbation in neck and back. Back pain is bilateral but has more pain on the right side, including her right hip, knee and ankle. This does not seem to follow a neurological pathway, more joint pain. She reports neck pain to be worse than back at this time and would like to focus on the neck today. She states neck pain begins in suboccipital region and radiates down into the upper traps bilaterally. She states every morning she spends about an hour stretching then gets into the bath to relax her muscles before she can start her day. She has a history of tachycardia and is currently wearing a Holter monitor to track the arrhythmias. She has  "an autoimmune disorder, causing chronic inflammation and pain affecting her ability to perform her daily activities. She also reports daily headaches.    OBJECTIVE     Objective Measures updated at progress report unless specified.    Treatment   Laurie received the following manual therapy techniques for 40 minutes:   Grade II upper cervical side glides, rotational mobs  Grade II cervical posterior-anterior mobs  PROM cervical spine all planes except extension  Gentle soft tissue mobilization to bilateral cervical paravertebral muscles, bilateral suboccipitals, and bilateral upper traps  Manual stretching bilateral upper traps and levator scaps     Laurie performed the following therapeutic exercises for 0 minutes:    CROM in all planes x 5  Chin tucks x 10  Levator scapulae 5 x 5"  Head tilt stretch 5 x 5"  Scapular retractions 10 x 5"  Rowing RTB x 15  Cervical isometrics in all planes x 5      Possible next visit   Deep neck flexors supine  Scap retractions  Prone W lift off/ wall W lift off  Supine horizontal abduction, yellow thera-band      Patient Education and Home Exercises     Home Exercises Provided and Patient Education Provided     Education provided:   - discussed hydration following soft tissue work  - monitor symptoms    Written Home Exercises Provided:  not yet .       ASSESSMENT      Performed gentle manual this date with rest breaks as needed. Patient with audible cavitation while performing PROM. Did increase pressure on paravertebral muscles this date, still very gentle with suboccipitals. Patient did demonstrate an inflammatory response during treatment, including redness around the area being worked and spreading to the anterior neck and chest. Patient reporting no other adverse effects during treatment.     Paste from lalo Moran is a 39 y.o. female referred to outpatient physical therapy with a medical diagnosis of M47.812 (ICD-10-CM)- Cervical spondylosis, M54.81 (ICD-10-CM)- Bilateral " occipital neuralgia, M47.816 (ICD-10-CM)- Lumbar spondylosis, and presents to PT with pain limiting function, weakness in deep neck flexors, lower traps, and  bilaterally and general malaise. Patient to benefit from skilled therapy to prevent further decline in functional status. Patient with good tolerance to initial treatment which focused on gentle mobility of the cervical spine and surrounding soft tissues. All questions and concerns addressed.     Pt will continue to benefit from skilled outpatient physical therapy to address the deficits listed in the problem list box on initial evaluation, provide pt/family education and to maximize pt's level of independence in the home and community environment.     Goals:   Short Term Goals: 3 weeks  Demonstrate improvement in recent symptoms to progress toward long term goals  Correct postural deviations in sitting and standing to decrease pain and promote postural awareness for injury prevention.  Demonstrate compliance with initial exercise program     Long Term Goals: 6 weeks  Improve  strength by 5# bilaterally  Perform household and work tasks with minimal limitation  Report 50-75% decrease in frequency of headaches  Improve strength in mid back muscles and deep neck flexors by 1/2 MMT grade or better  Demonstrate independence with home exercise program to maintain gains made in therapy.    PLAN   Certification Period: 12/5/2024 to 2/28/2025.     Outpatient Physical Therapy 1-2 times weekly for 6 weeks to include the following interventions: patient education, Manual Therapy, Moist Heat/ Ice, Neuromuscular Re-ed, Patient Education, Self Care, Therapeutic Activities, Therapeutic Exercise, and Ultrasound.   Pt may be seen by PTA as part of the rehabilitation team.     Continue to progress per plan of care. Advance as tolerated     Elsa Burgess, PT

## 2025-01-30 LAB
ARSENIC BLD-MCNC: <1 NG/ML
CADMIUM BLD-MCNC: 0.2 NG/ML
CITY: NORMAL
COUNTY: NORMAL
GUARDIAN FIRST NAME: NORMAL
GUARDIAN LAST NAME: NORMAL
HOME PHONE: NORMAL
LEAD BLD-MCNC: <1 MCG/DL
MERCURY BLD-MCNC: <1 NG/ML
RACE: NORMAL
STATE: NORMAL
STREET ADDRESS: NORMAL
VENOUS/CAPILLARY: NORMAL
VIT B12 SERPL-MCNC: 572 NG/L (ref 180–914)
ZIP: NORMAL

## 2025-01-31 ENCOUNTER — HOSPITAL ENCOUNTER (OUTPATIENT)
Dept: RADIOLOGY | Facility: HOSPITAL | Age: 40
Discharge: HOME | End: 2025-01-31
Attending: STUDENT IN AN ORGANIZED HEALTH CARE EDUCATION/TRAINING PROGRAM
Payer: OTHER GOVERNMENT

## 2025-01-31 DIAGNOSIS — R20.2 NUMBNESS AND TINGLING OF RIGHT FACE: ICD-10-CM

## 2025-01-31 DIAGNOSIS — R51.9 FACIAL PAIN: ICD-10-CM

## 2025-01-31 DIAGNOSIS — R20.0 NUMBNESS AND TINGLING OF RIGHT FACE: ICD-10-CM

## 2025-01-31 DIAGNOSIS — M54.2 NECK PAIN: ICD-10-CM

## 2025-01-31 DIAGNOSIS — G44.86 CERVICOGENIC HEADACHE: ICD-10-CM

## 2025-01-31 LAB — VIT B1 BLD-MCNC: 63 UG/L (ref 38–122)

## 2025-01-31 PROCEDURE — 72156 MRI NECK SPINE W/O & W/DYE: CPT | Mod: 26,,, | Performed by: RADIOLOGY

## 2025-01-31 PROCEDURE — 70553 MRI BRAIN STEM W/O & W/DYE: CPT | Mod: TC

## 2025-01-31 PROCEDURE — 72156 MRI NECK SPINE W/O & W/DYE: CPT | Mod: TC

## 2025-01-31 PROCEDURE — 25500020 PHARM REV CODE 255: Performed by: STUDENT IN AN ORGANIZED HEALTH CARE EDUCATION/TRAINING PROGRAM

## 2025-01-31 PROCEDURE — 70553 MRI BRAIN STEM W/O & W/DYE: CPT | Mod: 26,,, | Performed by: RADIOLOGY

## 2025-01-31 PROCEDURE — A9585 GADOBUTROL INJECTION: HCPCS | Performed by: STUDENT IN AN ORGANIZED HEALTH CARE EDUCATION/TRAINING PROGRAM

## 2025-01-31 RX ORDER — GADOBUTROL 604.72 MG/ML
8 INJECTION INTRAVENOUS
Status: COMPLETED | OUTPATIENT
Start: 2025-01-31 | End: 2025-01-31

## 2025-01-31 RX ADMIN — GADOBUTROL 8 ML: 604.72 INJECTION INTRAVENOUS at 10:01

## 2025-02-03 ENCOUNTER — CLINICAL SUPPORT (OUTPATIENT)
Dept: REHABILITATION | Facility: HOSPITAL | Age: 40
End: 2025-02-03
Payer: OTHER GOVERNMENT

## 2025-02-03 DIAGNOSIS — M54.2 NECK PAIN: Primary | ICD-10-CM

## 2025-02-03 PROCEDURE — 97110 THERAPEUTIC EXERCISES: CPT

## 2025-02-03 NOTE — PROGRESS NOTES
[facility]   Physical Therapy Treatment Note       [facility]  [facility] [id number]    Therapy Diagnosis:   Encounter Diagnosis   Name Primary?    Neck pain Yes     Physician: Konstantin Linder*    Visit Date: 2/3/2025    Physician Orders: PT Eval and Treat   Medical Diagnosis:   M47.812 (ICD-10-CM) - Cervical spondylosis   M54.81 (ICD-10-CM) - Bilateral occipital neuralgia   M47.816 (ICD-10-CM) - Lumbar spondylosis   Evaluation Date: 12/5/2024  Authorization period Expiration: 12/31/2024  Plan of Care Expiration: 2/28/2025  Progress Note Due: 1/17/2025  Visit #/Visits authorized: 6/12   FOTO: 1/ 3         Precautions: Standard and chronic inflammation, occipital neuralgia   Date of Surgery: NA     PTA Visit #: 0/5     Time In: 1:30  Time Out: 2:25  Total Billable Time: 45 minutes  SUBJECTIVE      Pt reports: she has increased lumbar pain today  Response to previous treatment: no change  Functional change: none yet    Pain: 4/10  Location: right sided cervical and face (6/10 lumbar)    History of Current Condition: Laurie reports: 2 year history of symptoms, including global pain and achiness with recent exacerbation in neck and back. Back pain is bilateral but has more pain on the right side, including her right hip, knee and ankle. This does not seem to follow a neurological pathway, more joint pain. She reports neck pain to be worse than back at this time and would like to focus on the neck today. She states neck pain begins in suboccipital region and radiates down into the upper traps bilaterally. She states every morning she spends about an hour stretching then gets into the bath to relax her muscles before she can start her day. She has a history of tachycardia and is currently wearing a Holter monitor to track the arrhythmias. She has an autoimmune disorder, causing chronic inflammation and pain affecting her ability to perform her daily activities.  "She also reports daily headaches.    OBJECTIVE     Objective Measures updated at progress report unless specified.    Treatment   Laurie received the following manual therapy techniques for 0 minutes:   Grade II upper cervical side glides, rotational mobs  Grade II cervical posterior-anterior mobs  PROM cervical spine all planes except extension  Gentle soft tissue mobilization to bilateral cervical paravertebral muscles, bilateral suboccipitals, and bilateral upper traps  Manual stretching bilateral upper traps and levator scaps     Laurie performed the following therapeutic exercises for 55 minutes:    CROM in all planes x 5  Chin tucks x 10  Levator scapulae 10 x 10"  Head tilt stretch 10 x 10"  Scapular retractions 10 x 5"  Rowing RTB x 15  Cervical isometrics in all planes x 5    UBE Lvl 1 x 5 min  PPT x 10  LTR x 10  Modified crunch x 10  Open book x 10  Clams x 10  DKTC on SB x 10  SKTC on SB x 10  Hamstring stretch long seat 3 x 30"      Possible next visit   Deep neck flexors supine  Scap retractions  Prone W lift off/ wall W lift off  Supine horizontal abduction, yellow thera-band      Patient Education and Home Exercises     Home Exercises Provided and Patient Education Provided     Education provided:   - discussed hydration following soft tissue work  - monitor symptoms    Written Home Exercises Provided:  not yet .       ASSESSMENT      Performed gentle manual this date with rest breaks as needed. Patient with audible cavitation while performing PROM. Did increase pressure on paravertebral muscles this date, still very gentle with suboccipitals. Patient did demonstrate an inflammatory response during treatment, including redness around the area being worked and spreading to the anterior neck and chest. Patient reporting no other adverse effects during treatment.     Paste from lalo Moran is a 39 y.o. female referred to outpatient physical therapy with a medical diagnosis of M47.812 (ICD-10-CM)- " Cervical spondylosis, M54.81 (ICD-10-CM)- Bilateral occipital neuralgia, M47.816 (ICD-10-CM)- Lumbar spondylosis, and presents to PT with pain limiting function, weakness in deep neck flexors, lower traps, and  bilaterally and general malaise. Patient to benefit from skilled therapy to prevent further decline in functional status. Patient with good tolerance to initial treatment which focused on gentle mobility of the cervical spine and surrounding soft tissues. All questions and concerns addressed.     Pt will continue to benefit from skilled outpatient physical therapy to address the deficits listed in the problem list box on initial evaluation, provide pt/family education and to maximize pt's level of independence in the home and community environment.     Goals:   Short Term Goals: 3 weeks  Demonstrate improvement in recent symptoms to progress toward long term goals  Correct postural deviations in sitting and standing to decrease pain and promote postural awareness for injury prevention.  Demonstrate compliance with initial exercise program     Long Term Goals: 6 weeks  Improve  strength by 5# bilaterally  Perform household and work tasks with minimal limitation  Report 50-75% decrease in frequency of headaches  Improve strength in mid back muscles and deep neck flexors by 1/2 MMT grade or better  Demonstrate independence with home exercise program to maintain gains made in therapy.    PLAN   Certification Period: 12/5/2024 to 2/28/2025.     Outpatient Physical Therapy 1-2 times weekly for 6 weeks to include the following interventions: patient education, Manual Therapy, Moist Heat/ Ice, Neuromuscular Re-ed, Patient Education, Self Care, Therapeutic Activities, Therapeutic Exercise, and Ultrasound.   Pt may be seen by PTA as part of the rehabilitation team.     Continue to progress per plan of care. Advance as tolerated     Elsa Burgess, PT

## 2025-02-10 ENCOUNTER — PATIENT MESSAGE (OUTPATIENT)
Dept: OBSTETRICS AND GYNECOLOGY | Facility: CLINIC | Age: 40
End: 2025-02-10
Payer: OTHER GOVERNMENT

## 2025-02-10 ENCOUNTER — PATIENT MESSAGE (OUTPATIENT)
Dept: PAIN MEDICINE | Facility: CLINIC | Age: 40
End: 2025-02-10
Payer: OTHER GOVERNMENT

## 2025-02-10 DIAGNOSIS — N63.0 MASS OF BREAST, UNSPECIFIED LATERALITY: Primary | ICD-10-CM

## 2025-02-11 ENCOUNTER — ANESTHESIA EVENT (OUTPATIENT)
Dept: ENDOSCOPY | Facility: HOSPITAL | Age: 40
End: 2025-02-11
Payer: OTHER GOVERNMENT

## 2025-02-11 ENCOUNTER — HOSPITAL ENCOUNTER (OUTPATIENT)
Facility: HOSPITAL | Age: 40
Discharge: HOME | End: 2025-02-11
Attending: INTERNAL MEDICINE | Admitting: INTERNAL MEDICINE
Payer: OTHER GOVERNMENT

## 2025-02-11 ENCOUNTER — ANESTHESIA (OUTPATIENT)
Dept: ENDOSCOPY | Facility: HOSPITAL | Age: 40
End: 2025-02-11
Payer: OTHER GOVERNMENT

## 2025-02-11 DIAGNOSIS — K44.9 HIATAL HERNIA: ICD-10-CM

## 2025-02-11 DIAGNOSIS — K29.70 GASTRITIS, PRESENCE OF BLEEDING UNSPECIFIED, UNSPECIFIED CHRONICITY, UNSPECIFIED GASTRITIS TYPE: Primary | ICD-10-CM

## 2025-02-11 DIAGNOSIS — R10.13 EPIGASTRIC PAIN: ICD-10-CM

## 2025-02-11 LAB
B-HCG UR QL: NEGATIVE
CTP QC/QA: YES

## 2025-02-11 PROCEDURE — 43239 EGD BIOPSY SINGLE/MULTIPLE: CPT | Performed by: INTERNAL MEDICINE

## 2025-02-11 PROCEDURE — 43239 EGD BIOPSY SINGLE/MULTIPLE: CPT | Mod: ,,, | Performed by: INTERNAL MEDICINE

## 2025-02-11 PROCEDURE — 88305 TISSUE EXAM BY PATHOLOGIST: CPT | Mod: TC,59 | Performed by: PATHOLOGY

## 2025-02-11 PROCEDURE — 25000003 PHARM REV CODE 250: Performed by: INTERNAL MEDICINE

## 2025-02-11 PROCEDURE — 81025 URINE PREGNANCY TEST: CPT | Performed by: INTERNAL MEDICINE

## 2025-02-11 PROCEDURE — 27201012 HC FORCEPS, HOT/COLD, DISP: Performed by: INTERNAL MEDICINE

## 2025-02-11 PROCEDURE — 37000008 HC ANESTHESIA 1ST 15 MINUTES: Performed by: INTERNAL MEDICINE

## 2025-02-11 PROCEDURE — 63600175 PHARM REV CODE 636 W HCPCS: Performed by: NURSE ANESTHETIST, CERTIFIED REGISTERED

## 2025-02-11 RX ORDER — PROPOFOL 10 MG/ML
VIAL (ML) INTRAVENOUS
Status: DISCONTINUED | OUTPATIENT
Start: 2025-02-11 | End: 2025-02-11

## 2025-02-11 RX ORDER — LIDOCAINE HYDROCHLORIDE 20 MG/ML
INJECTION INTRAVENOUS
Status: DISCONTINUED | OUTPATIENT
Start: 2025-02-11 | End: 2025-02-11

## 2025-02-11 RX ORDER — SODIUM CHLORIDE 9 MG/ML
INJECTION, SOLUTION INTRAVENOUS CONTINUOUS
Status: DISCONTINUED | OUTPATIENT
Start: 2025-02-11 | End: 2025-02-11 | Stop reason: HOSPADM

## 2025-02-11 RX ADMIN — PROPOFOL 100 MG: 10 INJECTION, EMULSION INTRAVENOUS at 10:02

## 2025-02-11 RX ADMIN — SODIUM CHLORIDE: 9 INJECTION, SOLUTION INTRAVENOUS at 10:02

## 2025-02-11 RX ADMIN — LIDOCAINE HYDROCHLORIDE 100 MG: 20 INJECTION, SOLUTION INTRAVENOUS at 10:02

## 2025-02-11 NOTE — [employer] PATIENT INSTRUCTIONS
Discharge Summary/Instructions after an Endoscopic Procedure  Patient Name: Luisa Jesus  Patient MRN: [id number]  Patient YOB: 1985 Tuesday, February 11, 2025  Brian Medina MD  Dear patient,  As a result of recent federal legislation (The Federal Cures Act), you may   receive lab or pathology results from your procedure in your [facility]   account before your physician is able to contact you. Your physician or   their representative will relay the results to you with their   recommendations at their soonest availability.  Thank you,  RESTRICTIONS:  During your procedure today, you received medications for sedation.  These   medications may affect your judgment, balance and coordination.  Therefore,   for 24 hours, you have the following restrictions:   - DO NOT drive a car, operate machinery, make legal/financial decisions,   sign important papers or drink alcohol.    ACTIVITY:  Today: no heavy lifting, straining or running due to procedural   sedation/anesthesia.  The following day: return to full activity including work.  DIET:  Eat and drink normally unless instructed otherwise.     TREATMENT FOR COMMON SIDE EFFECTS:  - Mild abdominal pain, nausea, belching, bloating or excessive gas:  rest,   eat lightly and use a heating pad.  - Sore Throat: treat with throat lozenges and/or gargle with warm salt   water.  - Because air was used during the procedure, expelling large amounts of air   from your rectum or belching is normal.  - If a bowel prep was taken, you may not have a bowel movement for 1-3 days.    This is normal.  SYMPTOMS TO WATCH FOR AND REPORT TO YOUR PHYSICIAN:  1. Abdominal pain or bloating, other than gas cramps.  2. Chest pain.  3. Back pain.  4. Signs of infection such as: chills or fever occurring within 24 hours   after the procedure.  5. Rectal bleeding, which would show as bright red, maroon, or black stools.   (A tablespoon of blood from the rectum is not serious, especially  if   hemorrhoids are present.)  6. Vomiting.  7. Weakness or dizziness.  GO DIRECTLY TO THE NEAREST EMERGENCY ROOM IF YOU HAVE ANY OF THE FOLLOWING:      Difficulty breathing              Chills and/or fever over 101 F   Persistent vomiting and/or vomiting blood   Severe abdominal pain   Severe chest pain   Black, tarry stools   Bleeding- more than one tablespoon   Any other symptom or condition that you feel may need urgent attention  Your doctor recommends these additional instructions:  If any biopsies were taken, your doctors clinic will contact you in 1 to 2   weeks with any results.  - Patient has a contact number available for emergencies.  The signs and   symptoms of potential delayed complications were discussed with the   patient.  Return to normal activities tomorrow.  Written discharge   instructions were provided to the patient.   - Resume previous diet.   - Continue present medications.   - No aspirin, ibuprofen, naproxen, or other non-steroidal anti-inflammatory   drugs.   - Await pathology results.   - Discharge patient to home (ambulatory).   - Follow an antireflux regimen.   - Return to GI office after studies are complete.  For questions, problems or results please call your physician - Brian Medina MD at Work:  [phone].  [facility], EMERGENCY ROOM PHONE NUMBER: [phone]  IF A COMPLICATION OR EMERGENCY SITUATION ARISES AND YOU ARE UNABLE TO REACH   YOUR PHYSICIAN - GO DIRECTLY TO THE EMERGENCY ROOM.  Brian Medina MD  2/11/2025 11:00:24 AM  This report has been verified and signed electronically.  Dear patient,  As a result of recent federal legislation (The Federal Cures Act), you may   receive lab or pathology results from your procedure in your [facility]   account before your physician is able to contact you. Your physician or   their representative will relay the results to you with their   recommendations at their soonest availability.  Thank you,  [employer]ATION

## 2025-02-11 NOTE — TRANSFER OF CARE
"Anesthesia Transfer of Care Note    Patient: Luisa Jesus    Procedure(s) Performed: Procedure(s) (LRB):  EGD (ESOPHAGOGASTRODUODENOSCOPY) (N/A)    Patient location: GI    Anesthesia Type: general    Transport from OR: Transported from OR on room air with adequate spontaneous ventilation    Post pain: adequate analgesia    Post assessment: no apparent anesthetic complications and tolerated procedure well    Post vital signs: stable    Level of consciousness: awake, alert and oriented    Nausea/Vomiting: no nausea/vomiting    Complications: none    Transfer of care protocol was followed    Last vitals: Visit Vitals  /84 (BP Location: Left arm, Patient Position: Lying)   Pulse 96   Temp 37.3 °C (99.1 °F) (Skin)   Resp 18   Ht 5' 3.5" (1.613 m)   Wt 83 kg (183 lb)   SpO2 99%   Breastfeeding No   BMI 31.91 kg/m²     "

## 2025-02-11 NOTE — ANESTHESIA PREPROCEDURE EVALUATION
02/11/2025  Luisa Jesus is a 40 y.o., female.      Pre-op Assessment          Review of Systems  Cardiovascular:         Dysrhythmias        MODI           Shortness of Breath Dyspnea On Extertion                     Disorder of Cardiac Rhythm, Atrial Fibrillation     Pulmonary:      Shortness of breath                  Hepatic/GI:     GERD         Gerd              Physical Exam  General: Well nourished        Anesthesia Plan  Type of Anesthesia, risks & benefits discussed:    Anesthesia Type: Gen Natural Airway  Intra-op Monitoring Plan: Standard ASA Monitors  Induction:  IV  Informed Consent: Informed consent signed with the Patient and all parties understand the risks and agree with anesthesia plan.  All questions answered.   ASA Score: 3  Day of Surgery Review of History & Physical: H&P Update referred to the surgeon/provider.    Ready For Surgery From Anesthesia Perspective.     .

## 2025-02-11 NOTE — ANESTHESIA POSTPROCEDURE EVALUATION
Anesthesia Post Evaluation    Patient: Luisa Jesus    Procedure(s) Performed: Procedure(s) (LRB):  EGD (ESOPHAGOGASTRODUODENOSCOPY) (N/A)    Final Anesthesia Type: general      Patient location during evaluation: PACU  Patient participation: Yes- Able to Participate  Level of consciousness: awake and alert  Post-procedure vital signs: reviewed and stable  Pain management: adequate  Airway patency: patent    PONV status at discharge: No PONV  Anesthetic complications: no      Cardiovascular status: blood pressure returned to baseline  Respiratory status: unassisted and room air  Hydration status: euvolemic  Follow-up not needed.              Vitals Value Taken Time   /79 02/11/25 1119   Temp  02/11/25 1151   Pulse 94 02/11/25 1124   Resp 38 02/11/25 1124   SpO2 99 % 02/11/25 1124   Vitals shown include unfiled device data.      Event Time   Out of Recovery 11:29:19         Pain/Nata Score: Nata Score: 10 (2/11/2025 11:20 AM)

## 2025-02-11 NOTE — PLAN OF CARE
PIV removed, pt tolerated well. Pt sitting with family as BS. Belongings returned to pt. Pain status 0/10.

## 2025-02-12 ENCOUNTER — PATIENT MESSAGE (OUTPATIENT)
Dept: CARDIOLOGY | Facility: CLINIC | Age: 40
End: 2025-02-12
Payer: OTHER GOVERNMENT

## 2025-02-12 VITALS
HEIGHT: 64 IN | HEART RATE: 94 BPM | TEMPERATURE: 99 F | WEIGHT: 183 LBS | BODY MASS INDEX: 31.24 KG/M2 | OXYGEN SATURATION: 99 % | SYSTOLIC BLOOD PRESSURE: 136 MMHG | DIASTOLIC BLOOD PRESSURE: 84 MMHG | RESPIRATION RATE: 16 BRPM

## 2025-02-13 ENCOUNTER — TELEPHONE (OUTPATIENT)
Dept: PAIN MEDICINE | Facility: CLINIC | Age: 40
End: 2025-02-13
Payer: OTHER GOVERNMENT

## 2025-02-13 ENCOUNTER — OFFICE VISIT (OUTPATIENT)
Dept: SURGERY | Facility: CLINIC | Age: 40
End: 2025-02-13
Payer: OTHER GOVERNMENT

## 2025-02-13 VITALS
HEART RATE: 109 BPM | BODY MASS INDEX: 31.24 KG/M2 | OXYGEN SATURATION: 98 % | SYSTOLIC BLOOD PRESSURE: 123 MMHG | WEIGHT: 183 LBS | HEIGHT: 64 IN | DIASTOLIC BLOOD PRESSURE: 84 MMHG

## 2025-02-13 DIAGNOSIS — M47.812 CERVICAL SPONDYLOSIS WITHOUT MYELOPATHY: Primary | ICD-10-CM

## 2025-02-13 DIAGNOSIS — M54.81 BILATERAL OCCIPITAL NEURALGIA: ICD-10-CM

## 2025-02-13 DIAGNOSIS — R22.33 AXILLARY MASS, BILATERAL: ICD-10-CM

## 2025-02-13 DIAGNOSIS — N60.19 FIBROCYSTIC BREAST DISEASE (FCBD), UNSPECIFIED LATERALITY: Primary | ICD-10-CM

## 2025-02-13 DIAGNOSIS — N63.0 MASS OF BREAST, UNSPECIFIED LATERALITY: ICD-10-CM

## 2025-02-13 DIAGNOSIS — N64.52 NIPPLE DISCHARGE IN FEMALE: ICD-10-CM

## 2025-02-13 PROCEDURE — 99999 PR PBB SHADOW E&M-EST. PATIENT-LVL III: CPT | Mod: PBBFAC,,, | Performed by: ORTHOPAEDIC SURGERY

## 2025-02-13 PROCEDURE — 99213 OFFICE O/P EST LOW 20 MIN: CPT | Mod: PBBFAC | Performed by: ORTHOPAEDIC SURGERY

## 2025-02-13 RX ORDER — ONDANSETRON 4 MG/1
4 TABLET, FILM COATED ORAL EVERY 8 HOURS PRN
COMMUNITY
Start: 2025-01-16

## 2025-02-13 NOTE — TELEPHONE ENCOUNTER
Types of orders made on 02/13/2025: Procedure Request      Order Date:2/13/2025   Ordering User:KONSTANTIN QUIROGA [[id number]]   Encounter Provider:Konstantin Quiroga MD [[id number]]   Authorizing Provider: Konstantin Quiroga MD [[id number]]   Department:[facility] PAIN MANAGEMENT[[id number]]      Common Order Information   Procedure -> Medial Branch Block (Specify level and laterality) Cmt: bilateral             TON, C3, and C4 blocks      Order Specific Information   Order: Procedure Order to Pain Management [Custom: [id number]]  Order #:          [id number]: 1 FUTURE     Priority: Routine  Class: Clinic Performed     Future Order Information       Expires on:02/13/2026            Expected by:02/13/2025                   Associated Diagnoses       M47.812 Cervical spondylosis without myelopathy       M54.81 Bilateral occipital neuralgia       Physician -> daja          Is patient on anti-coagulants? Cmt: dont stop          Facility Name: -> [facility]              Priority: Routine  Class: Clinic Performed     Future Order Information       Expires on:02/13/2026            Expected by:02/13/2025                   Associated Diagnoses       M47.812 Cervical spondylosis without myelopathy       M54.81 Bilateral occipital neuralgia       Procedure -> Medial Branch Block (Specify level and laterality) Cmt:                 bilateral TON, C3, and C4 blocks          Physician -> daja

## 2025-02-13 NOTE — PROGRESS NOTES
[facility]  Department of Surgery      REFERRING PROVIDER: Alisson Cook PA-C  [address]  [address]  [place] [address]    Chief Complaint: Breast Mass (L fibroadenoma), Axillary Mass (L axillary pain), Axillary Swelling, Breast Cyst (L breast cysts), and Breast Pain (B/L Breast pain with benign masses)      Subjective:      Patient ID: Luisa Jesus is a 40 y.o. female who presents with bilateral breast pain, left axillary mass. States she has had left axillary mass/ pain for the last two years. In the last year it has been fluctuating in size more frequently and enlarging in general. Patient does routinely do self breast exams and has noted a change on breast exam.  Patient admits to nipple discharge. Reports a few episodes of spontaneous left nipple discharge, discharge is clear/ milky and from two ducts. Patient admits to to previous breast biopsy. Pt has screening mammogram 10/2024 which showed a mass at 12 OC, BIRADs 0. Follow-up US showed numerous bilateral simple cysts and cysts clusters, the largest measuring 3.8 cm at 12 OC, 2 CFN accounting for the mammogram findings. However, two other findings were noted on US that did not have mammogram correlate: 10 mm oval mass at 11 OC and 6 mm round mass at 10 OC which were both recommended for biopsy. US guided bx was performed on 24 with pathology revealing benign nodular fibrotic stroma and fibroadenomatoid change.      He main concern today is the left axillary enlargement. States she never had imaging that specifically evaluated this area.     GYN History:  Age of menarche was 9.   Premenopausal   Last menstrual period was 25.   Patient admits to hormonal therapy. Estrogen/ progesterone a few months.   Patient is .   Age of first live birth was 22.   Patient did breast feed.    Family history is unknown 2/2 being adopted.     Past Medical History:   Diagnosis Date    Allergy     Atrial fibrillation 10/01/2023     aproxx    General anesthetics causing adverse effect in therapeutic use     IBS (irritable bowel syndrome)     SVT (supraventricular tachycardia)     Thrush 01/22/2015    recurrent     Past Surgical History:   Procedure Laterality Date    CHOLECYSTECTOMY      Dx Laparoscopy      presumed endometriosis    EPIDURAL STEROID INJECTION INTO LUMBAR SPINE N/A 11/15/2024    Procedure: Injection-steroid-epidural-lumbar;  Surgeon: Konstantin Linder MD;  Location: [facility] OR;  Service: Pain Management;  Laterality: N/A;    ESOPHAGOGASTRODUODENOSCOPY N/A 2/11/2025    Procedure: EGD (ESOPHAGOGASTRODUODENOSCOPY);  Surgeon: Brian Pavon MD;  Location: [facility] ENDO;  Service: Endoscopy;  Laterality: N/A;    SINUS SURGERY  01/01/2007    SPINE SURGERY      C spine    TONSILLECTOMY      VENTRICULAR ABLATION SURGERY      WISDOM TOOTH EXTRACTION       Current Outpatient Medications on File Prior to Visit   Medication Sig Dispense Refill    acetaminophen (TYLENOL ARTHRITIS PAIN ORAL) Take by mouth.      aspirin 81 MG Chew       betamethasone dipropionate (DIPROLENE) 0.05 % ointment Apply topically.      bran/gum/fib/steven/psyl/kelp/pec (FIBER 6 ORAL)       CHOLECALCIFEROL, VITAMIN D3, (VITAMIN D3 ORAL) Take by mouth.      diltiaZEM (CARDIZEM CD) 240 MG 24 hr capsule Take 1 capsule (240 mg total) by mouth once daily. 90 capsule 3    DULoxetine (CYMBALTA) 60 MG capsule Take 1 capsule (60 mg total) by mouth once daily. 30 capsule 11    etanercept (ENBREL SUBQ) Inject into the skin.      famotidine (PEPCID) 40 MG tablet Take 1 tablet (40 mg total) by mouth nightly as needed for Heartburn. 30 tablet 11    FLONASE ALLERGY RELIEF 50 mcg/actuation nasal spray       GI cocktail antac/dicyc/lidoc 30 ml  4 times a day  AC/HS PRN abdominal pain and burning 200 mL 0    HYDROcodone-acetaminophen (NORCO) 5-325 mg per tablet Take 1 tablet by mouth every 8 (eight) hours as needed for Pain. 12 tablet 0    magnesium oxide 200 mg magnesium Chew  Take by mouth.      meloxicam (MOBIC) 15 MG tablet Take 1 tablet (15 mg total) by mouth once daily. 30 tablet 0    methocarbamoL (ROBAXIN) 500 MG Tab Take 2 tablets (1,000 mg total) by mouth every evening. 60 tablet 1    multivit with calcium,iron,min (MULTIVITAMIN-CALCIUM AND IRON ORAL)       omeprazole (PRILOSEC) 40 MG capsule Take 1 capsule (40 mg total) by mouth every morning. 90 capsule 3    ondansetron (ZOFRAN) 4 MG tablet Take 4 mg by mouth every 8 (eight) hours as needed.      turmeric root extract 500 mg Cap       valACYclovir (VALTREX) 1000 MG tablet Take 1 tablet (1,000 mg total) by mouth every 8 (eight) hours. for 7 days 21 tablet 0     No current facility-administered medications on file prior to visit.     Social History     Socioeconomic History    Marital status:     Number of children: 3   Occupational History    Occupation: back in school for nutrition   Tobacco Use    Smoking status: Never    Smokeless tobacco: Never   Substance and Sexual Activity    Alcohol use: Yes     Comment: rarely    Drug use: No    Sexual activity: Yes     Partners: Male     Birth control/protection: None     Social Drivers of Health     Financial Resource Strain: Medium Risk (10/30/2024)    Overall Financial Resource Strain (CARDIA)     Difficulty of Paying Living Expenses: Somewhat hard   Food Insecurity: Food Insecurity Present (10/30/2024)    Hunger Vital Sign     Worried About Running Out of Food in the Last Year: Sometimes true     Ran Out of Food in the Last Year: Never true   Physical Activity: Sufficiently Active (10/30/2024)    Exercise Vital Sign     Days of Exercise per Week: 6 days     Minutes of Exercise per Session: 40 min   Stress: Stress Concern Present (10/30/2024)    [employer] - Occupational Stress Questionnaire     Feeling of Stress : To some extent   Housing Stability: High Risk (10/30/2024)    Housing Stability Vital Sign     Unable to Pay for Housing in the Last  "Year: Yes     Family History   Adopted: Yes   Problem Relation Name Age of Onset    Stroke Mother  53    Heart disease Maternal Grandmother      Eclampsia Neg Hx      Colon cancer Neg Hx          Review of Systems   Constitutional:  Positive for unexpected weight change (weight gain from autoimmune disease). Negative for activity change.   Cardiovascular:  Positive for palpitations.   Genitourinary:  Positive for menstrual problem.   Skin:  Negative for color change and wound.   Neurological:  Negative for syncope, light-headedness and headaches.   Psychiatric/Behavioral:  Negative for confusion.      Objective:   /84 (BP Location: Left arm, Patient Position: Sitting)   Pulse 109   Ht 5' 4" (1.626 m)   Wt 83 kg (183 lb)   LMP 02/13/2025 (Within Days)   SpO2 98%   BMI 31.41 kg/m²     Physical Exam   Constitutional: She is oriented to person, place, and time.   HENT:   Head: Normocephalic.   Eyes: Conjunctivae are normal. Pupils are equal, round, and reactive to light.   Cardiovascular:     Tachycardia present.         Pulmonary/Chest: Effort normal. Right breast exhibits mass and tenderness. Right breast exhibits no inverted nipple, no nipple discharge and no skin change. Left breast exhibits mass, nipple discharge and tenderness. Left breast exhibits no inverted nipple and no skin change.       Neurological: She is alert and oriented to person, place, and time.   Skin: Skin is warm and dry.     Psychiatric: Her behavior is normal. Mood normal.        Result:   Mammo Digital Screening Bilat w/ Todd     History:  Patient is 39 y.o. and is seen for encounter for screening mammogram for breast cancer.     Films Compared:  Prior images (if available) were compared.     Findings:  This procedure was performed using tomosynthesis. Computer-aided detection was utilized in the interpretation of this examination.        The breasts are heterogeneously dense, which may obscure small masses.      Left  There is a " mass seen in the left breast at 12 o'clock in the middle depth.      Right  There is no evidence of suspicious masses, calcifications, or other abnormal findings in the right breast.     Impression:  Left  Mass: Left breast mass at the middle depth and 12 o'clock. Assessment: 0 - Incomplete. Diagnostic Mammogram and/or Ultrasound is recommended.      Right  There is no mammographic evidence of malignancy in the right breast.     BI-RADS Category:   Overall: 0 - Incomplete: Needs Additional Imaging Evaluation        Recommendation:  Diagnostic mammogram with possible ultrasound (if indicated) is recommended.           Your estimated lifetime risk of breast cancer (to age 85) based on Tyrer-Cuzick risk assessment model is 12.74%.  According to the American Cancer Society, patients with a lifetime breast cancer risk of 20% or higher might benefit from supplemental screening tests, such as screening breast MRI.     Radiology review: Images personally reviewed by me in the clinic.   Result:   US Breast Bilateral Complete     History:  Patient is 39 y.o. and is seen for breast pain in female.     Films Compared:  Compared to: 10/10/2024 Mammo Digital Screening Bilat w/ Todd and 10/12/2023 Mammo Previous; prior left breast US 1/2/24, prior right breast US 10/19/22     Findings:  All 4 quadrants of each breast were imaged as well as the retroareolar regions.        Left  There is a 10 mm oval, parallel, hypoechoic mass with circumscribed margins seen in the upper inner quadrant of the left breast at 11 o'clock, 9 cm from the nipple.   There is a 6 mm round, hypoechoic mass with indistinct margins seen in the left breast at 10 o'clock, 3 cm from the nipple. Neither has a distinct mammographic correlate. Neither is seen on prior US images. Biopsy of both these findings is recommended.  There are additionally numerous bilateral simple cysts and cyst clusters, the largest at 12 o'clock, 2 cmfn, measuring 3.8 cm.      Right  There is no evidence of suspicious mass or other abnormal finding in the right breast. There are numerous simple cysts and cyst clusters; a benign cyst with fluid/debris level is noted at 12 o'clock, 4 cmfn, measuring 6 mm.     Impression:  Left  Mass: Left breast 10 mm mass at the upper inner position and 11 o'clock. Assessment: 4A - Suspicious finding. Ultrasound-guided biopsy is recommended.   Mass: Left breast 6 mm mass at 10 o'clock. Assessment: 4A - Suspicious finding. Ultrasound-guided biopsy is recommended.      Right  There is no sonographic evidence of malignancy in the right breast.     BI-RADS Category:   Overall: 4 - Suspicious    Recommendation:  Ultrasound-guided biopsy is recommended for the two solid-appearing masses in the upper inner left breast. This was discussed with the patient at the time of the exam.      Assessment:       1. Fibrocystic breast disease (FCBD), unspecified laterality    2. Mass of breast, unspecified laterality    3. Nipple discharge in female    4. Axillary mass, bilateral        Plan:   The axilla has never been evaluated on imaging, discussed possibility of ectopic breast tissue vs normal fatpad. Will obtain bilateral axillary US.     Interested in aspiration of the larger cyst - will arrange with radiology to have this done same day as US.  Will order TSH and prolactin for spontaneous discharge, mostly likely benign in nature.   3 month follow-up for evaluation of nipple discharge.   Needs 6 month mammogram after benign biopsy, due May 2025    Discussed continue with OTC therapies such as acetaminophen or nonsteroidal anti-inflammatory drugs (NSAIDs). They can both be used to relieve breast pain. Topical NSAIDS such as OTC Diclofenac (Volteran) gel can be used. Other recommendations include use of a supportive bra. Wearing a soft, supportive bra at night prevents the breasts from pulling down on the chest wall. Some women obtain relief from application of warm  compresses or ice packs. Also, referred for professional fitting of a supportive bra.      Discussed lifestyle recommendations such as decrease or elimination of caffeine. Also low-fat diet, high complex carbohydrate diet has been shown to be effective.       Alternative therapies such as Evening Primerose Oil (EPO) 1000mg twice daily has been found to be helpful for some patients, results are seen after 3-6 months.

## 2025-02-13 NOTE — Clinical Note
Shiv, can we get her scheduled for bilateral axillary US and set her up for breast cyst aspiration same day? She comes from out of town. She will also need 6 month biopsy follow-up in May.   Ese, can we get her scheduled for the lab work closer to home? And can you make her a 3 month f/u with me to assess nipple discharge? This can be correlated to the follow-up mammogram she needs in May.   Thanks! KB

## 2025-02-17 ENCOUNTER — PATIENT MESSAGE (OUTPATIENT)
Dept: SURGERY | Facility: CLINIC | Age: 40
End: 2025-02-17
Payer: OTHER GOVERNMENT

## 2025-02-17 ENCOUNTER — TELEPHONE (OUTPATIENT)
Dept: RADIOLOGY | Facility: HOSPITAL | Age: 40
End: 2025-02-17
Payer: OTHER GOVERNMENT

## 2025-02-17 NOTE — TELEPHONE ENCOUNTER
----- Message from George Aragon MD sent at 2/17/2025  1:04 PM CST -----  Good afternoon, Thank you for checking.  I think we have everything we need for this patient.Please set her up for bilateral axillary ultrasound and same-day ultrasound-guided cyst aspiration for the left breast.  Plan will be to drain the largest cyst within the left breast.  Thank you, George  ----- Message -----  From: Shiv Anderson LPN  Sent: 2/17/2025   9:03 AM CST  To: George Aragon MD; [address], Please see message below, pts last breast imaging was at [facility] 11/2024, should this patient be set up for additional imaging with the axilla breast ultrasounds and breast cyst aspiration,.  Thank you.  ----- Message -----  From: Yadi Daily PA-C  Sent: 2/14/2025   3:47 PM CST  To: Shiv Anderson LPN; Ese Chaney, can we get her scheduled for bilateral axillary US and set her up for breast cyst aspiration same day? She comes from out of town. She will also need 6 month biopsy follow-up in May.     Ese, can we get her scheduled for the lab work closer to home? And can you make her a 3 month f/u with me to assess nipple discharge? This can be correlated to the follow-up mammogram she needs in May.     Thanks!  KB

## 2025-02-17 NOTE — TELEPHONE ENCOUNTER
Spoke with patient. Reviewed breast cyst aspiration procedure and reviewed instructions for breast cyst aspiration. Patient expressed understanding and all questions were answered. Provided patient with my phone number to call for any further concerns or questions.   Patient scheduled breast ultrasound and breast cyst aspiration at the [facility] on 3/3/2025.

## 2025-02-18 ENCOUNTER — PATIENT MESSAGE (OUTPATIENT)
Dept: CARDIOLOGY | Facility: CLINIC | Age: 40
End: 2025-02-18
Payer: OTHER GOVERNMENT

## 2025-02-18 ENCOUNTER — RESULTS FOLLOW-UP (OUTPATIENT)
Dept: SURGERY | Facility: CLINIC | Age: 40
End: 2025-02-18

## 2025-02-18 ENCOUNTER — LAB VISIT (OUTPATIENT)
Dept: LAB | Facility: HOSPITAL | Age: 40
End: 2025-02-18
Attending: ORTHOPAEDIC SURGERY
Payer: OTHER GOVERNMENT

## 2025-02-18 DIAGNOSIS — N64.52 NIPPLE DISCHARGE IN FEMALE: ICD-10-CM

## 2025-02-18 DIAGNOSIS — N60.19 FIBROCYSTIC BREAST DISEASE (FCBD), UNSPECIFIED LATERALITY: Primary | ICD-10-CM

## 2025-02-18 LAB
PROLACTIN SERPL IA-MCNC: 11.4 NG/ML (ref 5.2–26.5)
TSH SERPL DL<=0.005 MIU/L-ACNC: 0.91 UIU/ML (ref 0.4–4)

## 2025-02-18 PROCEDURE — 36415 COLL VENOUS BLD VENIPUNCTURE: CPT | Performed by: ORTHOPAEDIC SURGERY

## 2025-02-18 PROCEDURE — 84146 ASSAY OF PROLACTIN: CPT | Performed by: ORTHOPAEDIC SURGERY

## 2025-02-18 PROCEDURE — 84443 ASSAY THYROID STIM HORMONE: CPT | Performed by: ORTHOPAEDIC SURGERY

## 2025-02-20 ENCOUNTER — CLINICAL SUPPORT (OUTPATIENT)
Dept: REHABILITATION | Facility: HOSPITAL | Age: 40
End: 2025-02-20
Payer: OTHER GOVERNMENT

## 2025-02-20 DIAGNOSIS — M54.2 NECK PAIN: Primary | ICD-10-CM

## 2025-02-20 PROCEDURE — 97140 MANUAL THERAPY 1/> REGIONS: CPT

## 2025-02-20 PROCEDURE — 97110 THERAPEUTIC EXERCISES: CPT

## 2025-02-20 NOTE — PROGRESS NOTES
[facility]   Physical Therapy Treatment Note       [facility]  [id number]    Therapy Diagnosis:   Encounter Diagnosis   Name Primary?    Neck pain Yes     Physician: Konstantin Linder*    Visit Date: 2/20/2025    Physician Orders: PT Eval and Treat   Medical Diagnosis:   M47.812 (ICD-10-CM) - Cervical spondylosis   M54.81 (ICD-10-CM) - Bilateral occipital neuralgia   M47.816 (ICD-10-CM) - Lumbar spondylosis   Evaluation Date: 12/5/2024  Authorization period Expiration: 12/31/2024  Plan of Care Expiration: 2/28/2025  Progress Note Due: 1/17/2025  Visit #/Visits authorized: 6/12   FOTO: 1/ 3         Precautions: Standard and chronic inflammation, occipital neuralgia   Date of Surgery: NA     PTA Visit #: 0/5     Time In: 2:00  Time Out: 2:55  Total Billable Time: 55 minutes  SUBJECTIVE      Pt reports: no new complaints  Response to previous treatment: no change  Functional change: none yet    Pain: 4/10  Location: right sided cervical and face (5/10 lumbar)    History of Current Condition: Laurie reports: 2 year history of symptoms, including global pain and achiness with recent exacerbation in neck and back. Back pain is bilateral but has more pain on the right side, including her right hip, knee and ankle. This does not seem to follow a neurological pathway, more joint pain. She reports neck pain to be worse than back at this time and would like to focus on the neck today. She states neck pain begins in suboccipital region and radiates down into the upper traps bilaterally. She states every morning she spends about an hour stretching then gets into the bath to relax her muscles before she can start her day. She has a history of tachycardia and is currently wearing a Holter monitor to track the arrhythmias. She has an autoimmune disorder, causing chronic inflammation and pain affecting her ability to perform her daily activities. She also reports  "daily headaches.    OBJECTIVE     Objective Measures updated at progress report unless specified.    Treatment   Laurie received the following manual therapy techniques for 30 minutes:   Grade II upper cervical side glides, rotational mobs  Grade II cervical posterior-anterior mobs  PROM cervical spine all planes except extension  Gentle soft tissue mobilization to bilateral cervical paravertebral muscles, bilateral suboccipitals, and bilateral upper traps  Manual stretching bilateral upper traps and levator scaps     Laurie performed the following therapeutic exercises for 25 minutes:    CROM in all planes x 5  Chin tucks x 10  Levator scapulae 10 x 10"  Head tilt stretch 10 x 10"  Scapular retractions 10 x 5"  Rowing RTB x 15  Cervical isometrics in all planes x 5    UBE Lvl 1 x 5 min  PPT x 10  LTR x 10  Modified crunch x 10  Open book x 10  Clams x 10  DKTC on SB x 10  SKTC on SB x 10  Hamstring stretch long seat 3 x 30"      Possible next visit   Deep neck flexors supine  Scap retractions  Prone W lift off/ wall W lift off  Supine horizontal abduction, yellow thera-band      Patient Education and Home Exercises     Home Exercises Provided and Patient Education Provided     Education provided:   - discussed hydration following soft tissue work  - monitor symptoms    Written Home Exercises Provided:  not yet .       ASSESSMENT      Performed gentle manual this date with rest breaks as needed. Patient with audible cavitation while performing PROM. Did increase pressure on paravertebral muscles this date, still very gentle with suboccipitals. Patient did demonstrate an inflammatory response during treatment, including redness around the area being worked and spreading to the anterior neck and chest. Patient reporting no other adverse effects during treatment.     Paste from lalo Marroquinie is a 39 y.o. female referred to outpatient physical therapy with a medical diagnosis of M47.812 (ICD-10-CM)- Cervical spondylosis, " M54.81 (ICD-10-CM)- Bilateral occipital neuralgia, M47.816 (ICD-10-CM)- Lumbar spondylosis, and presents to PT with pain limiting function, weakness in deep neck flexors, lower traps, and  bilaterally and general malaise. Patient to benefit from skilled therapy to prevent further decline in functional status. Patient with good tolerance to initial treatment which focused on gentle mobility of the cervical spine and surrounding soft tissues. All questions and concerns addressed.     Pt will continue to benefit from skilled outpatient physical therapy to address the deficits listed in the problem list box on initial evaluation, provide pt/family education and to maximize pt's level of independence in the home and community environment.     Goals:   Short Term Goals: 3 weeks  Demonstrate improvement in recent symptoms to progress toward long term goals  Correct postural deviations in sitting and standing to decrease pain and promote postural awareness for injury prevention.  Demonstrate compliance with initial exercise program     Long Term Goals: 6 weeks  Improve  strength by 5# bilaterally  Perform household and work tasks with minimal limitation  Report 50-75% decrease in frequency of headaches  Improve strength in mid back muscles and deep neck flexors by 1/2 MMT grade or better  Demonstrate independence with home exercise program to maintain gains made in therapy.    PLAN   Certification Period: 12/5/2024 to 2/28/2025.     Outpatient Physical Therapy 1-2 times weekly for 6 weeks to include the following interventions: patient education, Manual Therapy, Moist Heat/ Ice, Neuromuscular Re-ed, Patient Education, Self Care, Therapeutic Activities, Therapeutic Exercise, and Ultrasound.   Pt may be seen by PTA as part of the rehabilitation team.     Continue to progress per plan of care. Advance as tolerated     Elsa Burgess, PT

## 2025-03-03 ENCOUNTER — HOSPITAL ENCOUNTER (OUTPATIENT)
Dept: RADIOLOGY | Facility: HOSPITAL | Age: 40
Discharge: HOME | End: 2025-03-03
Attending: ORTHOPAEDIC SURGERY
Payer: OTHER GOVERNMENT

## 2025-03-03 DIAGNOSIS — R22.33 AXILLARY MASS, BILATERAL: ICD-10-CM

## 2025-03-03 PROCEDURE — 76642 ULTRASOUND BREAST LIMITED: CPT | Mod: TC,LT

## 2025-03-03 PROCEDURE — 76882 US LMTD JT/FCL EVL NVASC XTR: CPT | Mod: TC,RT

## 2025-03-03 PROCEDURE — 63600175 PHARM REV CODE 636 W HCPCS: Performed by: ORTHOPAEDIC SURGERY

## 2025-03-03 PROCEDURE — 76942 ECHO GUIDE FOR BIOPSY: CPT | Mod: TC

## 2025-03-03 RX ORDER — LIDOCAINE HYDROCHLORIDE 20 MG/ML
10 INJECTION, SOLUTION INFILTRATION; PERINEURAL ONCE
Status: COMPLETED | OUTPATIENT
Start: 2025-03-03 | End: 2025-03-03

## 2025-03-03 RX ADMIN — LIDOCAINE HYDROCHLORIDE 10 ML: 20 INJECTION, SOLUTION INFILTRATION; PERINEURAL at 03:03

## 2025-03-07 ENCOUNTER — HOSPITAL ENCOUNTER (OUTPATIENT)
Facility: HOSPITAL | Age: 40
Discharge: HOME | End: 2025-03-07
Attending: STUDENT IN AN ORGANIZED HEALTH CARE EDUCATION/TRAINING PROGRAM | Admitting: STUDENT IN AN ORGANIZED HEALTH CARE EDUCATION/TRAINING PROGRAM
Payer: OTHER GOVERNMENT

## 2025-03-07 DIAGNOSIS — M47.892 OTHER SPONDYLOSIS, CERVICAL REGION: ICD-10-CM

## 2025-03-07 LAB
B-HCG UR QL: NEGATIVE
CTP QC/QA: YES

## 2025-03-07 PROCEDURE — 99152 MOD SED SAME PHYS/QHP 5/>YRS: CPT | Mod: ,,, | Performed by: STUDENT IN AN ORGANIZED HEALTH CARE EDUCATION/TRAINING PROGRAM

## 2025-03-07 PROCEDURE — 25500020 PHARM REV CODE 255: Performed by: STUDENT IN AN ORGANIZED HEALTH CARE EDUCATION/TRAINING PROGRAM

## 2025-03-07 PROCEDURE — 64491 INJ PARAVERT F JNT C/T 2 LEV: CPT | Mod: 50,KX,, | Performed by: STUDENT IN AN ORGANIZED HEALTH CARE EDUCATION/TRAINING PROGRAM

## 2025-03-07 PROCEDURE — 64491 INJ PARAVERT F JNT C/T 2 LEV: CPT | Mod: 50 | Performed by: STUDENT IN AN ORGANIZED HEALTH CARE EDUCATION/TRAINING PROGRAM

## 2025-03-07 PROCEDURE — 99152 MOD SED SAME PHYS/QHP 5/>YRS: CPT | Performed by: STUDENT IN AN ORGANIZED HEALTH CARE EDUCATION/TRAINING PROGRAM

## 2025-03-07 PROCEDURE — 63600175 PHARM REV CODE 636 W HCPCS: Performed by: STUDENT IN AN ORGANIZED HEALTH CARE EDUCATION/TRAINING PROGRAM

## 2025-03-07 PROCEDURE — 81025 URINE PREGNANCY TEST: CPT | Performed by: STUDENT IN AN ORGANIZED HEALTH CARE EDUCATION/TRAINING PROGRAM

## 2025-03-07 PROCEDURE — 64490 INJ PARAVERT F JNT C/T 1 LEV: CPT | Mod: 50 | Performed by: STUDENT IN AN ORGANIZED HEALTH CARE EDUCATION/TRAINING PROGRAM

## 2025-03-07 PROCEDURE — 64490 INJ PARAVERT F JNT C/T 1 LEV: CPT | Mod: 50,KX,, | Performed by: STUDENT IN AN ORGANIZED HEALTH CARE EDUCATION/TRAINING PROGRAM

## 2025-03-07 RX ORDER — MIDAZOLAM HYDROCHLORIDE 1 MG/ML
INJECTION INTRAMUSCULAR; INTRAVENOUS
Status: DISCONTINUED | OUTPATIENT
Start: 2025-03-07 | End: 2025-03-07 | Stop reason: HOSPADM

## 2025-03-07 RX ORDER — LIDOCAINE HYDROCHLORIDE 10 MG/ML
1 INJECTION, SOLUTION EPIDURAL; INFILTRATION; INTRACAUDAL; PERINEURAL ONCE
Status: COMPLETED | OUTPATIENT
Start: 2025-03-07 | End: 2025-03-07

## 2025-03-07 RX ORDER — SODIUM CHLORIDE, SODIUM LACTATE, POTASSIUM CHLORIDE, CALCIUM CHLORIDE 600; 310; 30; 20 MG/100ML; MG/100ML; MG/100ML; MG/100ML
INJECTION, SOLUTION INTRAVENOUS CONTINUOUS
Status: DISCONTINUED | OUTPATIENT
Start: 2025-03-07 | End: 2025-03-07 | Stop reason: HOSPADM

## 2025-03-07 RX ORDER — LIDOCAINE HYDROCHLORIDE 20 MG/ML
INJECTION, SOLUTION EPIDURAL; INFILTRATION; INTRACAUDAL; PERINEURAL
Status: DISCONTINUED | OUTPATIENT
Start: 2025-03-07 | End: 2025-03-07 | Stop reason: HOSPADM

## 2025-03-07 RX ORDER — BUPIVACAINE HYDROCHLORIDE 2.5 MG/ML
INJECTION, SOLUTION EPIDURAL; INFILTRATION; INTRACAUDAL; PERINEURAL
Status: DISCONTINUED | OUTPATIENT
Start: 2025-03-07 | End: 2025-03-07 | Stop reason: HOSPADM

## 2025-03-07 RX ADMIN — LIDOCAINE HYDROCHLORIDE 0.2 MG: 10 INJECTION, SOLUTION EPIDURAL; INFILTRATION; INTRACAUDAL; PERINEURAL at 12:03

## 2025-03-07 NOTE — OP NOTE
Diagnostic Cervical Medial Branch Block under Fluoroscopy Guidance    The procedure, risks, benefits, and options were discussed with the patient. There are no contraindications to the procedure. The patent expressed understanding and agreed to the procedure. Informed written consent was obtained prior to the start of the procedure and can be found in the patient's chart.        PATIENT NAME: Luisa Jesus   MRN: [id number]     DATE OF PROCEDURE: 03/07/2025                                                             PROCEDURE:  Diagnostic Bilateral TON, C3, and C4 Cervical Medial Branch Block under Fluoroscopy Guidance    PRE-OP DIAGNOSIS: Cervical spondylosis without myelopathy [M47.812] Cervical Spondylosis [M47.812]    POST-OP DIAGNOSIS: Same    PHYSICIAN: Konstantin Linder MD    ASSISTANTS: none    MEDICATIONS INJECTED:  Bupivacaine 0.25%    SEDATION: Versed 2mg and Fentanyl 0mcg                                                                                                                                                                                     Conscious sedation ordered by M.D. Patient re-evaluation prior to administration of conscious sedation. No changes noted in patient's status from initial evaluation. The patient's vital signs were monitored by RN and patient remained hemodynamically stable throughout the procedure.    Event Time In   Sedation Start 1313   Sedation End 1331       ESTIMATED BLOOD LOSS:  None    COMPLICATIONS:  None.    INDICATIONS: Patient has clinical and imaging findings suggestive of facet mediated pain.    TECHNIQUE:   Time-out was performed to identify the patient and procedure to be performed. With the patient laying in a prone position, the surgical area was prepped and draped in the usual sterile fashion using ChloraPrep and fenestrated drape. The levels were determined under fluoroscopic guidance.   A 25 gauge, 3.5 inch needle was introduced to each level using AP,  lateral and/or contralateral oblique fluoroscopic imaging. After negative aspiration for blood or CSF was confirmed, 0.5 mL of the anesthetic listed above was then slowly injected at each site. The needles were removed and bleeding was nil. A sterile dressing was applied. No specimens collected. The patient tolerated the procedure well.    The patient was monitored after the procedure in the recovery area. They were given post-procedure and discharge instructions to follow at home. The patient was discharged in a stable condition.    Konstantin Linder MD

## 2025-03-07 NOTE — DISCHARGE SUMMARY
[facility]  Discharge Note  Short Stay     Admit Date: 3/7/2025    Discharge Date: 3/7/2025     Attending Physician: Konstantin Linder MD    Diagnoses:  Cervical spondylosis    Discharged Condition: Good     Hospital Course: Patient was admitted for an outpatient interventional pain management procedure and tolerated the procedure well with no complications.     Final Diagnoses: Same as principal problem.     Disposition: Home or Self Care     Follow up/Patient Instructions:   Follow-up in 4 weeks unless otherwise instructed. May return sooner as needed.       Reconciled Medications:     Medication List        CONTINUE taking these medications      aspirin 81 MG Chew     betamethasone dipropionate 0.05 % ointment  Commonly known as: BETANATE  Apply topically.     diltiaZEM 240 MG 24 hr capsule  Commonly known as: CARDIZEM CD  Take 1 capsule (240 mg total) by mouth once daily.     DULoxetine 60 MG capsule  Commonly known as: CYMBALTA  Take 1 capsule (60 mg total) by mouth once daily.     ENBREL SUBQ  Inject into the skin.     famotidine 40 MG tablet  Commonly known as: PEPCID  Take 1 tablet (40 mg total) by mouth nightly as needed for Heartburn.     FIBER 6 ORAL     FLONASE ALLERGY RELIEF 50 mcg/actuation nasal spray  Generic drug: fluticasone propionate     GI cocktail antac/dicyc/lidoc  30 ml  4 times a day  AC/HS PRN abdominal pain and burning     HYDROcodone-acetaminophen 5-325 mg per tablet  Commonly known as: NORCO  Take 1 tablet by mouth every 8 (eight) hours as needed for Pain.     magnesium oxide 200 mg magnesium Chew  Take by mouth.     meloxicam 15 MG tablet  Commonly known as: MOBIC  Take 1 tablet (15 mg total) by mouth once daily.     methocarbamoL 500 MG Tab  Commonly known as: Robaxin  Take 2 tablets (1,000 mg total) by mouth every evening.     MULTIVITAMIN-CALCIUM AND IRON ORAL     omeprazole 40 MG capsule  Commonly known as: PRILOSEC  Take 1 capsule (40 mg total) by mouth every  morning.     ondansetron 4 MG tablet  Commonly known as: ZOFRAN  Take 4 mg by mouth every 8 (eight) hours as needed.     turmeric root extract 500 mg Cap     TYLENOL ARTHRITIS PAIN ORAL  Take by mouth.     valACYclovir 1000 MG tablet  Commonly known as: VALTREX  Take 1 tablet (1,000 mg total) by mouth every 8 (eight) hours. for 7 days     VITAMIN D3 ORAL  Take by mouth.             Discharge Procedure Orders (must include Diet, Follow-up, Activity)   Ice to affected area   Order Comments: 20 minutes of ice or until area numb to the touch if area is sore 2-3 times per day as needed     No driving until:   Order Comments: Until following day     No dressing needed     Notify your health care provider if you experience any of the following:  temperature >100.4     Notify your health care provider if you experience any of the following:  persistent nausea and vomiting or diarrhea     Notify your health care provider if you experience any of the following:  severe uncontrolled pain     Notify your health care provider if you experience any of the following:  redness, tenderness, or signs of infection (pain, swelling, redness, odor or green/yellow discharge around incision site)     Notify your health care provider if you experience any of the following:  difficulty breathing or increased cough     Notify your health care provider if you experience any of the following:  severe persistent headache     Notify your health care provider if you experience any of the following:  worsening rash     Notify your health care provider if you experience any of the following:  persistent dizziness, light-headedness, or visual disturbances     Notify your health care provider if you experience any of the following:  increased confusion or weakness     Shower on day dressing removed (No bath)       Konstantin Linder MD  Interventional Pain Medicine / Physical Medicine & Rehabilitation

## 2025-03-07 NOTE — H&P
HPI  Patient presenting for Procedure(s) (LRB):  Block-nerve-medial branch-cervical c3, c4 and TON block (Bilateral)     Patient on Anti-coagulation No    No health changes since previous encounter. No changes in pain since procedure was scheduled at previous visit. Denies any fevers or infections. Denies any issues with clotting or bleeding.     No current facility-administered medications on file as of 3/7/2025.     Outpatient Medications as of 3/7/2025   Medication Sig Dispense Refill    aspirin 81 MG Chew       bran/gum/fib/steven/psyl/kelp/pec (FIBER 6 ORAL)       CHOLECALCIFEROL, VITAMIN D3, (VITAMIN D3 ORAL) Take by mouth.      diltiaZEM (CARDIZEM CD) 240 MG 24 hr capsule Take 1 capsule (240 mg total) by mouth once daily. 90 capsule 3    omeprazole (PRILOSEC) 40 MG capsule Take 1 capsule (40 mg total) by mouth every morning. 90 capsule 3    acetaminophen (TYLENOL ARTHRITIS PAIN ORAL) Take by mouth.      betamethasone dipropionate (DIPROLENE) 0.05 % ointment Apply topically.      DULoxetine (CYMBALTA) 60 MG capsule Take 1 capsule (60 mg total) by mouth once daily. 30 capsule 11    etanercept (ENBREL SUBQ) Inject into the skin.      famotidine (PEPCID) 40 MG tablet Take 1 tablet (40 mg total) by mouth nightly as needed for Heartburn. 30 tablet 11    FLONASE ALLERGY RELIEF 50 mcg/actuation nasal spray       GI cocktail antac/dicyc/lidoc 30 ml  4 times a day  AC/HS PRN abdominal pain and burning 200 mL 0    HYDROcodone-acetaminophen (NORCO) 5-325 mg per tablet Take 1 tablet by mouth every 8 (eight) hours as needed for Pain. 12 tablet 0    magnesium oxide 200 mg magnesium Chew Take by mouth.      meloxicam (MOBIC) 15 MG tablet Take 1 tablet (15 mg total) by mouth once daily. 30 tablet 0    methocarbamoL (ROBAXIN) 500 MG Tab Take 2 tablets (1,000 mg total) by mouth every evening. 60 tablet 1    multivit with calcium,iron,min (MULTIVITAMIN-CALCIUM AND IRON ORAL)       ondansetron (ZOFRAN) 4 MG tablet Take 4 mg by mouth  "every 8 (eight) hours as needed.      turmeric root extract 500 mg Cap        Past Medical History:   Diagnosis Date    Allergy     Atrial fibrillation 10/01/2023    aproxx    General anesthetics causing adverse effect in therapeutic use     IBS (irritable bowel syndrome)     SVT (supraventricular tachycardia)     Thrush 01/22/2015    recurrent     Past Surgical History:   Procedure Laterality Date    BREAST BIOPSY Left 11/2024    CHOLECYSTECTOMY      Dx Laparoscopy      presumed endometriosis    EPIDURAL STEROID INJECTION INTO LUMBAR SPINE N/A 11/15/2024    Procedure: Injection-steroid-epidural-lumbar;  Surgeon: Konstantin Linder MD;  Location: [facility] OR;  Service: Pain Management;  Laterality: N/A;    ESOPHAGOGASTRODUODENOSCOPY N/A 02/11/2025    Procedure: EGD (ESOPHAGOGASTRODUODENOSCOPY);  Surgeon: Brian Pavon MD;  Location: [facility] ENDO;  Service: Endoscopy;  Laterality: N/A;    SINUS SURGERY  01/01/2007    SPINE SURGERY      C spine    TONSILLECTOMY      VENTRICULAR ABLATION SURGERY      WISDOM TOOTH EXTRACTION       Review of patient's allergies indicates:   Allergen Reactions    Diclofenac sodium Other (See Comments)     Other reaction(s): Bloody stool    Ortho tri-cyclen (21) Hives    Kevzara [sarilumab]      [place] palsy type of reaction    Sulfa (sulfonamide antibiotics) Hives and Rash    Scopolamine     Sulfur Hives    Bactrim [sulfamethoxazole-trimethoprim] Rash    Tocilizumab Rash      No current facility-administered medications for this encounter.       PMHx, PSHx, Allergies, Medications reviewed in epic    ROS negative except pain complaints in HPI    OBJECTIVE:    Ht 5' 4" (1.626 m)   Wt 83 kg (182 lb 15.7 oz)   LMP 02/13/2025 (Within Days)   BMI 31.41 kg/m²     PHYSICAL EXAMINATION:    GENERAL: Well appearing, in no acute distress, alert and oriented.  PSYCH:  Mood and affect appropriate.  SKIN: Skin color, texture, turgor normal in procedure area, no rashes or lesions which will " impact the procedure.  CV: RRR with palpation of the radial artery.  PULM: No evidence of respiratory difficulty, symmetric chest rise. Clear to auscultation.  NEURO: Alert. Oriented. Speech fluent and appropriate. Moving all extremities.    Plan:    Proceed with procedure as planned Procedure(s) (LRB):  Block-nerve-medial branch-cervical c3, c4 and TON block (Bilateral)    Konstantin Linder  03/07/2025

## 2025-03-07 NOTE — PLAN OF CARE
Patient is being driven home by her son. Her pain diary was reviewed and is being sent home with her.

## 2025-03-11 VITALS
HEIGHT: 64 IN | RESPIRATION RATE: 18 BRPM | BODY MASS INDEX: 31.24 KG/M2 | TEMPERATURE: 99 F | SYSTOLIC BLOOD PRESSURE: 110 MMHG | DIASTOLIC BLOOD PRESSURE: 60 MMHG | OXYGEN SATURATION: 99 % | WEIGHT: 183 LBS | HEART RATE: 91 BPM

## 2025-03-13 ENCOUNTER — TELEPHONE (OUTPATIENT)
Dept: PAIN MEDICINE | Facility: CLINIC | Age: 40
End: 2025-03-13
Payer: OTHER GOVERNMENT

## 2025-03-27 ENCOUNTER — HOSPITAL ENCOUNTER (EMERGENCY)
Facility: HOSPITAL | Age: 40
Discharge: HOME | End: 2025-03-27
Attending: STUDENT IN AN ORGANIZED HEALTH CARE EDUCATION/TRAINING PROGRAM
Payer: OTHER GOVERNMENT

## 2025-03-27 VITALS
HEIGHT: 65 IN | SYSTOLIC BLOOD PRESSURE: 114 MMHG | RESPIRATION RATE: 16 BRPM | DIASTOLIC BLOOD PRESSURE: 59 MMHG | OXYGEN SATURATION: 100 % | BODY MASS INDEX: 29.16 KG/M2 | HEART RATE: 93 BPM | WEIGHT: 175 LBS | TEMPERATURE: 98 F

## 2025-03-27 DIAGNOSIS — R10.11 RIGHT UPPER QUADRANT ABDOMINAL PAIN: Primary | ICD-10-CM

## 2025-03-27 LAB
ABSOLUTE EOSINOPHIL (OHS): 0.28 K/UL
ABSOLUTE MONOCYTE (OHS): 0.67 K/UL (ref 0.3–1)
ABSOLUTE NEUTROPHIL COUNT (OHS): 4.54 K/UL (ref 1.8–7.7)
ALBUMIN SERPL BCP-MCNC: 3.7 G/DL (ref 3.5–5.2)
ALP SERPL-CCNC: 74 UNIT/L (ref 40–150)
ALT SERPL W/O P-5'-P-CCNC: 19 UNIT/L (ref 10–44)
ANION GAP (OHS): 8 MMOL/L (ref 8–16)
AST SERPL-CCNC: 21 UNIT/L (ref 11–45)
B-HCG UR QL: NEGATIVE
BASOPHILS # BLD AUTO: 0.03 K/UL
BASOPHILS NFR BLD AUTO: 0.4 %
BILIRUB SERPL-MCNC: 0.2 MG/DL (ref 0.1–1)
BILIRUB UR QL STRIP.AUTO: NEGATIVE
BUN SERPL-MCNC: 19 MG/DL (ref 6–20)
CALCIUM SERPL-MCNC: 8.7 MG/DL (ref 8.7–10.5)
CHLORIDE SERPL-SCNC: 104 MMOL/L (ref 95–110)
CLARITY UR: CLEAR
CO2 SERPL-SCNC: 26 MMOL/L (ref 23–29)
COLOR UR AUTO: YELLOW
CREAT SERPL-MCNC: 0.8 MG/DL (ref 0.5–1.4)
ERYTHROCYTE [DISTWIDTH] IN BLOOD BY AUTOMATED COUNT: 12.3 % (ref 11.5–14.5)
GFR SERPLBLD CREATININE-BSD FMLA CKD-EPI: >60 ML/MIN/1.73/M2
GLUCOSE SERPL-MCNC: 97 MG/DL (ref 70–110)
GLUCOSE UR QL STRIP: NEGATIVE
HCT VFR BLD AUTO: 36.3 % (ref 37–48.5)
HGB BLD-MCNC: 11.5 GM/DL (ref 12–16)
HGB UR QL STRIP: NEGATIVE
IMM GRANULOCYTES # BLD AUTO: 0.04 K/UL (ref 0–0.04)
IMM GRANULOCYTES NFR BLD AUTO: 0.5 % (ref 0–0.5)
KETONES UR QL STRIP: NEGATIVE
LEUKOCYTE ESTERASE UR QL STRIP: NEGATIVE
LIPASE SERPL-CCNC: 25 U/L (ref 4–60)
LYMPHOCYTES # BLD AUTO: 2.78 K/UL (ref 1–4.8)
MCH RBC QN AUTO: 28.3 PG (ref 27–50)
MCHC RBC AUTO-ENTMCNC: 31.7 G/DL (ref 32–36)
MCV RBC AUTO: 89 FL (ref 82–98)
NITRITE UR QL STRIP: NEGATIVE
NUCLEATED RBC (/100WBC) (OHS): 0 /100 WBC
PH UR STRIP: 7 [PH]
PLATELET # BLD AUTO: 317 K/UL (ref 150–450)
PMV BLD AUTO: 9.7 FL (ref 9.2–12.9)
POTASSIUM SERPL-SCNC: 4.5 MMOL/L (ref 3.5–5.1)
PROT SERPL-MCNC: 6.6 GM/DL (ref 6–8.4)
PROT UR QL STRIP: NEGATIVE
RBC # BLD AUTO: 4.06 M/UL (ref 4–5.4)
RELATIVE EOSINOPHIL (OHS): 3.4 %
RELATIVE LYMPHOCYTE (OHS): 33.3 % (ref 18–48)
RELATIVE MONOCYTE (OHS): 8 % (ref 4–15)
RELATIVE NEUTROPHIL (OHS): 54.4 % (ref 38–73)
SODIUM SERPL-SCNC: 138 MMOL/L (ref 136–145)
SP GR UR STRIP: 1.02
UROBILINOGEN UR STRIP-ACNC: NEGATIVE EU/DL
WBC # BLD AUTO: 8.34 K/UL (ref 3.9–12.7)

## 2025-03-27 PROCEDURE — 96375 TX/PRO/DX INJ NEW DRUG ADDON: CPT

## 2025-03-27 PROCEDURE — 81025 URINE PREGNANCY TEST: CPT | Performed by: STUDENT IN AN ORGANIZED HEALTH CARE EDUCATION/TRAINING PROGRAM

## 2025-03-27 PROCEDURE — 25500020 PHARM REV CODE 255: Performed by: STUDENT IN AN ORGANIZED HEALTH CARE EDUCATION/TRAINING PROGRAM

## 2025-03-27 PROCEDURE — 63600175 PHARM REV CODE 636 W HCPCS: Performed by: STUDENT IN AN ORGANIZED HEALTH CARE EDUCATION/TRAINING PROGRAM

## 2025-03-27 PROCEDURE — 96374 THER/PROPH/DIAG INJ IV PUSH: CPT

## 2025-03-27 PROCEDURE — 25000003 PHARM REV CODE 250: Performed by: STUDENT IN AN ORGANIZED HEALTH CARE EDUCATION/TRAINING PROGRAM

## 2025-03-27 PROCEDURE — 63600175 PHARM REV CODE 636 W HCPCS: Performed by: EMERGENCY MEDICINE

## 2025-03-27 PROCEDURE — 85025 COMPLETE CBC W/AUTO DIFF WBC: CPT | Performed by: STUDENT IN AN ORGANIZED HEALTH CARE EDUCATION/TRAINING PROGRAM

## 2025-03-27 PROCEDURE — 74177 CT ABD & PELVIS W/CONTRAST: CPT | Mod: TC

## 2025-03-27 PROCEDURE — 81003 URINALYSIS AUTO W/O SCOPE: CPT | Performed by: STUDENT IN AN ORGANIZED HEALTH CARE EDUCATION/TRAINING PROGRAM

## 2025-03-27 PROCEDURE — 83690 ASSAY OF LIPASE: CPT | Performed by: STUDENT IN AN ORGANIZED HEALTH CARE EDUCATION/TRAINING PROGRAM

## 2025-03-27 PROCEDURE — 99285 EMERGENCY DEPT VISIT HI MDM: CPT | Mod: 25

## 2025-03-27 PROCEDURE — 80053 COMPREHEN METABOLIC PANEL: CPT | Performed by: STUDENT IN AN ORGANIZED HEALTH CARE EDUCATION/TRAINING PROGRAM

## 2025-03-27 PROCEDURE — 74177 CT ABD & PELVIS W/CONTRAST: CPT | Mod: 26,,, | Performed by: RADIOLOGY

## 2025-03-27 PROCEDURE — 96361 HYDRATE IV INFUSION ADD-ON: CPT

## 2025-03-27 RX ORDER — ONDANSETRON HYDROCHLORIDE 2 MG/ML
4 INJECTION, SOLUTION INTRAVENOUS
Status: COMPLETED | OUTPATIENT
Start: 2025-03-27 | End: 2025-03-27

## 2025-03-27 RX ORDER — PROCHLORPERAZINE MALEATE 10 MG
10 TABLET ORAL EVERY 6 HOURS PRN
Qty: 15 TABLET | Refills: 0 | Status: SHIPPED | OUTPATIENT
Start: 2025-03-27

## 2025-03-27 RX ORDER — ONDANSETRON 8 MG/1
8 TABLET, ORALLY DISINTEGRATING ORAL 2 TIMES DAILY
Qty: 30 TABLET | Refills: 0 | Status: SHIPPED | OUTPATIENT
Start: 2025-03-27

## 2025-03-27 RX ORDER — MORPHINE SULFATE 2 MG/ML
6 INJECTION, SOLUTION INTRAMUSCULAR; INTRAVENOUS
Refills: 0 | Status: DISCONTINUED | OUTPATIENT
Start: 2025-03-27 | End: 2025-03-27 | Stop reason: HOSPADM

## 2025-03-27 RX ORDER — TRAMADOL HYDROCHLORIDE 50 MG/1
50 TABLET ORAL EVERY 6 HOURS PRN
Qty: 12 TABLET | Refills: 0 | Status: SHIPPED | OUTPATIENT
Start: 2025-03-27

## 2025-03-27 RX ORDER — PROCHLORPERAZINE EDISYLATE 5 MG/ML
5 INJECTION INTRAMUSCULAR; INTRAVENOUS ONCE
Status: COMPLETED | OUTPATIENT
Start: 2025-03-27 | End: 2025-03-27

## 2025-03-27 RX ADMIN — PROCHLORPERAZINE EDISYLATE 5 MG: 5 INJECTION INTRAMUSCULAR; INTRAVENOUS at 07:03

## 2025-03-27 RX ADMIN — ONDANSETRON 4 MG: 2 INJECTION INTRAMUSCULAR; INTRAVENOUS at 04:03

## 2025-03-27 RX ADMIN — IOHEXOL 75 ML: 350 INJECTION, SOLUTION INTRAVENOUS at 06:03

## 2025-03-27 RX ADMIN — SODIUM CHLORIDE 1000 ML: 9 INJECTION, SOLUTION INTRAVENOUS at 04:03

## 2025-03-27 NOTE — ED PROVIDER NOTES
Encounter Date: 3/27/2025       History     Chief Complaint   Patient presents with    Abdominal Pain     RUQ x several  .Nausea and weak today. Pain intensifying       40-year-old female with a history of tachycardic, lupus, rheumatoid arthritis.  She presents to ED with complaints of right upper quadrant pain that radiates to mid abdomen. Onset of pain >10 years ago, (she had a gallbladder removed yet pain persists). Pain progressively worsened over the past 2 weeks, is more severe today.  She reports associated bloated feeling, associated nausea.  Denies specific exacerbating or remitting factors.  She ate a sandwich for lunch this afternoon, she had a regular bowel movement today.     The history is provided by the patient. No  was used.     Review of patient's allergies indicates:   Allergen Reactions    Diclofenac sodium Other (See Comments)     Other reaction(s): Bloody stool    Ortho tri-cyclen (21) Hives    Kevzara [sarilumab]      [place] palsy type of reaction    Sulfa (sulfonamide antibiotics) Hives and Rash    Scopolamine     Sulfur Hives    Bactrim [sulfamethoxazole-trimethoprim] Rash    Tocilizumab Rash     Past Medical History:   Diagnosis Date    Allergy     Atrial fibrillation 10/01/2023    aproxx    General anesthetics causing adverse effect in therapeutic use     IBS (irritable bowel syndrome)     Rheumatoid arthritis, unspecified     SVT (supraventricular tachycardia)     Systemic lupus erythematosus, organ or system involvement unspecified     Thrush 01/22/2015    recurrent     Past Surgical History:   Procedure Laterality Date    BREAST BIOPSY Left 11/2024    CHOLECYSTECTOMY      Dx Laparoscopy      presumed endometriosis    EPIDURAL STEROID INJECTION INTO LUMBAR SPINE N/A 11/15/2024    Procedure: Injection-steroid-epidural-lumbar;  Surgeon: Konstantin Linder MD;  [facility];  Service: Pain Management;  Laterality: N/A;    ESOPHAGOGASTRODUODENOSCOPY N/A  02/11/2025    Procedure: EGD (ESOPHAGOGASTRODUODENOSCOPY);  Surgeon: Brian Pavon MD;  Location: [facility];  Service: Endoscopy;  Laterality: N/A;    INJECTION OF ANESTHETIC AGENT AROUND MEDIAL BRANCH NERVES INNERVATING CERVICAL FACET JOINT Bilateral 3/7/2025    Procedure: Block-nerve-medial branch-cervical;  Surgeon: Konstantin Linder MD;  Location: [facility] PAIN MANAGEMENT;  Service: Pain Management;  Laterality: Bilateral;    SINUS SURGERY  01/01/2007    SPINE SURGERY      C spine    TONSILLECTOMY      VENTRICULAR ABLATION SURGERY      WISDOM TOOTH EXTRACTION       Family History   Adopted: Yes   Problem Relation Name Age of Onset    Stroke Mother  53    Heart disease Maternal Grandmother      Eclampsia Neg Hx      Colon cancer Neg Hx       Social History[1]  Review of Systems   Constitutional: Negative.    HENT: Negative.     Eyes: Negative.    Respiratory: Negative.     Gastrointestinal:  Positive for abdominal pain.   Endocrine: Negative.    Genitourinary: Negative.    Musculoskeletal: Negative.    Skin: Negative.    Allergic/Immunologic: Negative.    Neurological: Negative.    Hematological: Negative.    Psychiatric/Behavioral: Negative.     All other systems reviewed and are negative.      Physical Exam     Initial Vitals [03/27/25 1547]   BP Pulse Resp Temp SpO2   139/78 104 20 98 °F (36.7 °C) 100 %      MAP       --         Physical Exam    Nursing note and vitals reviewed.  Constitutional: She appears well-developed.   HENT:   Head: Normocephalic.   Eyes: Pupils are equal, round, and reactive to light.   Neck:   Normal range of motion.  Pulmonary/Chest: Breath sounds normal.   Abdominal: Abdomen is soft. Bowel sounds are normal. There is abdominal tenderness (Right upper quadrant, epigastric,).   Rounded abdomen There is guarding.   Musculoskeletal:         General: Normal range of motion.      Cervical back: Normal range of motion.     Neurological: She is alert and oriented to person,  place, and time. GCS score is 15. GCS eye subscore is 4. GCS verbal subscore is 5. GCS motor subscore is 6.   Skin: Skin is warm. Capillary refill takes less than 2 seconds.         ED Course   Procedures  Labs Reviewed   CBC WITH DIFFERENTIAL - Abnormal       Result Value    WBC 8.34      RBC 4.06      HGB 11.5 (*)     HCT 36.3 (*)     MCV 89      MCH 28.3      MCHC 31.7 (*)     RDW 12.3      Platelet Count 317      MPV 9.7      Nucleated RBC 0      Neut % 54.4      Lymph % 33.3      Mono % 8.0      Eos % 3.4      Basophil % 0.4      Imm Grans % 0.5      Neut # 4.54      Lymph # 2.78      Mono # 0.67      Eos # 0.28      Baso # 0.03      Imm Grans # 0.04     URINALYSIS, REFLEX TO URINE CULTURE - Normal    Color, UA Yellow      Appearance, UA Clear      pH, UA 7.0      Spec Grav UA 1.025      Protein, UA Negative      Glucose, UA Negative      Ketones, UA Negative      Bilirubin, UA Negative      Blood, UA Negative      Nitrites, UA Negative      Urobilinogen, UA Negative      Leukocyte Esterase, UA Negative     PREGNANCY TEST, URINE RAPID - Normal    hCG Qualitative, Urine Negative     COMPREHENSIVE METABOLIC PANEL - Normal    Sodium 138      Potassium 4.5      Chloride 104      CO2 26      Glucose 97      BUN 19      Creatinine 0.8      Calcium 8.7      Protein Total 6.6      Albumin 3.7      Bilirubin Total 0.2      ALP 74      AST 21      ALT 19      Anion Gap 8      eGFR >60     LIPASE - Normal    Lipase Level 25     CBC W/ AUTO DIFFERENTIAL    Narrative:     The following orders were created for panel order CBC W/ AUTO DIFFERENTIAL.  Procedure                               Abnormality         Status                     ---------                               -----------         ------                     CBC with Differential[[id number]]       Abnormal            Final result                 Please view results for these tests on the individual orders.          Imaging Results              CT Abdomen Pelvis  With IV Contrast NO Oral Contrast (Final result)  Result time 03/27/25 19:51:36      Final result by Angie Valenzuela MD (03/27/25 19:51:36)                   Impression:      No acute findings.      Electronically signed by: Angie Valenzuela  Date:    03/27/2025  Time:    19:51               Narrative:    EXAMINATION:  CT ABDOMEN PELVIS WITH IV CONTRAST    CLINICAL HISTORY:  Abdominal pain, acute, nonlocalized;    TECHNIQUE:  Low dose axial images, sagittal and coronal reformations were obtained from the lung bases to the pubic symphysis following the IV administration of 75 mL of Omnipaque 350 .  Oral contrast was not given.    COMPARISON:  01/10/2025    FINDINGS:  Soft tissues: Unremarkable.    Bones: No acute osseous abnormality.    Lower chest: Normal heart size. No pericardial effusion.    Lung Bases: Well aerated, without consolidation or pleural fluid.    Liver: Normal in size and attenuation, with no focal hepatic lesions.    Gallbladder: Cholecystectomy.    Bile Ducts: No evidence of dilated ducts.    Pancreas: No mass or peripancreatic fat stranding.    Spleen: Unremarkable.    Adrenals: Unremarkable.    Kidneys/ Ureters: Unremarkable.    Bladder: No evidence of wall thickening.    Reproductive organs: Unremarkable.    GI Tract/Mesentery: No evidence of bowel obstruction or inflammation.    Peritoneal Space: No ascites. No free air.    Lymphadenopathy: No significant adenopathy.    Vasculature: No significant atherosclerosis or aneurysm.                                       Medications   morphine injection 6 mg (0 mg Intravenous Hold 3/27/25 1745)   sodium chloride 0.9% bolus 1,000 mL 1,000 mL (0 mLs Intravenous Stopped 3/27/25 1753)   ondansetron injection 4 mg (4 mg Intravenous Given 3/27/25 1653)   iohexoL (OMNIPAQUE 350) injection 75 mL (75 mLs Intravenous Given 3/27/25 1834)   prochlorperazine injection Soln 5 mg (5 mg Intravenous Given 3/27/25 1902)     Medical Decision Making  Ddx bowel  obstruction, mass, constipation, others  Abdominal workup pending at shift change.  Patient signed out to Dr. Mathias at 6:00 p.m.    All lab work and CT are within normal limits.  I discussed all these findings with the patient and answered her questions.  We will discharge patient with pain medicine nausea medicine have her follow up with the primary care provider.  Patient is in agreement with same.    Amount and/or Complexity of Data Reviewed  Labs: ordered.  Radiology: ordered.    Risk  Prescription drug management.                                      Clinical Impression:  Final diagnoses:  [R10.11] Right upper quadrant abdominal pain (Primary)          ED Disposition Condition    Discharge Stable          ED Prescriptions       Medication Sig Dispense Start Date End Date Auth. Provider    traMADoL (ULTRAM) 50 mg tablet Take 1 tablet (50 mg total) by mouth every 6 (six) hours as needed for Pain. 12 tablet 3/27/2025 -- Joshua Mathias DO    ondansetron (ZOFRAN-ODT) 8 MG TbDL Take 1 tablet (8 mg total) by mouth 2 (two) times daily. 30 tablet 3/27/2025 -- Joshua Mathias DO          Follow-up Information       Follow up With Specialties Details Why Contact Info    Primary care physician of choice  Schedule an appointment as soon as possible for a visit                    [1]   Social History  Tobacco Use    Smoking status: Never    Smokeless tobacco: Never   Substance Use Topics    Alcohol use: Yes     Comment: rarely    Drug use: No        Joshua Mathias DO  03/27/25 2010

## 2025-03-27 NOTE — ED TRIAGE NOTES
"Present with c/o " on my upper right I been having a lot of pain, extreme bloated and it's like over my rib how I fell, extreme nausea and I'm taking Zofran and it's not helping" symptoms off and no since approx 1 wk,     C/o RUQ pain 7/10, denies vomiting   "

## 2025-03-31 DIAGNOSIS — G44.86 CERVICOGENIC HEADACHE: ICD-10-CM

## 2025-04-01 ENCOUNTER — TELEPHONE (OUTPATIENT)
Dept: GASTROENTEROLOGY | Facility: CLINIC | Age: 40
End: 2025-04-01
Payer: OTHER GOVERNMENT

## 2025-04-01 ENCOUNTER — HOSPITAL ENCOUNTER (OUTPATIENT)
Dept: RADIOLOGY | Facility: HOSPITAL | Age: 40
Discharge: HOME | End: 2025-04-01
Payer: OTHER GOVERNMENT

## 2025-04-01 ENCOUNTER — RESULTS FOLLOW-UP (OUTPATIENT)
Dept: GASTROENTEROLOGY | Facility: CLINIC | Age: 40
End: 2025-04-01
Payer: OTHER GOVERNMENT

## 2025-04-01 DIAGNOSIS — R10.11 RIGHT UPPER QUADRANT ABDOMINAL PAIN: ICD-10-CM

## 2025-04-01 DIAGNOSIS — R10.11 RIGHT UPPER QUADRANT ABDOMINAL PAIN: Primary | ICD-10-CM

## 2025-04-01 PROCEDURE — 74018 RADEX ABDOMEN 1 VIEW: CPT | Mod: TC

## 2025-04-01 PROCEDURE — 74018 RADEX ABDOMEN 1 VIEW: CPT | Mod: 26,,, | Performed by: RADIOLOGY

## 2025-04-01 RX ORDER — METHOCARBAMOL 500 MG/1
TABLET, FILM COATED ORAL
Qty: 60 TABLET | Refills: 5 | Status: SHIPPED | OUTPATIENT
Start: 2025-04-01

## 2025-04-01 NOTE — TELEPHONE ENCOUNTER
----- Message from Kristen sent at 4/1/2025  8:29 AM CDT -----  Contact: pt [phone]  Type:  Same Day Appointment RequestCaller is requesting a same day appointment.  Caller declined first available appointment listed below.  Name of Caller:  Pt When is the first available appointment?  NA Symptoms:  BloatingBest Call Back Number:  [phone] Information:   Pt requesting VV if possible. Pt went to [facility] ED last week due to bloating and pain, had a ct done but nothing emergent was found per pt. Pls call back and advise

## 2025-04-01 NOTE — TELEPHONE ENCOUNTER
I reviewed all of her imaging and ER visit. No fluid noted on the CT. When she comes in we can plan to do further testing and imaging (such as x-ray and ultrasound) and possibly a colonoscopy. I am happy to place the order for the x-ray prior to appointment if she would like.

## 2025-04-01 NOTE — TELEPHONE ENCOUNTER
Call placed to mode Dupree in regards to message received. I advised her we do not do virtual visits, but I can make her an in office visit. She stated that's fine she just needs to know what to do about the pain in her abd and the bloating she is having. She stated if feels like there is fluid in there but it is also hard at the same time. I advised her I would send a message to Padmini for her to review her chart and give recommendations. She verbalized understanding. No further issues noted.

## 2025-04-04 ENCOUNTER — HOSPITAL ENCOUNTER (OUTPATIENT)
Facility: HOSPITAL | Age: 40
Discharge: HOME | End: 2025-04-04
Attending: STUDENT IN AN ORGANIZED HEALTH CARE EDUCATION/TRAINING PROGRAM | Admitting: STUDENT IN AN ORGANIZED HEALTH CARE EDUCATION/TRAINING PROGRAM
Payer: OTHER GOVERNMENT

## 2025-04-04 DIAGNOSIS — M47.892 OTHER SPONDYLOSIS, CERVICAL REGION: ICD-10-CM

## 2025-04-04 LAB
B-HCG UR QL: NEGATIVE
CTP QC/QA: YES

## 2025-04-04 PROCEDURE — 99152 MOD SED SAME PHYS/QHP 5/>YRS: CPT | Performed by: STUDENT IN AN ORGANIZED HEALTH CARE EDUCATION/TRAINING PROGRAM

## 2025-04-04 PROCEDURE — 63600175 PHARM REV CODE 636 W HCPCS: Performed by: STUDENT IN AN ORGANIZED HEALTH CARE EDUCATION/TRAINING PROGRAM

## 2025-04-04 PROCEDURE — 64490 INJ PARAVERT F JNT C/T 1 LEV: CPT | Mod: 50,KX,, | Performed by: STUDENT IN AN ORGANIZED HEALTH CARE EDUCATION/TRAINING PROGRAM

## 2025-04-04 PROCEDURE — 64490 INJ PARAVERT F JNT C/T 1 LEV: CPT | Mod: 50 | Performed by: STUDENT IN AN ORGANIZED HEALTH CARE EDUCATION/TRAINING PROGRAM

## 2025-04-04 PROCEDURE — 64491 INJ PARAVERT F JNT C/T 2 LEV: CPT | Mod: 50,KX,, | Performed by: STUDENT IN AN ORGANIZED HEALTH CARE EDUCATION/TRAINING PROGRAM

## 2025-04-04 PROCEDURE — 25500020 PHARM REV CODE 255: Performed by: STUDENT IN AN ORGANIZED HEALTH CARE EDUCATION/TRAINING PROGRAM

## 2025-04-04 PROCEDURE — 64491 INJ PARAVERT F JNT C/T 2 LEV: CPT | Mod: 50 | Performed by: STUDENT IN AN ORGANIZED HEALTH CARE EDUCATION/TRAINING PROGRAM

## 2025-04-04 PROCEDURE — 81025 URINE PREGNANCY TEST: CPT | Performed by: STUDENT IN AN ORGANIZED HEALTH CARE EDUCATION/TRAINING PROGRAM

## 2025-04-04 RX ORDER — MIDAZOLAM HYDROCHLORIDE 1 MG/ML
INJECTION INTRAMUSCULAR; INTRAVENOUS
Status: DISCONTINUED | OUTPATIENT
Start: 2025-04-04 | End: 2025-04-04 | Stop reason: HOSPADM

## 2025-04-04 RX ORDER — LIDOCAINE HYDROCHLORIDE 10 MG/ML
1 INJECTION, SOLUTION EPIDURAL; INFILTRATION; INTRACAUDAL; PERINEURAL ONCE
Status: DISCONTINUED | OUTPATIENT
Start: 2025-04-04 | End: 2025-04-04 | Stop reason: HOSPADM

## 2025-04-04 RX ORDER — ONDANSETRON HYDROCHLORIDE 2 MG/ML
4 INJECTION, SOLUTION INTRAVENOUS ONCE
Status: COMPLETED | OUTPATIENT
Start: 2025-04-04 | End: 2025-04-04

## 2025-04-04 RX ORDER — BUPIVACAINE HYDROCHLORIDE 2.5 MG/ML
INJECTION, SOLUTION EPIDURAL; INFILTRATION; INTRACAUDAL; PERINEURAL
Status: DISCONTINUED | OUTPATIENT
Start: 2025-04-04 | End: 2025-04-04 | Stop reason: HOSPADM

## 2025-04-04 RX ORDER — SODIUM CHLORIDE, SODIUM LACTATE, POTASSIUM CHLORIDE, CALCIUM CHLORIDE 600; 310; 30; 20 MG/100ML; MG/100ML; MG/100ML; MG/100ML
INJECTION, SOLUTION INTRAVENOUS CONTINUOUS
Status: DISCONTINUED | OUTPATIENT
Start: 2025-04-04 | End: 2025-04-04 | Stop reason: HOSPADM

## 2025-04-04 RX ORDER — LIDOCAINE HYDROCHLORIDE 20 MG/ML
INJECTION, SOLUTION EPIDURAL; INFILTRATION; INTRACAUDAL; PERINEURAL
Status: DISCONTINUED | OUTPATIENT
Start: 2025-04-04 | End: 2025-04-04 | Stop reason: HOSPADM

## 2025-04-04 RX ADMIN — ONDANSETRON 4 MG: 2 INJECTION INTRAMUSCULAR; INTRAVENOUS at 08:04

## 2025-04-04 NOTE — PLAN OF CARE
Reviewed AVS. Verbalized understanding. Denies further questions , needs or complaints. Able to hover BLE above bed for 15 seconds. Home via private vehicle. Ambulates well.

## 2025-04-04 NOTE — H&P
HPI  Patient presenting for Procedure(s) (LRB):  Block-nerve-medial branch-cervical c3/4 mbb #2 and TON (Bilateral)     Patient on Anti-coagulation No    No health changes since previous encounter. No changes in pain since procedure was scheduled at previous visit. Denies any fevers or infections. Denies any issues with clotting or bleeding.     Facility-Administered Medications as of 4/4/2025   Medication Dose Route Frequency Provider Last Rate Last Admin    lactated ringers infusion   Intravenous Continuous Konstantin Linder MD        LIDOcaine (PF) 10 mg/ml (1%) injection 10 mg  1 mL Intradermal Once Konstantin Linder MD         Outpatient Medications as of 4/4/2025   Medication Sig Dispense Refill    acetaminophen (TYLENOL ARTHRITIS PAIN ORAL) Take by mouth.      aspirin 81 MG Chew       betamethasone dipropionate (DIPROLENE) 0.05 % ointment Apply topically.      bran/gum/fib/steven/psyl/kelp/pec (FIBER 6 ORAL)       CHOLECALCIFEROL, VITAMIN D3, (VITAMIN D3 ORAL) Take by mouth.      diltiaZEM (CARDIZEM CD) 240 MG 24 hr capsule Take 1 capsule (240 mg total) by mouth once daily. 90 capsule 3    DULoxetine (CYMBALTA) 60 MG capsule Take 1 capsule (60 mg total) by mouth once daily. 30 capsule 11    etanercept (ENBREL SUBQ) Inject into the skin.      famotidine (PEPCID) 40 MG tablet Take 1 tablet (40 mg total) by mouth nightly as needed for Heartburn. 30 tablet 11    FLONASE ALLERGY RELIEF 50 mcg/actuation nasal spray       GI cocktail antac/dicyc/lidoc 30 ml  4 times a day  AC/HS PRN abdominal pain and burning 200 mL 0    magnesium oxide 200 mg magnesium Chew Take by mouth.      meloxicam (MOBIC) 15 MG tablet Take 1 tablet (15 mg total) by mouth once daily. 30 tablet 0    multivit with calcium,iron,min (MULTIVITAMIN-CALCIUM AND IRON ORAL)       omeprazole (PRILOSEC) 40 MG capsule Take 1 capsule (40 mg total) by mouth every morning. 90 capsule 3    ondansetron (ZOFRAN) 4 MG tablet Take 4 mg by mouth every 8  (eight) hours as needed.      turmeric root extract 500 mg Cap        Past Medical History:   Diagnosis Date    Allergy     Atrial fibrillation 10/01/2023    aproxx    General anesthetics causing adverse effect in therapeutic use     IBS (irritable bowel syndrome)     Rheumatoid arthritis, unspecified     SVT (supraventricular tachycardia)     Systemic lupus erythematosus, organ or system involvement unspecified     Thrush 01/22/2015    recurrent     Past Surgical History:   Procedure Laterality Date    BREAST BIOPSY Left 11/2024    CHOLECYSTECTOMY      Dx Laparoscopy      presumed endometriosis    EPIDURAL STEROID INJECTION INTO LUMBAR SPINE N/A 11/15/2024    Procedure: Injection-steroid-epidural-lumbar;  Surgeon: Konstantin Linder MD;  [facility];  Service: Pain Management;  Laterality: N/A;    ESOPHAGOGASTRODUODENOSCOPY N/A 02/11/2025    Procedure: EGD (ESOPHAGOGASTRODUODENOSCOPY);  Surgeon: Brian Pavon MD;  Location: [facility] ENDO;  Service: Endoscopy;  Laterality: N/A;    INJECTION OF ANESTHETIC AGENT AROUND MEDIAL BRANCH NERVES INNERVATING CERVICAL FACET JOINT Bilateral 3/7/2025    Procedure: Block-nerve-medial branch-cervical;  Surgeon: Konstantin Linder MD;  Location: [facility] PAIN [facility]NAGEMENT;  Service: Pain Management;  Laterality: Bilateral;    SINUS SURGERY  01/01/2007    SPINE SURGERY      C spine    TONSILLECTOMY      VENTRICULAR ABLATION SURGERY      WISDOM TOOTH EXTRACTION       Review of patient's allergies indicates:   Allergen Reactions    Diclofenac sodium Other (See Comments)     Other reaction(s): Bloody stool    Ortho tri-cyclen (21) Hives    Kevzara [sarilumab]      [place] palsy type of reaction    Sulfa (sulfonamide antibiotics) Hives and Rash    Scopolamine     Sulfur Hives    Bactrim [sulfamethoxazole-trimethoprim] Rash    Tocilizumab Rash      Current Facility-Administered Medications   Medication    lactated ringers infusion    LIDOcaine (PF) 10 mg/ml (1%)  injection 10 mg       PMHx, PSHx, Allergies, Medications reviewed in epic    ROS negative except pain complaints in HPI    OBJECTIVE:    /81 (BP Location: Left arm, Patient Position: Sitting)   Pulse 90   Temp 98.2 °F (36.8 °C) (Skin)   Resp 20   Wt 79.4 kg (175 lb 0.7 oz)   LMP 03/21/2025 (Exact Date)   SpO2 99%   Breastfeeding No   BMI 29.13 kg/m²     PHYSICAL EXAMINATION:    GENERAL: Well appearing, in no acute distress, alert and oriented.  PSYCH:  Mood and affect appropriate.  SKIN: Skin color, texture, turgor normal in procedure area, no rashes or lesions which will impact the procedure.  CV: RRR with palpation of the radial artery.  PULM: No evidence of respiratory difficulty, symmetric chest rise. Clear to auscultation.  NEURO: Alert. Oriented. Speech fluent and appropriate. Moving all extremities.    Plan:    Proceed with procedure as planned Procedure(s) (LRB):  Block-nerve-medial branch-cervical c3/4 mbb #2 and TON (Bilateral)    Konstantin Linder  04/04/2025

## 2025-04-04 NOTE — OP NOTE
Diagnostic Cervical Medial Branch Block under Fluoroscopy Guidance    The procedure, risks, benefits, and options were discussed with the patient. There are no contraindications to the procedure. The patent expressed understanding and agreed to the procedure. Informed written consent was obtained prior to the start of the procedure and can be found in the patient's chart.        PATIENT NAME: Luisa Jesus   MRN: [id number]     DATE OF PROCEDURE: 04/04/2025                                                             PROCEDURE:  Diagnostic Bilateral TON, C3, and C4 Cervical Medial Branch Block under Fluoroscopy Guidance    PRE-OP DIAGNOSIS: Cervical spondylosis without myelopathy [M47.812] Cervical Spondylosis [M47.812]    POST-OP DIAGNOSIS: Same    PHYSICIAN: Konstantin Linder MD    ASSISTANTS: none    MEDICATIONS INJECTED:  Bupivacaine 0.25%    SEDATION: Versed 2mg and Fentanyl 0mcg                                                                                                                                                                                     Conscious sedation ordered by M.D. Patient re-evaluation prior to administration of conscious sedation. No changes noted in patient's status from initial evaluation. The patient's vital signs were monitored by RN and patient remained hemodynamically stable throughout the procedure.    Event Time In   Sedation Start 0832   Sedation End 0853       ESTIMATED BLOOD LOSS:  None    COMPLICATIONS:  None.    INDICATIONS: Patient has clinical and imaging findings suggestive of facet mediated pain. Patient had a previous diagnostic block performed with at least 80% relief in pain and/or at least 50% improvement in the ability to perform previously painful movements and ADLs for the expected duration of the local anesthetic utilized.    TECHNIQUE:   Time-out was performed to identify the patient and procedure to be performed. With the patient laying in a prone  position, the surgical area was prepped and draped in the usual sterile fashion using ChloraPrep and fenestrated drape. The levels were determined under fluoroscopic guidance.  A 25 gauge, 3.5 inch needle was introduced to each level using AP, lateral and/or contralateral oblique fluoroscopic imaging. After negative aspiration for blood or CSF was confirmed, 0.5 mL of the anesthetic listed above was then slowly injected at each site. The needles were removed and bleeding was nil. A sterile dressing was applied. No specimens collected. The patient tolerated the procedure well.    The patient was monitored after the procedure in the recovery area. They were given post-procedure and discharge instructions to follow at home. The patient was discharged in a stable condition.    Konstantin Linder MD

## 2025-04-04 NOTE — DISCHARGE SUMMARY
[facility]  Discharge Note  Short Stay     Admit Date: 4/4/2025    Discharge Date: 4/4/2025     Attending Physician: Konstantin Linder MD    Diagnoses:  Cervical spondylosis    Discharged Condition: Good     Hospital Course: Patient was admitted for an outpatient interventional pain management procedure and tolerated the procedure well with no complications.     Final Diagnoses: Same as principal problem.     Disposition: Home or Self Care     Follow up/Patient Instructions:   Follow-up in 4 weeks unless otherwise instructed. May return sooner as needed.       Reconciled Medications:     Medication List        CONTINUE taking these medications      aspirin 81 MG Chew     betamethasone dipropionate 0.05 % ointment  Commonly known as: BETANATE  Apply topically.     diltiaZEM 240 MG 24 hr capsule  Commonly known as: CARDIZEM CD  Take 1 capsule (240 mg total) by mouth once daily.     DULoxetine 60 MG capsule  Commonly known as: CYMBALTA  Take 1 capsule (60 mg total) by mouth once daily.     ENBREL SUBQ  Inject into the skin.     famotidine 40 MG tablet  Commonly known as: PEPCID  Take 1 tablet (40 mg total) by mouth nightly as needed for Heartburn.     FIBER 6 ORAL     FLONASE ALLERGY RELIEF 50 mcg/actuation nasal spray  Generic drug: fluticasone propionate     GI cocktail antac/dicyc/lidoc  30 ml  4 times a day  AC/HS PRN abdominal pain and burning     magnesium oxide 200 mg magnesium Chew  Take by mouth.     meloxicam 15 MG tablet  Commonly known as: MOBIC  Take 1 tablet (15 mg total) by mouth once daily.     methocarbamoL 500 MG Tab  Commonly known as: Robaxin  TAKE 2 TABLETS(1000 MG) BY MOUTH EVERY EVENING     MULTIVITAMIN-CALCIUM AND IRON ORAL     omeprazole 40 MG capsule  Commonly known as: PRILOSEC  Take 1 capsule (40 mg total) by mouth every morning.     ondansetron 4 MG tablet  Commonly known as: ZOFRAN  Take 4 mg by mouth every 8 (eight) hours as needed.     ondansetron 8 MG Tbdl  Commonly known  as: ZOFRAN-ODT  Take 1 tablet (8 mg total) by mouth 2 (two) times daily.     prochlorperazine 10 MG tablet  Commonly known as: COMPAZINE  Take 1 tablet (10 mg total) by mouth every 6 (six) hours as needed.     traMADoL 50 mg tablet  Commonly known as: ULTRAM  Take 1 tablet (50 mg total) by mouth every 6 (six) hours as needed for Pain.     turmeric root extract 500 mg Cap     TYLENOL ARTHRITIS PAIN ORAL  Take by mouth.     valACYclovir 1000 MG tablet  Commonly known as: VALTREX  Take 1 tablet (1,000 mg total) by mouth every 8 (eight) hours. for 7 days     VITAMIN D3 ORAL  Take by mouth.             Discharge Procedure Orders (must include Diet, Follow-up, Activity)   Ice to affected area   Order Comments: 20 minutes of ice or until area numb to the touch if area is sore 2-3 times per day as needed     No driving until:   Order Comments: Until following day     No dressing needed     Notify your health care provider if you experience any of the following:  temperature >100.4     Notify your health care provider if you experience any of the following:  persistent nausea and vomiting or diarrhea     Notify your health care provider if you experience any of the following:  severe uncontrolled pain     Notify your health care provider if you experience any of the following:  redness, tenderness, or signs of infection (pain, swelling, redness, odor or green/yellow discharge around incision site)     Notify your health care provider if you experience any of the following:  difficulty breathing or increased cough     Notify your health care provider if you experience any of the following:  severe persistent headache     Notify your health care provider if you experience any of the following:  worsening rash     Notify your health care provider if you experience any of the following:  persistent dizziness, light-headedness, or visual disturbances     Notify your health care provider if you experience any of the following:   increased confusion or weakness     Shower on day dressing removed (No bath)       Konstantin Linder MD  Interventional Pain Medicine / Physical Medicine & Rehabilitation

## 2025-04-07 ENCOUNTER — HOSPITAL ENCOUNTER (OUTPATIENT)
Dept: RADIOLOGY | Facility: HOSPITAL | Age: 40
Discharge: HOME | End: 2025-04-07
Payer: OTHER GOVERNMENT

## 2025-04-07 ENCOUNTER — RESULTS FOLLOW-UP (OUTPATIENT)
Dept: GASTROENTEROLOGY | Facility: CLINIC | Age: 40
End: 2025-04-07
Payer: OTHER GOVERNMENT

## 2025-04-07 ENCOUNTER — TELEPHONE (OUTPATIENT)
Dept: PAIN MEDICINE | Facility: CLINIC | Age: 40
End: 2025-04-07
Payer: OTHER GOVERNMENT

## 2025-04-07 VITALS
SYSTOLIC BLOOD PRESSURE: 117 MMHG | WEIGHT: 175.06 LBS | HEART RATE: 87 BPM | DIASTOLIC BLOOD PRESSURE: 80 MMHG | OXYGEN SATURATION: 100 % | BODY MASS INDEX: 29.13 KG/M2 | TEMPERATURE: 98 F | RESPIRATION RATE: 18 BRPM

## 2025-04-07 DIAGNOSIS — R10.11 RIGHT UPPER QUADRANT ABDOMINAL PAIN: ICD-10-CM

## 2025-04-07 DIAGNOSIS — M47.812 CERVICAL SPONDYLOSIS WITHOUT MYELOPATHY: Primary | ICD-10-CM

## 2025-04-07 DIAGNOSIS — R10.31 RIGHT LOWER QUADRANT ABDOMINAL PAIN: ICD-10-CM

## 2025-04-07 DIAGNOSIS — K59.04 CHRONIC IDIOPATHIC CONSTIPATION: Primary | ICD-10-CM

## 2025-04-07 PROCEDURE — 74018 RADEX ABDOMEN 1 VIEW: CPT | Mod: TC

## 2025-04-07 PROCEDURE — 74018 RADEX ABDOMEN 1 VIEW: CPT | Mod: 26,,, | Performed by: RADIOLOGY

## 2025-04-09 ENCOUNTER — RESULTS FOLLOW-UP (OUTPATIENT)
Dept: GASTROENTEROLOGY | Facility: CLINIC | Age: 40
End: 2025-04-09

## 2025-04-09 ENCOUNTER — HOSPITAL ENCOUNTER (OUTPATIENT)
Dept: RADIOLOGY | Facility: HOSPITAL | Age: 40
Discharge: HOME | End: 2025-04-09
Payer: OTHER GOVERNMENT

## 2025-04-09 ENCOUNTER — OFFICE VISIT (OUTPATIENT)
Dept: GASTROENTEROLOGY | Facility: CLINIC | Age: 40
End: 2025-04-09
Payer: OTHER GOVERNMENT

## 2025-04-09 VITALS
WEIGHT: 192 LBS | HEART RATE: 98 BPM | SYSTOLIC BLOOD PRESSURE: 138 MMHG | HEIGHT: 65 IN | BODY MASS INDEX: 31.99 KG/M2 | DIASTOLIC BLOOD PRESSURE: 86 MMHG

## 2025-04-09 DIAGNOSIS — K59.04 CHRONIC IDIOPATHIC CONSTIPATION: ICD-10-CM

## 2025-04-09 DIAGNOSIS — R10.11 RIGHT UPPER QUADRANT ABDOMINAL PAIN: ICD-10-CM

## 2025-04-09 PROCEDURE — 74018 RADEX ABDOMEN 1 VIEW: CPT | Mod: 26,,, | Performed by: RADIOLOGY

## 2025-04-09 PROCEDURE — 74018 RADEX ABDOMEN 1 VIEW: CPT | Mod: TC

## 2025-04-09 PROCEDURE — 99214 OFFICE O/P EST MOD 30 MIN: CPT | Mod: S$PBB,,,

## 2025-04-09 PROCEDURE — 99214 OFFICE O/P EST MOD 30 MIN: CPT | Mod: PBBFAC,PN

## 2025-04-09 PROCEDURE — 99999 PR PBB SHADOW E&M-EST. PATIENT-LVL IV: CPT | Mod: PBBFAC,,,

## 2025-04-09 RX ORDER — PROCHLORPERAZINE MALEATE 10 MG
10 TABLET ORAL 3 TIMES DAILY PRN
Qty: 15 TABLET | Refills: 0 | Status: SHIPPED | OUTPATIENT
Start: 2025-04-09

## 2025-04-09 NOTE — PROGRESS NOTES
Subjective:       Patient ID: Luisa Jesus is a 40 y.o. female Body mass index is 31.95 kg/m².    Chief Complaint: Abdominal Pain (Bloating and Nausea)    This patient is new to me.  Referring Provider: No ref. provider found for bloating, constipation, nausea.  Established patient of Dr. Pavon.     Continues to complain of RUQ pain - in the spot that coordinates with gallbladder removal clip seen on x-ray    Has taken     MiraLax  Mag citrate  Linzess       Review of Systems   Constitutional:  Positive for activity change and appetite change. Negative for fatigue, fever and unexpected weight change.   HENT:  Negative for sore throat and trouble swallowing.    Respiratory:  Negative for cough and shortness of breath.    Cardiovascular:  Negative for chest pain.   Gastrointestinal:  Positive for abdominal distention, abdominal pain, constipation, nausea and vomiting. Negative for anal bleeding, blood in stool, diarrhea and rectal pain.       Patient's last menstrual period was 03/21/2025 (exact date).  Past Medical History:   Diagnosis Date    Allergy     Atrial fibrillation 10/01/2023    aproxx    General anesthetics causing adverse effect in therapeutic use     IBS (irritable bowel syndrome)     Rheumatoid arthritis, unspecified     SVT (supraventricular tachycardia)     Systemic lupus erythematosus, organ or system involvement unspecified     Thrush 01/22/2015    recurrent     Past Surgical History:   Procedure Laterality Date    BREAST BIOPSY Left 11/2024    CHOLECYSTECTOMY      Dx Laparoscopy      presumed endometriosis    EPIDURAL STEROID INJECTION INTO LUMBAR SPINE N/A 11/15/2024    Procedure: Injection-steroid-epidural-lumbar;  Surgeon: Konstantin Linder MD;  Location: [facility];  Service: Pain Management;  Laterality: N/A;    ESOPHAGOGASTRODUODENOSCOPY N/A 02/11/2025    Procedure: EGD (ESOPHAGOGASTRODUODENOSCOPY);  Surgeon: Brian Pavon MD;  Location: [facility];  Service: Endoscopy;   Laterality: N/A;    INJECTION OF ANESTHETIC AGENT AROUND MEDIAL BRANCH NERVES INNERVATING CERVICAL FACET JOINT Bilateral 3/7/2025    Procedure: Block-nerve-medial branch-cervical;  Surgeon: Konstantin Linder MD;  Location: [facility] PAIN MANAGEMENT;  Service: Pain Management;  Laterality: Bilateral;    INJECTION OF ANESTHETIC AGENT AROUND MEDIAL BRANCH NERVES INNERVATING CERVICAL FACET JOINT Bilateral 4/4/2025    Procedure: Block-nerve-medial branch-cervical;  Surgeon: Konstantin Linder MD;  Location: [facility] PAIN MANAGEMENT;  Service: Pain Management;  Laterality: Bilateral;    SINUS SURGERY  01/01/2007    SPINE SURGERY      C spine    TONSILLECTOMY      VENTRICULAR ABLATION SURGERY      WISDOM TOOTH EXTRACTION       Family History   Adopted: Yes   Problem Relation Name Age of Onset    Stroke Mother  53    Heart disease Maternal Grandmother      Eclampsia Neg Hx      Colon cancer Neg Hx       Social History[1]  Wt Readings from Last 10 Encounters:   04/09/25 87.1 kg (192 lb 0.3 oz)   03/28/25 79.4 kg (175 lb 0.7 oz)   03/27/25 79.4 kg (175 lb)   03/03/25 83 kg (182 lb 15.7 oz)   02/13/25 83 kg (183 lb)   02/11/25 83 kg (183 lb)   01/28/25 83.4 kg (183 lb 15.6 oz)   01/24/25 83 kg (182 lb 15.7 oz)   01/15/25 77.1 kg (170 lb)   01/14/25 77.1 kg (169 lb 15.6 oz)     Lab Results   Component Value Date    WBC 8.34 03/27/2025    HGB 11.5 (L) 03/27/2025    HCT 36.3 (L) 03/27/2025    MCV 89 03/27/2025     03/27/2025     CMP  Sodium   Date Value Ref Range Status   03/27/2025 138 136 - 145 mmol/L Final   01/15/2025 138 136 - 145 mmol/L Final     Potassium   Date Value Ref Range Status   03/27/2025 4.5 3.5 - 5.1 mmol/L Final   01/15/2025 4.1 3.5 - 5.1 mmol/L Final     Chloride   Date Value Ref Range Status   03/27/2025 104 95 - 110 mmol/L Final   01/15/2025 106 95 - 110 mmol/L Final     CO2   Date Value Ref Range Status   03/27/2025 26 23 - 29 mmol/L Final   01/15/2025 21 (L) 23 - 29 mmol/L Final      Glucose   Date Value Ref Range Status   01/15/2025 98 70 - 110 mg/dL Final     BUN   Date Value Ref Range Status   03/27/2025 19 6 - 20 mg/dL Final     Creatinine   Date Value Ref Range Status   03/27/2025 0.8 0.5 - 1.4 mg/dL Final     Calcium   Date Value Ref Range Status   03/27/2025 8.7 8.7 - 10.5 mg/dL Final   01/15/2025 9.0 8.7 - 10.5 mg/dL Final     Total Protein   Date Value Ref Range Status   01/15/2025 6.8 6.0 - 8.4 g/dL Final     Albumin   Date Value Ref Range Status   03/27/2025 3.7 3.5 - 5.2 g/dL Final   01/15/2025 3.9 3.5 - 5.2 g/dL Final     Total Bilirubin   Date Value Ref Range Status   01/15/2025 0.3 0.1 - 1.0 mg/dL Final     Comment:     For infants and newborns, interpretation of results should be based  on gestational age, weight and in agreement with clinical  observations.    Premature Infant recommended reference ranges:  Up to 24 hours.............<8.0 mg/dL  Up to 48 hours............<12.0 mg/dL  3-5 days..................<15.0 mg/dL  6-29 days.................<15.0 mg/dL       Bilirubin Total   Date Value Ref Range Status   03/27/2025 0.2 0.1 - 1.0 mg/dL Final     Comment:     For infants and newborns, interpretation of results should be based   on gestational age, weight and in agreement with clinical   observations.    Premature Infant recommended reference ranges:   0-24 hours:  <8.0 mg/dL   24-48 hours: <12.0 mg/dL   3-5 days:    <15.0 mg/dL   6-29 days:   <15.0 mg/dL     Alkaline Phosphatase   Date Value Ref Range Status   01/15/2025 86 40 - 150 U/L Final     ALP   Date Value Ref Range Status   03/27/2025 74 40 - 150 unit/L Final     AST   Date Value Ref Range Status   03/27/2025 21 11 - 45 unit/L Final   01/15/2025 26 10 - 40 U/L Final     ALT   Date Value Ref Range Status   03/27/2025 19 10 - 44 unit/L Final   01/15/2025 41 10 - 44 U/L Final     Anion Gap   Date Value Ref Range Status   03/27/2025 8 8 - 16 mmol/L Final     eGFR if    Date Value Ref Range Status  "  08/04/2014 >60.0 >60 mL/min/1.73 m^2 Final     eGFR if non    Date Value Ref Range Status   08/04/2014 >60.0 >60 mL/min/1.73 m^2 Final     Comment:     Calculation used to obtain the estimated glomerular filtration  rate (eGFR) is the CKD-EPI equation. Since race is unknown   in our information system, the eGFR values for   -American and Non--American patients are given   for each creatinine result.       No results found for: "AMYLASE"  Lab Results   Component Value Date    LIPASE 25 03/27/2025     No results found for: "LIPASERES"  Lab Results   Component Value Date    TSH 0.912 02/18/2025       Reviewed prior medical records including radiology report of OV with myself 1/2025, KUB 4/7/25 & endoscopy history (see surgical history).    Objective:      Physical Exam  Vitals and nursing note reviewed.   Constitutional:       General: She is not in acute distress.     Appearance: She is not ill-appearing.   Cardiovascular:      Rate and Rhythm: Normal rate and regular rhythm.      Pulses: Normal pulses.      Heart sounds: Normal heart sounds.   Pulmonary:      Effort: Pulmonary effort is normal.      Breath sounds: Normal breath sounds.   Abdominal:      General: Abdomen is flat. Bowel sounds are normal. There is no distension.      Palpations: Abdomen is soft.      Tenderness: There is no abdominal tenderness.   Skin:     General: Skin is warm and dry.      Capillary Refill: Capillary refill takes 2 to 3 seconds.   Neurological:      Mental Status: She is alert and oriented to person, place, and time.         Assessment:       1. Chronic idiopathic constipation    2. Right upper quadrant abdominal pain        Plan:       Chronic idiopathic constipation  Increase linzess to 290 mcg daily  Repeat X-ray to reassess stool burden in colon    Pt concerned for gastroparesis - will rule out with gastric emptying study.    - schedule Colonoscopy, discussed procedure with the patient, including " risks and benefits, patient verbalized understanding      -     NM Gastric Emptying; Future; Expected date: 04/09/2025  -     X-Ray Abdomen AP 1 View; Future; Expected date: 04/09/2025  -     linaCLOtide (LINZESS) 290 mcg Cap capsule; Take 1 capsule (290 mcg total) by mouth before breakfast.  Dispense: 30 capsule; Refill: 11    Right upper quadrant abdominal pain  Pt suspects the pain is due to the cholecystectomy clip seen on imaging.    Continue compazine started by ER   -     prochlorperazine (COMPAZINE) 10 MG tablet; Take 1 tablet (10 mg total) by mouth 3 (three) times daily as needed (nausea).  Dispense: 15 tablet; Refill: 0      Follow up if symptoms worsen or fail to improve.      If no improvement in symptoms or symptoms worsen, call/follow-up at clinic or go to ER.       WILBERT Aburto, SARAHP-C    Encounter includes face to face time and non-face to face time preparing to see the patient (eg, review of tests), obtaining and/or reviewing separately obtained history, documenting clinical information in the electronic or other health record, independently interpreting results (not separately reported) and communicating results to the patient/family/caregiver, or care coordination (not separately reported).     A dictation software program was used for this note. Please expect some simple typographical errors in this note.         [1]   Social History  Tobacco Use    Smoking status: Never    Smokeless tobacco: Never   Substance Use Topics    Alcohol use: Yes     Comment: rarely    Drug use: No

## 2025-04-15 ENCOUNTER — PATIENT MESSAGE (OUTPATIENT)
Dept: UROGYNECOLOGY | Facility: CLINIC | Age: 40
End: 2025-04-15
Payer: OTHER GOVERNMENT

## 2025-04-15 ENCOUNTER — PATIENT MESSAGE (OUTPATIENT)
Dept: CARDIOLOGY | Facility: CLINIC | Age: 40
End: 2025-04-15
Payer: OTHER GOVERNMENT

## 2025-04-15 ENCOUNTER — PATIENT MESSAGE (OUTPATIENT)
Dept: GASTROENTEROLOGY | Facility: CLINIC | Age: 40
End: 2025-04-15
Payer: OTHER GOVERNMENT

## 2025-04-15 ENCOUNTER — PATIENT MESSAGE (OUTPATIENT)
Dept: PAIN MEDICINE | Facility: CLINIC | Age: 40
End: 2025-04-15
Payer: OTHER GOVERNMENT

## 2025-04-15 ENCOUNTER — PATIENT MESSAGE (OUTPATIENT)
Dept: NEUROLOGY | Facility: CLINIC | Age: 40
End: 2025-04-15
Payer: OTHER GOVERNMENT

## 2025-04-15 ENCOUNTER — PATIENT MESSAGE (OUTPATIENT)
Dept: SURGERY | Facility: CLINIC | Age: 40
End: 2025-04-15
Payer: OTHER GOVERNMENT

## 2025-04-28 ENCOUNTER — HOSPITAL ENCOUNTER (OUTPATIENT)
Dept: RADIOLOGY | Facility: HOSPITAL | Age: 40
Discharge: HOME | End: 2025-04-28
Payer: OTHER GOVERNMENT

## 2025-04-28 DIAGNOSIS — K59.04 CHRONIC IDIOPATHIC CONSTIPATION: ICD-10-CM

## 2025-04-28 PROCEDURE — 78264 GASTRIC EMPTYING IMG STUDY: CPT | Mod: TC

## 2025-04-28 PROCEDURE — A9541 TC99M SULFUR COLLOID: HCPCS

## 2025-04-28 PROCEDURE — 78264 GASTRIC EMPTYING IMG STUDY: CPT | Mod: 26,,, | Performed by: RADIOLOGY

## 2025-04-28 RX ORDER — TECHNETIUM TC 99M SULFUR COLLOID 2 MG
1.1 KIT MISCELLANEOUS
Status: COMPLETED | OUTPATIENT
Start: 2025-04-28 | End: 2025-04-28

## 2025-04-28 RX ADMIN — TECHNETIUM TC 99M SULFUR COLLOID KIT 1.1 MILLICURIE: KIT at 09:04

## 2025-04-30 ENCOUNTER — HOSPITAL ENCOUNTER (OUTPATIENT)
Facility: HOSPITAL | Age: 40
Discharge: HOME | End: 2025-04-30
Attending: STUDENT IN AN ORGANIZED HEALTH CARE EDUCATION/TRAINING PROGRAM | Admitting: STUDENT IN AN ORGANIZED HEALTH CARE EDUCATION/TRAINING PROGRAM
Payer: OTHER GOVERNMENT

## 2025-04-30 DIAGNOSIS — M47.892 OTHER SPONDYLOSIS, CERVICAL REGION: ICD-10-CM

## 2025-04-30 LAB
B-HCG UR QL: NEGATIVE
CTP QC/QA: YES

## 2025-04-30 PROCEDURE — 63600175 PHARM REV CODE 636 W HCPCS: Performed by: STUDENT IN AN ORGANIZED HEALTH CARE EDUCATION/TRAINING PROGRAM

## 2025-04-30 PROCEDURE — 64634 DESTROY C/TH FACET JNT ADDL: CPT | Mod: RT | Performed by: STUDENT IN AN ORGANIZED HEALTH CARE EDUCATION/TRAINING PROGRAM

## 2025-04-30 PROCEDURE — 64634 DESTROY C/TH FACET JNT ADDL: CPT | Mod: RT,,, | Performed by: STUDENT IN AN ORGANIZED HEALTH CARE EDUCATION/TRAINING PROGRAM

## 2025-04-30 PROCEDURE — 64633 DESTROY CERV/THOR FACET JNT: CPT | Mod: RT,,, | Performed by: STUDENT IN AN ORGANIZED HEALTH CARE EDUCATION/TRAINING PROGRAM

## 2025-04-30 PROCEDURE — 64633 DESTROY CERV/THOR FACET JNT: CPT | Mod: RT | Performed by: STUDENT IN AN ORGANIZED HEALTH CARE EDUCATION/TRAINING PROGRAM

## 2025-04-30 PROCEDURE — 99152 MOD SED SAME PHYS/QHP 5/>YRS: CPT | Performed by: STUDENT IN AN ORGANIZED HEALTH CARE EDUCATION/TRAINING PROGRAM

## 2025-04-30 PROCEDURE — 99153 MOD SED SAME PHYS/QHP EA: CPT | Performed by: STUDENT IN AN ORGANIZED HEALTH CARE EDUCATION/TRAINING PROGRAM

## 2025-04-30 PROCEDURE — 81025 URINE PREGNANCY TEST: CPT | Performed by: STUDENT IN AN ORGANIZED HEALTH CARE EDUCATION/TRAINING PROGRAM

## 2025-04-30 RX ORDER — BUPIVACAINE HYDROCHLORIDE 2.5 MG/ML
INJECTION, SOLUTION EPIDURAL; INFILTRATION; INTRACAUDAL; PERINEURAL
Status: DISCONTINUED | OUTPATIENT
Start: 2025-04-30 | End: 2025-04-30 | Stop reason: HOSPADM

## 2025-04-30 RX ORDER — LIDOCAINE HYDROCHLORIDE 10 MG/ML
1 INJECTION, SOLUTION EPIDURAL; INFILTRATION; INTRACAUDAL; PERINEURAL ONCE
Status: DISCONTINUED | OUTPATIENT
Start: 2025-04-30 | End: 2025-04-30 | Stop reason: HOSPADM

## 2025-04-30 RX ORDER — FENTANYL CITRATE 50 UG/ML
INJECTION, SOLUTION INTRAMUSCULAR; INTRAVENOUS
Status: DISCONTINUED | OUTPATIENT
Start: 2025-04-30 | End: 2025-04-30 | Stop reason: HOSPADM

## 2025-04-30 RX ORDER — MIDAZOLAM HYDROCHLORIDE 1 MG/ML
INJECTION INTRAMUSCULAR; INTRAVENOUS
Status: DISCONTINUED | OUTPATIENT
Start: 2025-04-30 | End: 2025-04-30 | Stop reason: HOSPADM

## 2025-04-30 RX ORDER — SODIUM CHLORIDE, SODIUM LACTATE, POTASSIUM CHLORIDE, CALCIUM CHLORIDE 600; 310; 30; 20 MG/100ML; MG/100ML; MG/100ML; MG/100ML
INJECTION, SOLUTION INTRAVENOUS CONTINUOUS
Status: DISCONTINUED | OUTPATIENT
Start: 2025-04-30 | End: 2025-04-30 | Stop reason: HOSPADM

## 2025-04-30 RX ORDER — LIDOCAINE HYDROCHLORIDE 10 MG/ML
INJECTION, SOLUTION EPIDURAL; INFILTRATION; INTRACAUDAL; PERINEURAL
Status: DISCONTINUED | OUTPATIENT
Start: 2025-04-30 | End: 2025-04-30 | Stop reason: HOSPADM

## 2025-04-30 RX ORDER — LIDOCAINE HYDROCHLORIDE 20 MG/ML
INJECTION, SOLUTION EPIDURAL; INFILTRATION; INTRACAUDAL; PERINEURAL
Status: DISCONTINUED | OUTPATIENT
Start: 2025-04-30 | End: 2025-04-30 | Stop reason: HOSPADM

## 2025-04-30 NOTE — DISCHARGE SUMMARY
[facility]  Discharge Note  Short Stay     Admit Date: 4/30/2025    Discharge Date: 4/30/2025     Attending Physician: Konstantin Linder MD    Diagnoses:  Cervical spondylosis    Discharged Condition: Good     Hospital Course: Patient was admitted for an outpatient interventional pain management procedure and tolerated the procedure well with no complications.     Final Diagnoses: Same as principal problem.     Disposition: Home or Self Care     Follow up/Patient Instructions:   Follow-up in 4 weeks unless otherwise instructed. May return sooner as needed.       Reconciled Medications:     Medication List        CONTINUE taking these medications      aspirin 81 MG Chew     betamethasone dipropionate 0.05 % ointment  Commonly known as: BETANATE  Apply topically.     diltiaZEM 240 MG 24 hr capsule  Commonly known as: CARDIZEM CD  Take 1 capsule (240 mg total) by mouth once daily.     DULoxetine 60 MG capsule  Commonly known as: CYMBALTA  Take 1 capsule (60 mg total) by mouth once daily.     ENBREL SUBQ  Inject into the skin.     famotidine 40 MG tablet  Commonly known as: PEPCID  Take 1 tablet (40 mg total) by mouth nightly as needed for Heartburn.     FIBER 6 ORAL     FLONASE ALLERGY RELIEF 50 mcg/actuation nasal spray  Generic drug: fluticasone propionate     GI cocktail antac/dicyc/lidoc  30 ml  4 times a day  AC/HS PRN abdominal pain and burning     linaCLOtide 290 mcg Cap capsule  Commonly known as: LINZESS  Take 1 capsule (290 mcg total) by mouth before breakfast.     magnesium oxide 200 mg magnesium Chew  Take by mouth.     methocarbamoL 500 MG Tab  Commonly known as: Robaxin  TAKE 2 TABLETS(1000 MG) BY MOUTH EVERY EVENING     MULTIVITAMIN-CALCIUM AND IRON ORAL     omeprazole 40 MG capsule  Commonly known as: PRILOSEC  Take 1 capsule (40 mg total) by mouth every morning.     prochlorperazine 10 MG tablet  Commonly known as: COMPAZINE  Take 1 tablet (10 mg total) by mouth 3 (three) times daily as  needed (nausea).     turmeric root extract 500 mg Cap     TYLENOL ARTHRITIS PAIN ORAL  Take by mouth.     VITAMIN D3 ORAL  Take by mouth.            STOP taking these medications      meloxicam 15 MG tablet  Commonly known as: MOBIC     ondansetron 8 MG Tbdl  Commonly known as: ZOFRAN-ODT             Discharge Procedure Orders (must include Diet, Follow-up, Activity)   Ice to affected area   Order Comments: 20 minutes of ice or until area numb to the touch if area is sore 2-3 times per day as needed     No driving until:   Order Comments: Until following day     No dressing needed     Notify your health care provider if you experience any of the following:  temperature >100.4     Notify your health care provider if you experience any of the following:  persistent nausea and vomiting or diarrhea     Notify your health care provider if you experience any of the following:  severe uncontrolled pain     Notify your health care provider if you experience any of the following:  redness, tenderness, or signs of infection (pain, swelling, redness, odor or green/yellow discharge around incision site)     Notify your health care provider if you experience any of the following:  difficulty breathing or increased cough     Notify your health care provider if you experience any of the following:  severe persistent headache     Notify your health care provider if you experience any of the following:  worsening rash     Notify your health care provider if you experience any of the following:  persistent dizziness, light-headedness, or visual disturbances     Notify your health care provider if you experience any of the following:  increased confusion or weakness     Shower on day dressing removed (No bath)       Konstantin Linder MD  Interventional Pain Medicine / Physical Medicine & Rehabilitation

## 2025-04-30 NOTE — H&P
HPI  Patient presenting for Procedure(s) (LRB):  RADIOFREQUENCY THERMAL COAGULATION, NERVE, SPINAL, CERVICAL, POSTERIOR RAMUS, MEDIAL BRANCH c3 and c4 and TON rFA (Right)     Patient on Anti-coagulation No    No health changes since previous encounter. No changes in pain since procedure was scheduled at previous visit. Denies any fevers or infections. Denies any issues with clotting or bleeding.     Facility-Administered Medications as of 4/30/2025   Medication Dose Route Frequency Provider Last Rate Last Admin    lactated ringers infusion   Intravenous Continuous Konstantin Linder MD        LIDOcaine (PF) 10 mg/ml (1%) injection 10 mg  1 mL Intradermal Once Konstantin Linder MD         Outpatient Medications as of 4/30/2025   Medication Sig Dispense Refill    aspirin 81 MG Chew       diltiaZEM (CARDIZEM CD) 240 MG 24 hr capsule Take 1 capsule (240 mg total) by mouth once daily. 90 capsule 3    DULoxetine (CYMBALTA) 60 MG capsule Take 1 capsule (60 mg total) by mouth once daily. 30 capsule 11    methocarbamoL (ROBAXIN) 500 MG Tab TAKE 2 TABLETS(1000 MG) BY MOUTH EVERY EVENING 60 tablet 5    acetaminophen (TYLENOL ARTHRITIS PAIN ORAL) Take by mouth.      betamethasone dipropionate (DIPROLENE) 0.05 % ointment Apply topically.      bran/gum/fib/steven/psyl/kelp/pec (FIBER 6 ORAL)       CHOLECALCIFEROL, VITAMIN D3, (VITAMIN D3 ORAL) Take by mouth.      etanercept (ENBREL SUBQ) Inject into the skin.      famotidine (PEPCID) 40 MG tablet Take 1 tablet (40 mg total) by mouth nightly as needed for Heartburn. 30 tablet 11    FLONASE ALLERGY RELIEF 50 mcg/actuation nasal spray       GI cocktail antac/dicyc/lidoc 30 ml  4 times a day  AC/HS PRN abdominal pain and burning 200 mL 0    magnesium oxide 200 mg magnesium Chew Take by mouth.      meloxicam (MOBIC) 15 MG tablet Take 1 tablet (15 mg total) by mouth once daily. 30 tablet 0    multivit with calcium,iron,min (MULTIVITAMIN-CALCIUM AND IRON ORAL)       omeprazole  (PRILOSEC) 40 MG capsule Take 1 capsule (40 mg total) by mouth every morning. 90 capsule 3    ondansetron (ZOFRAN-ODT) 8 MG TbDL Take 1 tablet (8 mg total) by mouth 2 (two) times daily. 30 tablet 0    turmeric root extract 500 mg Cap        Past Medical History:   Diagnosis Date    Allergy     Atrial fibrillation 10/01/2023    aproxx    General anesthetics causing adverse effect in therapeutic use     IBS (irritable bowel syndrome)     Rheumatoid arthritis, unspecified     SVT (supraventricular tachycardia)     Systemic lupus erythematosus, organ or system involvement unspecified     Thrush 01/22/2015    recurrent     Past Surgical History:   Procedure Laterality Date    BREAST BIOPSY Left 11/2024    CHOLECYSTECTOMY      Dx Laparoscopy      presumed endometriosis    EPIDURAL STEROID INJECTION INTO LUMBAR SPINE N/A 11/15/2024    Procedure: Injection-steroid-epidural-lumbar;  Surgeon: Konstantin Linder MD;  Location: [facility] OR;  Service: Pain Management;  Laterality: N/A;    ESOPHAGOGASTRODUODENOSCOPY N/A 02/11/2025    Procedure: EGD (ESOPHAGOGASTRODUODENOSCOPY);  Surgeon: Brian Pavon MD;  Location: [facility];  Service: Endoscopy;  Laterality: N/A;    INJECTION OF ANESTHETIC AGENT AROUND MEDIAL BRANCH NERVES INNERVATING CERVICAL FACET JOINT Bilateral 3/7/2025    Procedure: Block-nerve-medial branch-cervical;  Surgeon: Konstantin Linder MD;  Location: [facility] PAIN MANAGEMENT;  Service: Pain Management;  Laterality: Bilateral;    INJECTION OF ANESTHETIC AGENT AROUND MEDIAL BRANCH NERVES INNERVATING CERVICAL FACET JOINT Bilateral 4/4/2025    Procedure: Block-nerve-medial branch-cervical;  Surgeon: Konstantin Linder MD;  Location: [facility] PAIN [facility]NA[facility];  Service: Pain Management;  Laterality: Bilateral;    SINUS SURGERY  01/01/2007    SPINE SURGERY      C spine    TONSILLECTOMY      VENTRICULAR ABLATION SURGERY      WISDOM TOOTH EXTRACTION       Review of patient's allergies indicates:    Allergen Reactions    Diclofenac sodium Other (See Comments)     Other reaction(s): Bloody stool    Ortho tri-cyclen (21) Hives    Kevzara [sarilumab]      Alcolu palsy type of reaction    Sulfa (sulfonamide antibiotics) Hives and Rash    Scopolamine     Sulfur Hives    Bactrim [sulfamethoxazole-trimethoprim] Rash    Tocilizumab Rash      Current Facility-Administered Medications   Medication    lactated ringers infusion    LIDOcaine (PF) 10 mg/ml (1%) injection 10 mg       PMHx, PSHx, Allergies, Medications reviewed in epic    ROS negative except pain complaints in HPI    OBJECTIVE:    Wt 87.1 kg (192 lb 0.3 oz)   LMP 04/14/2025   Breastfeeding No   BMI 31.95 kg/m²     PHYSICAL EXAMINATION:    GENERAL: Well appearing, in no acute distress, alert and oriented.  PSYCH:  Mood and affect appropriate.  SKIN: Skin color, texture, turgor normal in procedure area, no rashes or lesions which will impact the procedure.  CV: RRR with palpation of the radial artery.  PULM: No evidence of respiratory difficulty, symmetric chest rise. Clear to auscultation.  NEURO: Alert. Oriented. Speech fluent and appropriate. Moving all extremities.    Plan:    Proceed with procedure as planned Procedure(s) (LRB):  RADIOFREQUENCY THERMAL COAGULATION, NERVE, SPINAL, CERVICAL, POSTERIOR RAMUS, MEDIAL BRANCH c3 and c4 and TON rFA (Right)    Konstantin Linder  04/30/2025     Pathology Override (Pathology Will Render As Diagnosis Name If Left Blank): Basal Cell Carcinoma, Nodular - Accession: [id number] Pathology Override (Pathology Will Render As Diagnosis Name If Left Blank): Basal Cell Carcinoma, Nodular - Accession: [id number]

## 2025-04-30 NOTE — PLAN OF CARE
Patient's ride home at bedside for discharge instructions. Her upcoming appointments were reviewed.

## 2025-04-30 NOTE — OP NOTE
Therapeutic Cervical Medial Branch Radiofrequency Ablation Under Fluoroscopy     The procedure, risks, benefits, and options were discussed with the patient. There are no contraindications to the procedure. The patent expressed understanding and agreed to the procedure. Informed written consent was obtained prior to the start of the procedure and can be found in the patient's chart.        PATIENT NAME: Luisa Jesus   MRN: [id number]     DATE OF PROCEDURE: 04/30/2025       PROCEDURE: Therapeutic Right TON, C3, and C4 Cervical Radiofrequency Ablation under Fluoroscopy    PRE-OP DIAGNOSIS: Cervical spondylosis without myelopathy [M47.812] Cervical Spondylosis [M47.812]    POST-OP DIAGNOSIS: Same    PHYSICIAN: Konstantin Linder MD    ASSISTANTS: none     MEDICATIONS INJECTED:  Preservative-free Decadron 10mg with 9cc of Bupivicaine 0.25%    LOCAL ANESTHETIC INJECTED:   Xylocaine 2%    SEDATION: Versed 2mg and Fentanyl 50mcg                                                                                                                                                                                     Conscious sedation ordered by M.D. Patient re-evaluation prior to administration of conscious sedation. No changes noted in patient's status from initial evaluation. The patient's vital signs were monitored by RN and patient remained hemodynamically stable throughout the procedure.    Event Time In   Sedation Start 0831   Sedation End 0855       ESTIMATED BLOOD LOSS:  None    COMPLICATIONS:  None.     INTERVAL HISTORY: Patient has clinical and imaging findings suggestive of facet mediated pain. Patients has completed 2 previous diagnostic medial branch blocks at specified levels with at least 80% relief for the expected duration of the local anesthetic utilized.    TECHNIQUE: Time-out was performed to identify the patient and procedure to be performed. With the patient laying in a prone position, the surgical area was  prepped and draped in the usual sterile fashion using ChloraPrep and fenestrated drape. The levels were determined under fluoroscopic guidance. Skin anesthesia was achieved by injecting Lidocaine 2% over the injection sites. A 18 gauge 10mm curved active tip needle was introduced to the anatomic local of the medial branch at each of the above levels using AP, lateral and/or contralateral oblique fluoroscopic imaging. Then the sensory and motor testing was performed to confirm that the needle tips were in the correct location. After negative aspiration for blood or CSF was confirmed, 1 mL of the lidocaine 2% listed above was injected slowly at each site. This was followed by thermal lesioning at 80 degrees celsius for 90 seconds. That was followed by slowly injecting 1 mL of the medication mixture listed above at each site. The needles were removed and bleeding was nil. A sterile dressing was applied. No specimens collected. The patient tolerated the procedure well and did not have any procedure related motor deficit at the conclusion of the procedure.    The patient was monitored after the procedure in the recovery area. They were given post-procedure and discharge instructions to follow at home. The patient was discharged in a stable condition.     Konstantin Linder MD

## 2025-05-01 ENCOUNTER — TELEPHONE (OUTPATIENT)
Dept: PAIN MEDICINE | Facility: CLINIC | Age: 40
End: 2025-05-01
Payer: OTHER GOVERNMENT

## 2025-05-01 VITALS
SYSTOLIC BLOOD PRESSURE: 120 MMHG | TEMPERATURE: 98 F | RESPIRATION RATE: 18 BRPM | BODY MASS INDEX: 31.95 KG/M2 | HEART RATE: 87 BPM | WEIGHT: 192 LBS | DIASTOLIC BLOOD PRESSURE: 75 MMHG | OXYGEN SATURATION: 100 %

## 2025-05-01 DIAGNOSIS — M47.812 CERVICAL SPONDYLOSIS WITHOUT MYELOPATHY: Primary | ICD-10-CM

## 2025-05-01 NOTE — TELEPHONE ENCOUNTER
Spoke with this patient and informed her that her doctor will be and she will have to reschedule and so I got her rescheduled with a time and date that works best for her.

## 2025-05-05 ENCOUNTER — HOSPITAL ENCOUNTER (OUTPATIENT)
Dept: RADIOLOGY | Facility: HOSPITAL | Age: 40
Discharge: HOME | End: 2025-05-05
Attending: PHYSICIAN ASSISTANT
Payer: OTHER GOVERNMENT

## 2025-05-05 ENCOUNTER — OFFICE VISIT (OUTPATIENT)
Dept: SURGERY | Facility: CLINIC | Age: 40
End: 2025-05-05
Payer: OTHER GOVERNMENT

## 2025-05-05 VITALS
HEIGHT: 65 IN | BODY MASS INDEX: 31.99 KG/M2 | SYSTOLIC BLOOD PRESSURE: 128 MMHG | DIASTOLIC BLOOD PRESSURE: 81 MMHG | WEIGHT: 192 LBS | HEART RATE: 102 BPM

## 2025-05-05 DIAGNOSIS — N60.19 FIBROCYSTIC BREAST DISEASE (FCBD), UNSPECIFIED LATERALITY: ICD-10-CM

## 2025-05-05 DIAGNOSIS — Z12.39 ENCOUNTER FOR OTHER SCREENING FOR MALIGNANT NEOPLASM OF BREAST: ICD-10-CM

## 2025-05-05 DIAGNOSIS — N64.4 BREAST PAIN IN FEMALE: ICD-10-CM

## 2025-05-05 DIAGNOSIS — R92.8 ABNORMAL MAMMOGRAM OF LEFT BREAST: ICD-10-CM

## 2025-05-05 DIAGNOSIS — N60.19 FIBROCYSTIC BREAST DISEASE (FCBD), UNSPECIFIED LATERALITY: Primary | ICD-10-CM

## 2025-05-05 DIAGNOSIS — N64.52 NIPPLE DISCHARGE IN FEMALE: ICD-10-CM

## 2025-05-05 PROCEDURE — 99999 PR PBB SHADOW E&M-EST. PATIENT-LVL IV: CPT | Mod: PBBFAC,,, | Performed by: ORTHOPAEDIC SURGERY

## 2025-05-05 PROCEDURE — 99213 OFFICE O/P EST LOW 20 MIN: CPT | Mod: S$PBB,,, | Performed by: ORTHOPAEDIC SURGERY

## 2025-05-05 PROCEDURE — 99214 OFFICE O/P EST MOD 30 MIN: CPT | Mod: PBBFAC | Performed by: ORTHOPAEDIC SURGERY

## 2025-05-05 PROCEDURE — 77065 DX MAMMO INCL CAD UNI: CPT | Mod: TC,LT

## 2025-05-05 NOTE — PROGRESS NOTES
[facility]  Department of Surgery      REFERRING PROVIDER: No referring provider defined for this encounter.    Chief Complaint: 3 Month F/U and US      Subjective:      Patient ID: Luisa Jesus is a 40 y.o. female who presents with bilateral breast pain, left axillary mass. States she has had left axillary mass/ pain for the last two years. In the last year it has been fluctuating in size more frequently and enlarging in general. Patient does routinely do self breast exams and has noted a change on breast exam.  Patient admits to nipple discharge. Reports a few episodes of spontaneous left nipple discharge, discharge is clear/ milky and from two ducts. Patient admits to to previous breast biopsy. Pt has screening mammogram 10/2024 which showed a mass at 12 OC, BIRADs 0. Follow-up US showed numerous bilateral simple cysts and cysts clusters, the largest measuring 3.8 cm at 12 OC, 2 CFN accounting for the mammogram findings. However, two other findings were noted on US that did not have mammogram correlate: 10 mm oval mass at 11 OC and 6 mm round mass at 10 OC which were both recommended for biopsy. US guided bx was performed on 24 with pathology revealing benign nodular fibrotic stroma and fibroadenomatoid change.      He main concern today is the left axillary enlargement. States she never had imaging that specifically evaluated this area.     GYN History:  Age of menarche was 9.   Premenopausal   Last menstrual period was 25.   Patient admits to hormonal therapy. Estrogen/ progesterone a few months.   Patient is .   Age of first live birth was 22.   Patient did breast feed.    Family history is unknown 2/2 being adopted.     Interval history 25: Since her last visit, the left axillary US showed normal tissue corresponding to the areas of concern in both axillary regions. TSH and Prolactin WNL. She also underwent left breast cyst aspiration. Today, reports still having  cyclic breast pain. 3 episodes of left nipple discharge.     Past Medical History:   Diagnosis Date    Allergy     Atrial fibrillation 10/01/2023    aproxx    General anesthetics causing adverse effect in therapeutic use     IBS (irritable bowel syndrome)     Rheumatoid arthritis, unspecified     SVT (supraventricular tachycardia)     Systemic lupus erythematosus, organ or system involvement unspecified     Thrush 01/22/2015    recurrent     Past Surgical History:   Procedure Laterality Date    BREAST BIOPSY Left 11/2024    CHOLECYSTECTOMY      Dx Laparoscopy      presumed endometriosis    EPIDURAL STEROID INJECTION INTO LUMBAR SPINE N/A 11/15/2024    Procedure: Injection-steroid-epidural-lumbar;  Surgeon: Konstantin Linder MD;  Location: [facility] OR;  Service: Pain Management;  Laterality: N/A;    ESOPHAGOGASTRODUODENOSCOPY N/A 02/11/2025    Procedure: EGD (ESOPHAGOGASTRODUODENOSCOPY);  Surgeon: Brian Pavon MD;  Location: [facility];  Service: Endoscopy;  Laterality: N/A;    INJECTION OF ANESTHETIC AGENT AROUND MEDIAL BRANCH NERVES INNERVATING CERVICAL FACET JOINT Bilateral 3/7/2025    Procedure: Block-nerve-medial branch-cervical;  Surgeon: Konstantin Linder MD;  Location: [facility] PAIN MANAGEMENT;  Service: Pain Management;  Laterality: Bilateral;    INJECTION OF ANESTHETIC AGENT AROUND MEDIAL BRANCH NERVES INNERVATING CERVICAL FACET JOINT Bilateral 4/4/2025    Procedure: Block-nerve-medial branch-cervical;  Surgeon: Konstantin Linder MD;  Location: [facility] PAIN MANAGEMENT;  Service: Pain Management;  Laterality: Bilateral;    RADIOFREQUENCY THERMAL COAGULATION OF MEDIAL BRANCH OF POSTERIOR RAMUS OF CERVICAL SPINAL NERVE Right 4/30/2025    Procedure: RADIOFREQUENCY THERMAL COAGULATION, NERVE, SPINAL, CERVICAL, POSTERIOR RAMUS, MEDIAL BRANCH;  Surgeon: Konstantin Linder MD;  Location: [facility] PAIN MANAGEMENT;  Service: Pain Management;  Laterality: Right;    SINUS SURGERY   01/01/2007    SPINE SURGERY      C spine    TONSILLECTOMY      VENTRICULAR ABLATION SURGERY      WISDOM TOOTH EXTRACTION       Current Outpatient Medications on File Prior to Visit   Medication Sig Dispense Refill    acetaminophen (TYLENOL ARTHRITIS PAIN ORAL) Take by mouth.      aspirin 81 MG Chew       betamethasone dipropionate (DIPROLENE) 0.05 % ointment Apply topically.      bran/gum/fib/steven/psyl/kelp/pec (FIBER 6 ORAL)       CHOLECALCIFEROL, VITAMIN D3, (VITAMIN D3 ORAL) Take by mouth.      diltiaZEM (CARDIZEM CD) 240 MG 24 hr capsule Take 1 capsule (240 mg total) by mouth once daily. 90 capsule 3    DULoxetine (CYMBALTA) 60 MG capsule Take 1 capsule (60 mg total) by mouth once daily. 30 capsule 11    etanercept (ENBREL SUBQ) Inject into the skin.      famotidine (PEPCID) 40 MG tablet Take 1 tablet (40 mg total) by mouth nightly as needed for Heartburn. 30 tablet 11    FLONASE ALLERGY RELIEF 50 mcg/actuation nasal spray       GI cocktail antac/dicyc/lidoc 30 ml  4 times a day  AC/HS PRN abdominal pain and burning 200 mL 0    linaCLOtide (LINZESS) 290 mcg Cap capsule Take 1 capsule (290 mcg total) by mouth before breakfast. 30 capsule 11    magnesium oxide 200 mg magnesium Chew Take by mouth.      methocarbamoL (ROBAXIN) 500 MG Tab TAKE 2 TABLETS(1000 MG) BY MOUTH EVERY EVENING 60 tablet 5    multivit with calcium,iron,min (MULTIVITAMIN-CALCIUM AND IRON ORAL)       omeprazole (PRILOSEC) 40 MG capsule Take 1 capsule (40 mg total) by mouth every morning. 90 capsule 3    prochlorperazine (COMPAZINE) 10 MG tablet Take 1 tablet (10 mg total) by mouth 3 (three) times daily as needed (nausea). 15 tablet 0    turmeric root extract 500 mg Cap        No current facility-administered medications on file prior to visit.     Social History     Socioeconomic History    Marital status:     Number of children: 3   Occupational History    Occupation: back in school for nutrition   Tobacco Use    Smoking status: Never     Smokeless tobacco: Never   Substance and Sexual Activity    Alcohol use: Yes     Comment: rarely    Drug use: No    Sexual activity: Yes     Partners: Male     Birth control/protection: None     Social Drivers of Health     Financial Resource Strain: Low Risk  (4/9/2025)    Overall Financial Resource Strain (CARDIA)     Difficulty of Paying Living Expenses: Not hard at all   Recent Concern: Financial Resource Strain - Medium Risk (3/24/2025)    Received from [facility]    Overall Financial Resource Strain (CARDIA)     Difficulty of Paying Living Expenses: Somewhat hard   Food Insecurity: No Food Insecurity (4/9/2025)    Hunger Vital Sign     Worried About Running Out of Food in the Last Year: Never true     Ran Out of Food in the Last Year: Never true   Transportation Needs: No Transportation Needs (4/9/2025)    PRAPARE - Transportation     Lack of Transportation (Medical): No     Lack of Transportation (Non-Medical): No   Physical Activity: Sufficiently Active (4/9/2025)    Exercise Vital Sign     Days of Exercise per Week: 4 days     Minutes of Exercise per Session: 60 min   Stress: No Stress Concern Present (4/9/2025)    [employer] - Occupational Stress Questionnaire     Feeling of Stress : Only a little   Recent Concern: Stress - Stress Concern Present (3/24/2025)    Received from [facility] [place] of Occupational Health - Occupational Stress Questionnaire     Feeling of Stress : To some extent   Housing Stability: Low Risk  (4/9/2025)    Housing Stability Vital Sign     Unable to Pay for Housing in the Last Year: No     Number of Times Moved in the Last Year: 0     Homeless in the Last Year: No     Family History   Adopted: Yes   Problem Relation Name Age of Onset    Stroke Mother  53    Heart disease Maternal Grandmother      Eclampsia Neg Hx      Colon cancer Neg Hx          Review of Systems   Constitutional:  Positive for unexpected weight change (weight gain  "from autoimmune disease). Negative for activity change.   Cardiovascular:  Positive for palpitations.   Genitourinary:  Positive for menstrual problem.   Skin:  Negative for color change and wound.   Neurological:  Negative for syncope, light-headedness and headaches.   Psychiatric/Behavioral:  Negative for confusion.      Objective:   /81   Pulse 102   Ht 5' 5" (1.651 m)   Wt 87.1 kg (192 lb)   LMP 04/14/2025   BMI 31.95 kg/m²     Physical Exam   Constitutional: She is oriented to person, place, and time.   HENT:   Head: Normocephalic.   Eyes: Conjunctivae are normal. Pupils are equal, round, and reactive to light.   Cardiovascular:     Tachycardia present.         Pulmonary/Chest: Effort normal. Right breast exhibits mass and tenderness. Right breast exhibits no inverted nipple, no nipple discharge and no skin change. Left breast exhibits mass, nipple discharge and tenderness. Left breast exhibits no inverted nipple and no skin change.       Neurological: She is alert and oriented to person, place, and time.   Skin: Skin is warm and dry.     Psychiatric: Her behavior is normal. Mood normal.        Result:   Mammo Digital Screening Bilat w/ Todd     History:  Patient is 39 y.o. and is seen for encounter for screening mammogram for breast cancer.     Films Compared:  Prior images (if available) were compared.     Findings:  This procedure was performed using tomosynthesis. Computer-aided detection was utilized in the interpretation of this examination.        The breasts are heterogeneously dense, which may obscure small masses.      Left  There is a mass seen in the left breast at 12 o'clock in the middle depth.      Right  There is no evidence of suspicious masses, calcifications, or other abnormal findings in the right breast.     Impression:  Left  Mass: Left breast mass at the middle depth and 12 o'clock. Assessment: 0 - Incomplete. Diagnostic Mammogram and/or Ultrasound is recommended.    "   Right  There is no mammographic evidence of malignancy in the right breast.     BI-RADS Category:   Overall: 0 - Incomplete: Needs Additional Imaging Evaluation        Recommendation:  Diagnostic mammogram with possible ultrasound (if indicated) is recommended.      Your estimated lifetime risk of breast cancer (to age 85) based on Tyrer-Cuzick risk assessment model is 12.74%.  According to the American Cancer Society, patients with a lifetime breast cancer risk of 20% or higher might benefit from supplemental screening tests, such as screening breast MRI.     Radiology review: Images personally reviewed by me in the clinic.   Result:   US Breast Bilateral Complete     History:  Patient is 39 y.o. and is seen for breast pain in female.     Films Compared:  Compared to: 10/10/2024 Mammo Digital Screening Bilat w/ Todd and 10/12/2023 Mammo Previous; prior left breast US 1/2/24, prior right breast US 10/19/22     Findings:  All 4 quadrants of each breast were imaged as well as the retroareolar regions.        Left  There is a 10 mm oval, parallel, hypoechoic mass with circumscribed margins seen in the upper inner quadrant of the left breast at 11 o'clock, 9 cm from the nipple.   There is a 6 mm round, hypoechoic mass with indistinct margins seen in the left breast at 10 o'clock, 3 cm from the nipple. Neither has a distinct mammographic correlate. Neither is seen on prior US images. Biopsy of both these findings is recommended.  There are additionally numerous bilateral simple cysts and cyst clusters, the largest at 12 o'clock, 2 cmfn, measuring 3.8 cm.     Right  There is no evidence of suspicious mass or other abnormal finding in the right breast. There are numerous simple cysts and cyst clusters; a benign cyst with fluid/debris level is noted at 12 o'clock, 4 cmfn, measuring 6 mm.     Impression:  Left  Mass: Left breast 10 mm mass at the upper inner position and 11 o'clock. Assessment: 4A - Suspicious finding.  Ultrasound-guided biopsy is recommended.   Mass: Left breast 6 mm mass at 10 o'clock. Assessment: 4A - Suspicious finding. Ultrasound-guided biopsy is recommended.      Right  There is no sonographic evidence of malignancy in the right breast.     BI-RADS Category:   Overall: 4 - Suspicious    Recommendation:  Ultrasound-guided biopsy is recommended for the two solid-appearing masses in the upper inner left breast. This was discussed with the patient at the time of the exam.      Assessment:       1. Fibrocystic breast disease (FCBD), unspecified laterality    2. Nipple discharge in female    3. Breast pain in female    4. Encounter for other screening for malignant neoplasm of breast          Plan:   Axillary US was negative  Cyst aspiration did not improve breast pain. We discussed appropriate therapy below.     Discussed continue with OTC therapies such as acetaminophen or nonsteroidal anti-inflammatory drugs (NSAIDs). They can both be used to relieve breast pain. Topical NSAIDS such as OTC Diclofenac (Volteran) gel can be used. Other recommendations include use of a supportive bra. Wearing a soft, supportive bra at night prevents the breasts from pulling down on the chest wall. Some women obtain relief from application of warm compresses or ice packs. Also, referred for professional fitting of a supportive bra.      Discussed lifestyle recommendations such as decrease or elimination of caffeine. Also low-fat diet, high complex carbohydrate diet has been shown to be effective.       Alternative therapies such as Evening Primerose Oil (EPO) 1000mg twice daily has been found to be helpful for some patients, results are seen after 3-6 months.    The nipple dc is unchanged, this is less bothersome than the pain. She had left Diagnostic MMG today that was negative.   Discussed MRI at this time vs watchful waiting.   Pt would like to proceed with regular screening MMG in October 2025 and reassess at that time.   She is  concerned that she does not know her family hx 2/2 being adopted so she may be interested in MRI if no change at that time. We did not discuss genetic testing but she may qualify given unknown family hx.   Follow-up in October with FREDE.

## 2025-05-06 ENCOUNTER — HOSPITAL ENCOUNTER (OUTPATIENT)
Dept: RADIOLOGY | Facility: OTHER | Age: 40
Discharge: HOME | End: 2025-05-06
Attending: PHYSICIAN ASSISTANT
Payer: OTHER GOVERNMENT

## 2025-05-06 ENCOUNTER — OFFICE VISIT (OUTPATIENT)
Dept: OBSTETRICS AND GYNECOLOGY | Facility: CLINIC | Age: 40
End: 2025-05-06
Payer: OTHER GOVERNMENT

## 2025-05-06 VITALS
DIASTOLIC BLOOD PRESSURE: 83 MMHG | BODY MASS INDEX: 32.6 KG/M2 | HEIGHT: 65 IN | WEIGHT: 195.63 LBS | SYSTOLIC BLOOD PRESSURE: 130 MMHG | HEART RATE: 105 BPM

## 2025-05-06 DIAGNOSIS — N80.30 ENDOMETRIOSIS, PELVIC PERITONEUM: ICD-10-CM

## 2025-05-06 DIAGNOSIS — Z01.419 ENCOUNTER FOR GYNECOLOGICAL EXAMINATION WITHOUT ABNORMAL FINDING: Primary | ICD-10-CM

## 2025-05-06 DIAGNOSIS — Z12.4 PAP SMEAR FOR CERVICAL CANCER SCREENING: ICD-10-CM

## 2025-05-06 PROCEDURE — 76856 US EXAM PELVIC COMPLETE: CPT | Mod: TC

## 2025-05-06 PROCEDURE — 76856 US EXAM PELVIC COMPLETE: CPT | Mod: 26,,, | Performed by: RADIOLOGY

## 2025-05-06 PROCEDURE — 99999 PR PBB SHADOW E&M-EST. PATIENT-LVL V: CPT | Mod: PBBFAC,,, | Performed by: PHYSICIAN ASSISTANT

## 2025-05-06 PROCEDURE — 99215 OFFICE O/P EST HI 40 MIN: CPT | Mod: PBBFAC,25 | Performed by: PHYSICIAN ASSISTANT

## 2025-05-06 PROCEDURE — 87624 HPV HI-RISK TYP POOLED RSLT: CPT | Performed by: PHYSICIAN ASSISTANT

## 2025-05-06 PROCEDURE — 76830 TRANSVAGINAL US NON-OB: CPT | Mod: 26,,, | Performed by: RADIOLOGY

## 2025-05-06 RX ORDER — DULOXETIN HYDROCHLORIDE 30 MG/1
30 CAPSULE, DELAYED RELEASE ORAL
COMMUNITY
Start: 2025-04-17

## 2025-05-06 NOTE — PROGRESS NOTES
CC: Well woman exam    Luisa Jesus is a 40 y.o. female  presents for well woman exam.  LMP: Patient's last menstrual period was 2025. Continues to have regular, heavy and painful periods. History of endometriosis. Previously seen Dr. Martinez who prescribed mobic which helped. However, worries about risks with regular use of mobic. Abdominal bloating worse in the last 3-4 months. CT abdomen/pelvis on 1/10/2025 showed 3.9 cm right adnexal cyst. History of RA and lupus and feels inflamed. She is maintaining ant-inflammatory diet. Taking omega3, mg, tumemric and vitamin D3/K2. Unable to tolerate OCPs in the past. No UTI symptoms or changes in bowels.  She had a left breast cyst aspiration. Seeing breast surgery.     Mammogram: Scheduled, followed by breast surgery  Pap: 10/2023 with gyn, negative   PCP: Madelyn Peterson MD   Routine Screening Labs: 1/15/2025      Past Medical History:   Diagnosis Date    Allergy     Atrial fibrillation 10/01/2023    aproxx    General anesthetics causing adverse effect in therapeutic use     IBS (irritable bowel syndrome)     Rheumatoid arthritis, unspecified     SVT (supraventricular tachycardia)     Systemic lupus erythematosus, organ or system involvement unspecified     Thrush 2015    recurrent     Past Surgical History:   Procedure Laterality Date    BREAST BIOPSY Left 2024    CHOLECYSTECTOMY      Dx Laparoscopy      presumed endometriosis    EPIDURAL STEROID INJECTION INTO LUMBAR SPINE N/A 11/15/2024    Procedure: Injection-steroid-epidural-lumbar;  Surgeon: Konstantin Linder MD;  Location: [facility] OR;  Service: Pain Management;  Laterality: N/A;    ESOPHAGOGASTRODUODENOSCOPY N/A 2025    Procedure: EGD (ESOPHAGOGASTRODUODENOSCOPY);  Surgeon: Brian Pavon MD;  Location: [facility] ENDO;  Service: Endoscopy;  Laterality: N/A;    INJECTION OF ANESTHETIC AGENT AROUND MEDIAL BRANCH NERVES INNERVATING CERVICAL FACET JOINT Bilateral 3/7/2025     "Procedure: Block-nerve-medial branch-cervical;  Surgeon: Konstantin Linder MD;  Location: [facility] PAIN MANAGEMENT;  Service: Pain Management;  Laterality: Bilateral;    INJECTION OF ANESTHETIC AGENT AROUND MEDIAL BRANCH NERVES INNERVATING CERVICAL FACET JOINT Bilateral 2025    Procedure: Block-nerve-medial branch-cervical;  Surgeon: Konstantin Linder MD;  Location: [facility] PAIN MANAGEMENT;  Service: Pain Management;  Laterality: Bilateral;    RADIOFREQUENCY THERMAL COAGULATION OF MEDIAL BRANCH OF POSTERIOR RAMUS OF CERVICAL SPINAL NERVE Right 2025    Procedure: RADIOFREQUENCY THERMAL COAGULATION, NERVE, SPINAL, CERVICAL, POSTERIOR RAMUS, MEDIAL BRANCH;  Surgeon: Konstantin Linder MD;  Location: [facility] PAIN MANAGEMENT;  Service: Pain Management;  Laterality: Right;    SINUS SURGERY  2007    SPINE SURGERY      C spine    TONSILLECTOMY      VENTRICULAR ABLATION SURGERY      WISDOM TOOTH EXTRACTION       Social History[1]  Family History   Adopted: Yes   Problem Relation Name Age of Onset    Stroke Mother  53    Heart disease Maternal Grandmother      Eclampsia Neg Hx      Colon cancer Neg Hx       OB History          3    Para   3    Term   3            AB        Living             SAB        IAB        Ectopic        Multiple        Live Births                   Current Medications[2]    The 10-year ASCVD risk score (Alma ADAMS, et al., 2019) is: 0.4%    Values used to calculate the score:      Age: 40 years      Sex: Female      Is Non- : No      Diabetic: No      Tobacco smoker: No      Systolic Blood Pressure: 130 mmHg      Is BP treated: No      HDL Cholesterol: 89 mg/dL      Total Cholesterol: 236 mg/dL    /83 (Patient Position: Sitting)   Pulse 105   Ht 5' 5" (1.651 m)   Wt 88.7 kg (195 lb 9.6 oz)   LMP 2025   BMI 32.55 kg/m²       ROS:  GENERAL: Denies weight gain or weight loss. Feeling well overall.   SKIN: Denies " rash or lesions.   HEAD: Denies head injury or headache.   NODES: Denies enlarged lymph nodes.   CHEST: Denies chest pain or shortness of breath.   CARDIOVASCULAR: Denies palpitations or left sided chest pain.   ABDOMEN: No abdominal pain, constipation, diarrhea, nausea, vomiting or rectal bleeding.   URINARY: No frequency, dysuria, hematuria, or burning on urination.  REPRODUCTIVE: See HPI.   BREASTS: Denies pain, lumps, or nipple discharge.   HEMATOLOGIC: No easy bruisability or excessive bleeding.   MUSCULOSKELETAL: +pain  NEUROLOGIC: Denies syncope or weakness.   PSYCHIATRIC: Denies depression, anxiety or mood swings.    PHYSICAL EXAM:  APPEARANCE: Well nourished, well developed, in no acute distress.  AFFECT: WNL, alert and oriented x 3  CHEST: Good respiratory effect  ABDOMEN: Soft.  No tenderness or masses.  No hepatosplenomegaly.  No hernias.  BREASTS: Symmetrical, no skin changes or visible lesions.  No palpable masses, nipple discharge bilaterally.  PELVIC: Normal external genitalia without lesions.  Normal hair distribution.  Adequate perineal body, normal urethral meatus.  Vagina moist and well rugated without lesions or discharge.  Cervix pink, without lesions, discharge or tenderness.  No significant cystocele or rectocele.  Bimanual exam shows uterus to be normal size, regular, mobile and nontender.  Adnexa without masses or tenderness.    EXTREMITIES: No edema.    ASSESSMENT:   Encounter for gynecological examination without abnormal finding    Pap smear for cervical cancer screening  -     Liquid-Based Pap Smear, Screening    Endometriosis, pelvic peritoneum  -     US Pelvis Comp with Transvag NON-OB (xpd); Future; Expected date: 05/06/2025          PLAN:   Pap/HPV  Annual screening mammogram scheduled  Pelvic US  Message to Dr. Martinez's staff for follow up of endometriosis  Reviewed supplements  Follow up annually or sooner PRN    Patient was counseled today on A.C.S. Pap guidelines and  recommendations for yearly pelvic exams, mammograms and monthly self breast exams; to see her PCP for other health maintenance.                              [1]   Social History  Socioeconomic History    Marital status:     Number of children: 3   Occupational History    Occupation: back in school for nutrition   Tobacco Use    Smoking status: Never    Smokeless tobacco: Never   Substance and Sexual Activity    Alcohol use: Yes     Comment: rarely    Drug use: No    Sexual activity: Yes     Partners: Male     Birth control/protection: None     Social Drivers of Health     Financial Resource Strain: Low Risk  (4/9/2025)    Overall Financial Resource Strain (CARDIA)     Difficulty of Paying Living Expenses: Not hard at all   Recent Concern: Financial Resource Strain - Medium Risk (3/24/2025)    Received from [facility]    Overall Financial Resource Strain (CARDIA)     Difficulty of Paying Living Expenses: Somewhat hard   Food Insecurity: No Food Insecurity (4/9/2025)    Hunger Vital Sign     Worried About Running Out of Food in the Last Year: Never true     Ran Out of Food in the Last Year: Never true   Transportation Needs: No Transportation Needs (4/9/2025)    PRAPARE - Transportation     Lack of Transportation (Medical): No     Lack of Transportation (Non-Medical): No   Physical Activity: Sufficiently Active (4/9/2025)    Exercise Vital Sign     Days of Exercise per Week: 4 days     Minutes of Exercise per Session: 60 min   Stress: No Stress Concern Present (4/9/2025)    [employer] - Occupational Stress Questionnaire     Feeling of Stress : Only a little   Recent Concern: Stress - Stress Concern Present (3/24/2025)    Received from [facility]    [employer] - Occupational Stress Questionnaire     Feeling of Stress : To some extent   Housing Stability: Low Risk  (4/9/2025)    Housing Stability Vital Sign     Unable to Pay for Housing in the Last  Year: No     Number of Times Moved in the Last Year: 0     Homeless in the Last Year: No   [2]   Current Outpatient Medications:     acetaminophen (TYLENOL ARTHRITIS PAIN ORAL), Take by mouth., Disp: , Rfl:     aspirin 81 MG Chew, , Disp: , Rfl:     betamethasone dipropionate (DIPROLENE) 0.05 % ointment, Apply topically., Disp: , Rfl:     bran/gum/fib/steven/psyl/kelp/pec (FIBER 6 ORAL), , Disp: , Rfl:     CHOLECALCIFEROL, VITAMIN D3, (VITAMIN D3 ORAL), Take by mouth., Disp: , Rfl:     diltiaZEM (CARDIZEM CD) 240 MG 24 hr capsule, Take 1 capsule (240 mg total) by mouth once daily., Disp: 90 capsule, Rfl: 3    DULoxetine (CYMBALTA) 30 MG capsule, Take 30 mg by mouth., Disp: , Rfl:     DULoxetine (CYMBALTA) 60 MG capsule, Take 1 capsule (60 mg total) by mouth once daily. (Patient taking differently: Take 30 mg by mouth once daily.), Disp: 30 capsule, Rfl: 11    etanercept (ENBREL SUBQ), Inject into the skin., Disp: , Rfl:     famotidine (PEPCID) 40 MG tablet, Take 1 tablet (40 mg total) by mouth nightly as needed for Heartburn., Disp: 30 tablet, Rfl: 11    FLONASE ALLERGY RELIEF 50 mcg/actuation nasal spray, , Disp: , Rfl:     GI cocktail antac/dicyc/lidoc, 30 ml  4 times a day  AC/HS PRN abdominal pain and burning, Disp: 200 mL, Rfl: 0    linaCLOtide (LINZESS) 290 mcg Cap capsule, Take 1 capsule (290 mcg total) by mouth before breakfast., Disp: 30 capsule, Rfl: 11    magnesium oxide 200 mg magnesium Chew, Take by mouth., Disp: , Rfl:     methocarbamoL (ROBAXIN) 500 MG Tab, TAKE 2 TABLETS(1000 MG) BY MOUTH EVERY EVENING, Disp: 60 tablet, Rfl: 5    multivit with calcium,iron,min (MULTIVITAMIN-CALCIUM AND IRON ORAL), , Disp: , Rfl:     omeprazole (PRILOSEC) 40 MG capsule, Take 1 capsule (40 mg total) by mouth every morning., Disp: 90 capsule, Rfl: 3    prochlorperazine (COMPAZINE) 10 MG tablet, Take 1 tablet (10 mg total) by mouth 3 (three) times daily as needed (nausea)., Disp: 15 tablet, Rfl: 0    turmeric root extract  500 mg Cap, , Disp: , Rfl:

## 2025-05-07 ENCOUNTER — TELEPHONE (OUTPATIENT)
Dept: OBSTETRICS AND GYNECOLOGY | Facility: CLINIC | Age: 40
End: 2025-05-07
Payer: OTHER GOVERNMENT

## 2025-05-07 ENCOUNTER — RESULTS FOLLOW-UP (OUTPATIENT)
Dept: OBSTETRICS AND GYNECOLOGY | Facility: CLINIC | Age: 40
End: 2025-05-07

## 2025-05-07 NOTE — TELEPHONE ENCOUNTER
----- Message from Med Assistant Dimple sent at 5/6/2025  5:05 PM CDT -----  Alisson Cook, MIRELLA DELEON StaffHi!Laurie needs a follow up with Dr. Martinez scheduled for endometriosis please. Thank you!Alisson

## 2025-05-20 ENCOUNTER — PATIENT MESSAGE (OUTPATIENT)
Dept: GASTROENTEROLOGY | Facility: CLINIC | Age: 40
End: 2025-05-20
Payer: OTHER GOVERNMENT

## 2025-05-20 DIAGNOSIS — R10.11 RIGHT UPPER QUADRANT ABDOMINAL PAIN: ICD-10-CM

## 2025-05-20 RX ORDER — PROCHLORPERAZINE MALEATE 10 MG
TABLET ORAL
Qty: 15 TABLET | Refills: 0 | Status: SHIPPED | OUTPATIENT
Start: 2025-05-20

## 2025-05-30 ENCOUNTER — HOSPITAL ENCOUNTER (OUTPATIENT)
Facility: HOSPITAL | Age: 40
Discharge: HOME | End: 2025-05-30
Attending: STUDENT IN AN ORGANIZED HEALTH CARE EDUCATION/TRAINING PROGRAM | Admitting: STUDENT IN AN ORGANIZED HEALTH CARE EDUCATION/TRAINING PROGRAM
Payer: OTHER GOVERNMENT

## 2025-05-30 DIAGNOSIS — M47.892 OTHER SPONDYLOSIS, CERVICAL REGION: ICD-10-CM

## 2025-05-30 LAB
B-HCG UR QL: NEGATIVE
CTP QC/QA: YES

## 2025-05-30 PROCEDURE — 64633 DESTROY CERV/THOR FACET JNT: CPT | Mod: LT,,, | Performed by: STUDENT IN AN ORGANIZED HEALTH CARE EDUCATION/TRAINING PROGRAM

## 2025-05-30 PROCEDURE — 64633 DESTROY CERV/THOR FACET JNT: CPT | Mod: LT | Performed by: STUDENT IN AN ORGANIZED HEALTH CARE EDUCATION/TRAINING PROGRAM

## 2025-05-30 PROCEDURE — 81025 URINE PREGNANCY TEST: CPT | Performed by: STUDENT IN AN ORGANIZED HEALTH CARE EDUCATION/TRAINING PROGRAM

## 2025-05-30 PROCEDURE — 63600175 PHARM REV CODE 636 W HCPCS: Performed by: STUDENT IN AN ORGANIZED HEALTH CARE EDUCATION/TRAINING PROGRAM

## 2025-05-30 PROCEDURE — 64634 DESTROY C/TH FACET JNT ADDL: CPT | Mod: LT,,, | Performed by: STUDENT IN AN ORGANIZED HEALTH CARE EDUCATION/TRAINING PROGRAM

## 2025-05-30 PROCEDURE — 99152 MOD SED SAME PHYS/QHP 5/>YRS: CPT | Performed by: STUDENT IN AN ORGANIZED HEALTH CARE EDUCATION/TRAINING PROGRAM

## 2025-05-30 PROCEDURE — 25000003 PHARM REV CODE 250: Performed by: STUDENT IN AN ORGANIZED HEALTH CARE EDUCATION/TRAINING PROGRAM

## 2025-05-30 PROCEDURE — 64634 DESTROY C/TH FACET JNT ADDL: CPT | Mod: LT | Performed by: STUDENT IN AN ORGANIZED HEALTH CARE EDUCATION/TRAINING PROGRAM

## 2025-05-30 RX ORDER — SODIUM CHLORIDE, SODIUM LACTATE, POTASSIUM CHLORIDE, CALCIUM CHLORIDE 600; 310; 30; 20 MG/100ML; MG/100ML; MG/100ML; MG/100ML
INJECTION, SOLUTION INTRAVENOUS CONTINUOUS
Status: DISCONTINUED | OUTPATIENT
Start: 2025-05-30 | End: 2025-05-30 | Stop reason: HOSPADM

## 2025-05-30 RX ORDER — LIDOCAINE HYDROCHLORIDE 10 MG/ML
1 INJECTION, SOLUTION EPIDURAL; INFILTRATION; INTRACAUDAL; PERINEURAL ONCE
Status: DISCONTINUED | OUTPATIENT
Start: 2025-05-30 | End: 2025-05-30 | Stop reason: HOSPADM

## 2025-05-30 RX ORDER — FENTANYL CITRATE 50 UG/ML
INJECTION, SOLUTION INTRAMUSCULAR; INTRAVENOUS
Status: DISCONTINUED | OUTPATIENT
Start: 2025-05-30 | End: 2025-05-30 | Stop reason: HOSPADM

## 2025-05-30 RX ORDER — ETHYL CHLORIDE 100 %
AEROSOL, SPRAY (ML) TOPICAL
Status: DISCONTINUED | OUTPATIENT
Start: 2025-05-30 | End: 2025-05-30 | Stop reason: HOSPADM

## 2025-05-30 RX ORDER — LIDOCAINE HYDROCHLORIDE 20 MG/ML
INJECTION, SOLUTION EPIDURAL; INFILTRATION; INTRACAUDAL; PERINEURAL
Status: DISCONTINUED | OUTPATIENT
Start: 2025-05-30 | End: 2025-05-30 | Stop reason: HOSPADM

## 2025-05-30 RX ORDER — ONDANSETRON HYDROCHLORIDE 2 MG/ML
4 INJECTION, SOLUTION INTRAVENOUS ONCE
Status: COMPLETED | OUTPATIENT
Start: 2025-05-30 | End: 2025-05-30

## 2025-05-30 RX ORDER — LIDOCAINE HYDROCHLORIDE 10 MG/ML
INJECTION, SOLUTION EPIDURAL; INFILTRATION; INTRACAUDAL; PERINEURAL
Status: DISCONTINUED | OUTPATIENT
Start: 2025-05-30 | End: 2025-05-30 | Stop reason: HOSPADM

## 2025-05-30 RX ORDER — MIDAZOLAM HYDROCHLORIDE 1 MG/ML
INJECTION INTRAMUSCULAR; INTRAVENOUS
Status: DISCONTINUED | OUTPATIENT
Start: 2025-05-30 | End: 2025-05-30 | Stop reason: HOSPADM

## 2025-05-30 RX ORDER — METHYLPREDNISOLONE ACETATE 80 MG/ML
INJECTION, SUSPENSION INTRA-ARTICULAR; INTRALESIONAL; INTRAMUSCULAR; SOFT TISSUE
Status: DISCONTINUED | OUTPATIENT
Start: 2025-05-30 | End: 2025-05-30 | Stop reason: HOSPADM

## 2025-05-30 RX ORDER — BUPIVACAINE HYDROCHLORIDE 2.5 MG/ML
INJECTION, SOLUTION EPIDURAL; INFILTRATION; INTRACAUDAL; PERINEURAL
Status: DISCONTINUED | OUTPATIENT
Start: 2025-05-30 | End: 2025-05-30 | Stop reason: HOSPADM

## 2025-05-30 RX ADMIN — Medication 1 SPRAY: at 10:05

## 2025-05-30 RX ADMIN — ONDANSETRON 4 MG: 2 INJECTION INTRAMUSCULAR; INTRAVENOUS at 10:05

## 2025-06-05 VITALS
HEART RATE: 72 BPM | WEIGHT: 195.56 LBS | BODY MASS INDEX: 32.58 KG/M2 | OXYGEN SATURATION: 100 % | HEIGHT: 65 IN | RESPIRATION RATE: 17 BRPM | TEMPERATURE: 98 F | SYSTOLIC BLOOD PRESSURE: 125 MMHG | DIASTOLIC BLOOD PRESSURE: 64 MMHG

## 2025-06-11 ENCOUNTER — PATIENT MESSAGE (OUTPATIENT)
Dept: GASTROENTEROLOGY | Facility: CLINIC | Age: 40
End: 2025-06-11
Payer: OTHER GOVERNMENT

## 2025-06-12 ENCOUNTER — PATIENT MESSAGE (OUTPATIENT)
Dept: PAIN MEDICINE | Facility: CLINIC | Age: 40
End: 2025-06-12
Payer: OTHER GOVERNMENT

## 2025-06-12 DIAGNOSIS — M79.2 NEURITIS: Primary | ICD-10-CM

## 2025-06-12 RX ORDER — TIZANIDINE 2 MG/1
2 TABLET ORAL NIGHTLY PRN
Qty: 28 TABLET | Refills: 0 | Status: SHIPPED | OUTPATIENT
Start: 2025-06-12 | End: 2025-06-26

## 2025-06-12 RX ORDER — TRAMADOL HYDROCHLORIDE 50 MG/1
50 TABLET, FILM COATED ORAL EVERY 8 HOURS PRN
Qty: 21 TABLET | Refills: 0 | Status: SHIPPED | OUTPATIENT
Start: 2025-06-12

## 2025-06-12 RX ORDER — NABUMENTONE 750 MG/1
750 TABLET ORAL 2 TIMES DAILY PRN
Qty: 28 TABLET | Refills: 0 | Status: SHIPPED | OUTPATIENT
Start: 2025-06-12 | End: 2025-06-26

## 2025-06-12 NOTE — PROGRESS NOTES
Spoke to patient on the phone. 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 No worrisome complaints. RFA two weeks ago. Ibuprofen and topical lido patch not very helpful. Too much steroid this year to give a dose stevo. Plan as follows:  Stop ibuprofen. Trial Relafen 750mg BID x 2 weeks  Trial tizanidine 2-4mg QHS PRN  Tramadol 50mg TID PRN disp 21 tablets 0 refills  Contact me with any difficulties or further questions/concerns.   Konstantin Linder MD

## 2025-06-17 ENCOUNTER — OFFICE VISIT (OUTPATIENT)
Dept: HEPATOLOGY | Facility: CLINIC | Age: 40
End: 2025-06-17
Payer: OTHER GOVERNMENT

## 2025-06-17 ENCOUNTER — LAB VISIT (OUTPATIENT)
Dept: LAB | Facility: HOSPITAL | Age: 40
End: 2025-06-17
Attending: INTERNAL MEDICINE
Payer: OTHER GOVERNMENT

## 2025-06-17 VITALS
DIASTOLIC BLOOD PRESSURE: 73 MMHG | RESPIRATION RATE: 18 BRPM | TEMPERATURE: 99 F | HEIGHT: 65 IN | WEIGHT: 200.38 LBS | SYSTOLIC BLOOD PRESSURE: 132 MMHG | HEART RATE: 76 BPM | OXYGEN SATURATION: 96 % | BODY MASS INDEX: 33.38 KG/M2

## 2025-06-17 DIAGNOSIS — R17 JAUNDICE: ICD-10-CM

## 2025-06-17 DIAGNOSIS — R10.9 ABDOMINAL PAIN, UNSPECIFIED ABDOMINAL LOCATION: ICD-10-CM

## 2025-06-17 DIAGNOSIS — R10.9 ABDOMINAL PAIN, UNSPECIFIED ABDOMINAL LOCATION: Primary | ICD-10-CM

## 2025-06-17 LAB
ABSOLUTE EOSINOPHIL (OHS): 0.22 K/UL
ABSOLUTE MONOCYTE (OHS): 0.43 K/UL (ref 0.3–1)
ABSOLUTE NEUTROPHIL COUNT (OHS): 3.07 K/UL (ref 1.8–7.7)
AFP SERPL-MCNC: 13.3 NG/ML
ALBUMIN SERPL BCP-MCNC: 4.2 G/DL (ref 3.5–5.2)
ALP SERPL-CCNC: 87 UNIT/L (ref 40–150)
ALT SERPL W/O P-5'-P-CCNC: 20 UNIT/L (ref 10–44)
ANION GAP (OHS): 9 MMOL/L (ref 8–16)
AST SERPL-CCNC: 22 UNIT/L (ref 11–45)
BASOPHILS # BLD AUTO: 0.03 K/UL
BASOPHILS NFR BLD AUTO: 0.5 %
BILIRUB SERPL-MCNC: 0.2 MG/DL (ref 0.1–1)
BUN SERPL-MCNC: 14 MG/DL (ref 6–20)
CALCIUM SERPL-MCNC: 9 MG/DL (ref 8.7–10.5)
CHLORIDE SERPL-SCNC: 105 MMOL/L (ref 95–110)
CO2 SERPL-SCNC: 26 MMOL/L (ref 23–29)
CREAT SERPL-MCNC: 0.7 MG/DL (ref 0.5–1.4)
ERYTHROCYTE [DISTWIDTH] IN BLOOD BY AUTOMATED COUNT: 13.7 % (ref 11.5–14.5)
GFR SERPLBLD CREATININE-BSD FMLA CKD-EPI: >60 ML/MIN/1.73/M2
GLUCOSE SERPL-MCNC: 95 MG/DL (ref 70–110)
HCT VFR BLD AUTO: 34.5 % (ref 37–48.5)
HGB BLD-MCNC: 10.4 GM/DL (ref 12–16)
IGA SERPL-MCNC: 190 MG/DL (ref 40–350)
IGG SERPL-MCNC: 901 MG/DL (ref 650–1600)
IGM SERPL-MCNC: 101 MG/DL (ref 50–300)
IMM GRANULOCYTES # BLD AUTO: 0.02 K/UL (ref 0–0.04)
IMM GRANULOCYTES NFR BLD AUTO: 0.4 % (ref 0–0.5)
INR PPP: 1 (ref 0.8–1.2)
LYMPHOCYTES # BLD AUTO: 1.71 K/UL (ref 1–4.8)
MCH RBC QN AUTO: 25.3 PG (ref 27–31)
MCHC RBC AUTO-ENTMCNC: 30.1 G/DL (ref 32–36)
MCV RBC AUTO: 84 FL (ref 82–98)
NUCLEATED RBC (/100WBC) (OHS): 0 /100 WBC
PLATELET # BLD AUTO: 345 K/UL (ref 150–450)
PMV BLD AUTO: 10.6 FL (ref 9.2–12.9)
POTASSIUM SERPL-SCNC: 4.1 MMOL/L (ref 3.5–5.1)
PROT SERPL-MCNC: 6.9 GM/DL (ref 6–8.4)
PROTHROMBIN TIME: 10.5 SECONDS (ref 9–12.5)
RBC # BLD AUTO: 4.11 M/UL (ref 4–5.4)
RELATIVE EOSINOPHIL (OHS): 4 %
RELATIVE LYMPHOCYTE (OHS): 31.2 % (ref 18–48)
RELATIVE MONOCYTE (OHS): 7.8 % (ref 4–15)
RELATIVE NEUTROPHIL (OHS): 56.1 % (ref 38–73)
SODIUM SERPL-SCNC: 140 MMOL/L (ref 136–145)
WBC # BLD AUTO: 5.48 K/UL (ref 3.9–12.7)

## 2025-06-17 PROCEDURE — 86015 ACTIN ANTIBODY EACH: CPT

## 2025-06-17 PROCEDURE — 86381 MITOCHONDRIAL ANTIBODY EACH: CPT

## 2025-06-17 PROCEDURE — 36415 COLL VENOUS BLD VENIPUNCTURE: CPT

## 2025-06-17 PROCEDURE — 86376 MICROSOMAL ANTIBODY EACH: CPT

## 2025-06-17 PROCEDURE — 82105 ALPHA-FETOPROTEIN SERUM: CPT

## 2025-06-17 PROCEDURE — 82784 ASSAY IGA/IGD/IGG/IGM EACH: CPT | Mod: 59

## 2025-06-17 PROCEDURE — 99999 PR PBB SHADOW E&M-EST. PATIENT-LVL V: CPT | Mod: PBBFAC,,, | Performed by: INTERNAL MEDICINE

## 2025-06-17 PROCEDURE — 86038 ANTINUCLEAR ANTIBODIES: CPT

## 2025-06-17 PROCEDURE — 99215 OFFICE O/P EST HI 40 MIN: CPT | Mod: S$PBB,,, | Performed by: INTERNAL MEDICINE

## 2025-06-17 PROCEDURE — 99215 OFFICE O/P EST HI 40 MIN: CPT | Mod: PBBFAC | Performed by: INTERNAL MEDICINE

## 2025-06-17 PROCEDURE — 82040 ASSAY OF SERUM ALBUMIN: CPT

## 2025-06-17 PROCEDURE — 85025 COMPLETE CBC W/AUTO DIFF WBC: CPT

## 2025-06-17 PROCEDURE — 85610 PROTHROMBIN TIME: CPT

## 2025-06-17 NOTE — PATIENT INSTRUCTIONS
Recommendations:  -  Labs today and every 3 months: CBC, CMP, PT INR  -  Labs today only AFP, CA 19-9, ALEXIS, ASMA, AMA, anti-LKM antibody  -  MRI MRCP  to evaluate for biliary obstruction/strictures   -  Continue current meds  -  Avoid alcohol, smoking, sedatives and meds with codeine.  -  Avoid high intake of Tylenol (more than 4 extra-strength pills in one day)  -  Call us if any bleeding, fevers, confusion, disorientation occur  -  Endoscopy: possible ERCP in the future  -  Transplant option not discussed, will evaluate if there is decompensation  -  Education provided: liver disease, cirrhosis, HCC, nutrition, monitoring and follow-up.   -  Return in 1 Month.

## 2025-06-17 NOTE — PROGRESS NOTES
"   [facility] Consultation Note    Reason for Consult:  The primary encounter diagnosis was Abdominal pain, unspecified abdominal location. A diagnosis of Jaundice was also pertinent to this visit.    PCP: No, Primary Doctor   No address on file    HPI:  This is a 40 y.o. female here for evaluation of: referral for "Pain upper right side for years"    RUQ abd pain, itching, leg swelling, abd swelling. Takes Linzess because Embrel (given for RA) gave her gastroparesis.     Labs; 3/27/25  CBC normal, CMP normal from 2014 to 3/17/25.   ALEXIS negative <1:160 -- her rheumatologist Lupus and RA, currently on Cosentix      CT abd w IV con 1/10/25:  The liver is normal in size and attenuation with no focal hepatic abnormality.  Gallbladder is surgically absent.  There is minimal intra and extrahepatic biliary ductal dilatation most likely relating to post cholecystectomy status.   The stomach, spleen, pancreas, and adrenal glands are unremarkable.   The kidneys are normal in size and location and enhance symmetrically.  There is no evidence of hydronephrosis. The urinary bladder is unremarkable. The uterus demonstrates no significant abnormality.  There is a 3.9 cm right adnexal cyst.  Left ovary appears within normal limits.  No significant free fluid in the pelvis.   The abdominal aorta is normal in course and caliber without significant atherosclerotic calcifications.   The visualized loops of small and large bowel show no evidence of obstruction or inflammation. The appendix is not definitively visualized, however no localized inflammatory change is seen at its expected location. There is no ascites, free fluid, or intraperitoneal free air. There are scattered shotty small mesenteric and retroperitoneal lymph nodes.   The visualized osseous structures appear intact.  The extraperitoneal soft tissues are unremarkable.   Impression:   3.9 cm right adnexal cyst.  No significant free fluid in the pelvis.   " Cholecystectomy.  Minimal intra and extrahepatic biliary ductal dilatation, most likely relating to post cholecystectomy status.       CT abd w IV con 3/27/25:  Liver: Normal in size and attenuation, with no focal hepatic lesions.   Gallbladder: Cholecystectomy.   Bile Ducts: No evidence of dilated ducts.   Pancreas: No mass or peripancreatic fat stranding.  Spleen: Unremarkable.    To do list:  Acute hep panel - was done outside.   ALEXIS, ASMA, AMA    MRI MRCP  HIDA if MRCP indicates any biliary flow problem  ERCP if no clear answer from the above test of there is a biliary problem.     Elevated liver enzymes: No  Abnormal imaging: Yes dilated intra and extrahepatic biliary dilation  Cirrhosis: No  Hepatitis C: No by history  Hepatitis B: No by history   Fatty liver: No  Encephalopathy: No  Post-hospital discharge: No  Symptoms: RUQ abd pain, no different     Primary hepatic manifestations:  Fatigue:Extreme   Edema:Yes  Ascites:No  Encephalopathy:No  Abdominal pain:Yes  GI bleeds: No  Pruritus:yes  Weight Changes:Yes  Changes in Bowel habits: Yes  Muscle cramps:Yes    Risk factors for liver disease:  No jaundice  No transfusions  No IVDU  Did not snort cocaine or similar agents  Did not live with anyone with hepatitis B or C  Sexual partner not tested  No hepatotoxic medications  No exposure to industrial toxins  Alcohol:       ROS:  Constitutional: No fevers, chills, weight changes, fatigue  ENT: No allergies, nosebleeds,   CV: No chest pain  Pulm: No cough, shortness of breath  Ophtho: No vision changes  GI/Liver: see HPI  Derm: No rash, itching  Heme: No swollen glands, bruising  MSK: No joint pains, joint swelling  : No dysuria, hematuria, decrease in urine output  Endo: No hot or cold intolerance  Neuro: No confusion, disorientation, difficulty with sleep, memory, concentration, syncope, seizure  Psych: No anxiety, depression    Medical History:  has a past medical history of Allergy, Atrial fibrillation  (10/01/2023), General anesthetics causing adverse effect in therapeutic use, IBS (irritable bowel syndrome), Rheumatoid arthritis, unspecified, SVT (supraventricular tachycardia), Systemic lupus erythematosus, organ or system involvement unspecified, and Thrush (01/22/2015).    Surgical History:  has a past surgical history that includes Cholecystectomy; Tonsillectomy; Spine surgery; Sinus surgery (01/01/2007); [place] tooth extraction; Dx Laparoscopy; Ventricular ablation surgery; Epidural steroid injection into lumbar spine (N/A, 11/15/2024); Esophagogastroduodenoscopy (N/A, 02/11/2025); Breast biopsy (Left, 11/2024); Injection of anesthetic agent around medial branch nerves innervating cervical facet joint (Bilateral, 3/7/2025); Injection of anesthetic agent around medial branch nerves innervating cervical facet joint (Bilateral, 4/4/2025); Radiofrequency thermal coagulation of medial branch of posterior ramus of cervical spinal nerve (Right, 4/30/2025); and Radiofrequency thermal coagulation of medial branch of posterior ramus of cervical spinal nerve (Left, 5/30/2025).    Family History: family history includes Heart disease in her maternal grandmother; Stroke (age of onset: 53) in her mother. She was adopted..     Social History:  reports that she has never smoked. She has never used smokeless tobacco. She reports current alcohol use. She reports that she does not use drugs.    Review of patient's allergies indicates:   Allergen Reactions    Diclofenac sodium Other (See Comments)     Other reaction(s): Bloody stool    Ortho tri-cyclen (21) Hives    Kevzara [sarilumab]      [place] palsy type of reaction    Sulfa (sulfonamide antibiotics) Hives and Rash    Scopolamine     Sulfur Hives    Bactrim [sulfamethoxazole-trimethoprim] Rash    Tocilizumab Rash       Current Outpatient Rx   Medication Sig Dispense Refill    acetaminophen (TYLENOL ARTHRITIS PAIN ORAL) Take by mouth.      aspirin 81 MG Chew        "betamethasone dipropionate (DIPROLENE) 0.05 % ointment Apply topically.      bran/gum/fib/steven/psyl/kelp/pec (FIBER 6 ORAL)       CHOLECALCIFEROL, VITAMIN D3, (VITAMIN D3 ORAL) Take by mouth.      diltiaZEM (CARDIZEM CD) 240 MG 24 hr capsule Take 1 capsule (240 mg total) by mouth once daily. 90 capsule 3    famotidine (PEPCID) 40 MG tablet Take 1 tablet (40 mg total) by mouth nightly as needed for Heartburn. 30 tablet 11    FLONASE ALLERGY RELIEF 50 mcg/actuation nasal spray       GI cocktail antac/dicyc/lidoc 30 ml  4 times a day  AC/HS PRN abdominal pain and burning 200 mL 0    linaCLOtide (LINZESS) 290 mcg Cap capsule Take 1 capsule (290 mcg total) by mouth before breakfast. 30 capsule 11    magnesium oxide 200 mg magnesium Chew Take by mouth.      methocarbamoL (ROBAXIN) 500 MG Tab TAKE 2 TABLETS(1000 MG) BY MOUTH EVERY EVENING 60 tablet 5    multivit with calcium,iron,min (MULTIVITAMIN-CALCIUM AND IRON ORAL)       nabumetone (RELAFEN) 750 MG tablet Take 1 tablet (750 mg total) by mouth 2 (two) times daily as needed for Pain. 28 tablet 0    omeprazole (PRILOSEC) 40 MG capsule Take 1 capsule (40 mg total) by mouth every morning. 90 capsule 3    prochlorperazine (COMPAZINE) 10 MG tablet TAKE 1 TABLET(10 MG) BY MOUTH THREE TIMES DAILY AS NEEDED FOR NAUSEA 15 tablet 0    tiZANidine (ZANAFLEX) 2 MG tablet Take 1 tablet (2 mg total) by mouth nightly as needed (pain). May take 2 tablets if necessary. 28 tablet 0    traMADoL (ULTRAM) 50 mg tablet Take 1 tablet (50 mg total) by mouth every 8 (eight) hours as needed for Pain. 21 tablet 0    turmeric root extract 500 mg Cap          Objective Findings:    Vital Signs:  /73 (BP Location: Right arm, Patient Position: Sitting)   Pulse 76   Temp 99.1 °F (37.3 °C) (Oral)   Resp 18   Ht 5' 5" (1.651 m)   Wt 90.9 kg (200 lb 6.4 oz)   SpO2 96%   BMI 33.35 kg/m²   Body mass index is 33.35 kg/m².    Physical Exam:  General Appearance: Well appearing in no acute " distress  Head:   Normocephalic, without obvious abnormality  Eyes:    No scleral icterus, EOMI  ENT: Neck supple, Lips, mucosa, and tongue normal; teeth and gums normal  Lungs: CTA bilaterally in anterior and posterior fields, no wheezes, no crackles.  Heart:  Regular rate and rhythm, S1, S2 normal, no murmurs heard  Abdomen: Soft, non tender, non distended with positive bowel sounds in all four quadrants. No hepatosplenomegaly, ascites, or mass  Extremities: 2+ pulses, no clubbing, cyanosis or edema  Skin: No rash  Neurologic: CN II-XII intact, alert, oriented x 3. No asterixis      Labs:  Lab Results   Component Value Date    WBC 8.34 03/27/2025    HGB 11.5 (L) 03/27/2025    HCT 36.3 (L) 03/27/2025     03/27/2025    CHOL 236 (H) 01/15/2025    TRIG 78 01/15/2025    HDL 89 (H) 01/15/2025    INR 1.0 01/15/2025    CREATININE 0.8 03/27/2025    BUN 19 03/27/2025    BILITOT 0.2 03/27/2025    ALT 19 03/27/2025    AST 21 03/27/2025    ALKPHOS 74 03/27/2025     03/27/2025    K 4.5 03/27/2025     03/27/2025    CO2 26 03/27/2025    TSH 0.912 02/18/2025    HGBA1C 5.2 09/22/2014         Current Meds:  Current Outpatient Medications   Medication Sig    acetaminophen (TYLENOL ARTHRITIS PAIN ORAL) Take by mouth.    aspirin 81 MG Chew     betamethasone dipropionate (DIPROLENE) 0.05 % ointment Apply topically.    bran/gum/fib/steven/psyl/kelp/pec (FIBER 6 ORAL)     CHOLECALCIFEROL, VITAMIN D3, (VITAMIN D3 ORAL) Take by mouth.    diltiaZEM (CARDIZEM CD) 240 MG 24 hr capsule Take 1 capsule (240 mg total) by mouth once daily.    famotidine (PEPCID) 40 MG tablet Take 1 tablet (40 mg total) by mouth nightly as needed for Heartburn.    FLONASE ALLERGY RELIEF 50 mcg/actuation nasal spray     GI cocktail antac/dicyc/lidoc 30 ml  4 times a day  AC/HS PRN abdominal pain and burning    linaCLOtide (LINZESS) 290 mcg Cap capsule Take 1 capsule (290 mcg total) by mouth before breakfast.    magnesium oxide 200 mg magnesium Chew  Take by mouth.    methocarbamoL (ROBAXIN) 500 MG Tab TAKE 2 TABLETS(1000 MG) BY MOUTH EVERY EVENING    multivit with calcium,iron,min (MULTIVITAMIN-CALCIUM AND IRON ORAL)     nabumetone (RELAFEN) 750 MG tablet Take 1 tablet (750 mg total) by mouth 2 (two) times daily as needed for Pain.    omeprazole (PRILOSEC) 40 MG capsule Take 1 capsule (40 mg total) by mouth every morning.    prochlorperazine (COMPAZINE) 10 MG tablet TAKE 1 TABLET(10 MG) BY MOUTH THREE TIMES DAILY AS NEEDED FOR NAUSEA    tiZANidine (ZANAFLEX) 2 MG tablet Take 1 tablet (2 mg total) by mouth nightly as needed (pain). May take 2 tablets if necessary.    traMADoL (ULTRAM) 50 mg tablet Take 1 tablet (50 mg total) by mouth every 8 (eight) hours as needed for Pain.    turmeric root extract 500 mg Cap      No current facility-administered medications for this visit.        Imaging:       Endoscopy:      Assessment:  1. Abdominal pain, unspecified abdominal location    2. Jaundice       RUQ pain for many (at least 10) yrs.   S/p cholecystectomy, with intra- and extrahepatic biliary dilation, asis seen post aba.      Recommendations:  -  Labs today and every 3 months: CBC, CMP, PT INR  -  Labs today only AFP, CA 19-9, ALEXIS, ASMA, AMA, anti-LKM antibody  -  MRI MRCP  to evaluate for biliary obstruction/strictures   -  Continue current meds  -  Avoid alcohol, smoking, sedatives and meds with codeine.  -  Avoid high intake of Tylenol (more than 4 extra-strength pills in one day)  -  Call us if any bleeding, fevers, confusion, disorientation occur  -  Endoscopy: possible ERCP in the future  -  Transplant option not discussed, will evaluate if there is decompensation  -  Education provided: liver disease, cirrhosis, HCC, nutrition, monitoring and follow-up.   -  Return in 1 Month.       Follow up in about 1 month (around 7/17/2025).      Order summary:  Orders Placed This Encounter   Procedures    MRI MRCP Abdomen W WO Contrast 3D WO Independent WS XPD    AFP  Tumor Marker    ALEXIS Screen w/Reflex    Anti-Liver, Kidney, Microsome Ab    Antimitochondrial Antibody    Anti-Smooth Muscle Antibody    CBC Auto Differential    Comprehensive Metabolic Panel    Protime-INR    Immunoglobulins (IgG, IgA, IgM) Quantitative       Thank you so much for allowing me to participate in the care of Luisa Barrios MD

## 2025-06-18 LAB
ANA (OHS): NORMAL
MITOCHONDRIA AB TITR SER IF: NORMAL {TITER}
SMOOTH MUSCLE AB TITR SER IF: NORMAL {TITER}

## 2025-06-19 ENCOUNTER — TELEPHONE (OUTPATIENT)
Dept: HEPATOLOGY | Facility: CLINIC | Age: 40
End: 2025-06-19
Payer: OTHER GOVERNMENT

## 2025-06-19 ENCOUNTER — PATIENT MESSAGE (OUTPATIENT)
Dept: HEPATOLOGY | Facility: CLINIC | Age: 40
End: 2025-06-19
Payer: OTHER GOVERNMENT

## 2025-06-19 ENCOUNTER — OFFICE VISIT (OUTPATIENT)
Dept: PAIN MEDICINE | Facility: CLINIC | Age: 40
End: 2025-06-19
Payer: OTHER GOVERNMENT

## 2025-06-19 VITALS
RESPIRATION RATE: 17 BRPM | WEIGHT: 200.38 LBS | SYSTOLIC BLOOD PRESSURE: 123 MMHG | BODY MASS INDEX: 33.38 KG/M2 | HEART RATE: 95 BPM | HEIGHT: 65 IN | DIASTOLIC BLOOD PRESSURE: 85 MMHG

## 2025-06-19 DIAGNOSIS — M47.812 CERVICAL SPONDYLOSIS WITHOUT MYELOPATHY: ICD-10-CM

## 2025-06-19 DIAGNOSIS — R97.8 ELEVATED TUMOR MARKERS: Primary | ICD-10-CM

## 2025-06-19 DIAGNOSIS — M79.2 NEURITIS: Primary | ICD-10-CM

## 2025-06-19 DIAGNOSIS — M54.81 BILATERAL OCCIPITAL NEURALGIA: ICD-10-CM

## 2025-06-19 PROCEDURE — 99999 PR PBB SHADOW E&M-EST. PATIENT-LVL IV: CPT | Mod: PBBFAC,,, | Performed by: STUDENT IN AN ORGANIZED HEALTH CARE EDUCATION/TRAINING PROGRAM

## 2025-06-19 PROCEDURE — 99214 OFFICE O/P EST MOD 30 MIN: CPT | Mod: PBBFAC,PN | Performed by: STUDENT IN AN ORGANIZED HEALTH CARE EDUCATION/TRAINING PROGRAM

## 2025-06-19 PROCEDURE — 99214 OFFICE O/P EST MOD 30 MIN: CPT | Mod: S$PBB,,, | Performed by: STUDENT IN AN ORGANIZED HEALTH CARE EDUCATION/TRAINING PROGRAM

## 2025-06-19 RX ORDER — CAPSAICIN 0.1 G/100G
1 CREAM TOPICAL 3 TIMES DAILY PRN
Qty: 42.5 G | Refills: 0 | Status: SHIPPED | OUTPATIENT
Start: 2025-06-19

## 2025-06-19 NOTE — TELEPHONE ENCOUNTER
AFP abnormal     Please have patient get AFP, CA 19-9, CEA now and for AFP and CA 19-9 every 4 weeks x 3.  CEA only once.

## 2025-06-20 ENCOUNTER — RESULTS FOLLOW-UP (OUTPATIENT)
Dept: TRANSPLANT | Facility: CLINIC | Age: 40
End: 2025-06-20

## 2025-06-20 ENCOUNTER — LAB VISIT (OUTPATIENT)
Dept: LAB | Facility: HOSPITAL | Age: 40
End: 2025-06-20
Payer: OTHER GOVERNMENT

## 2025-06-20 DIAGNOSIS — R97.8 ELEVATED TUMOR MARKERS: ICD-10-CM

## 2025-06-20 LAB — W LIVER-KIDNEY MICROSOME AB: 0.9 UNITS

## 2025-06-20 PROCEDURE — 36415 COLL VENOUS BLD VENIPUNCTURE: CPT | Mod: PN

## 2025-06-20 PROCEDURE — 82378 CARCINOEMBRYONIC ANTIGEN: CPT

## 2025-06-20 PROCEDURE — 86301 IMMUNOASSAY TUMOR CA 19-9: CPT

## 2025-06-21 LAB
CANCER AG19-9 SERPL-ACNC: 10 U/ML
CARCINOEMBRYONIC ANTIGEN (OHS): <1.7 NG/ML

## 2025-06-23 ENCOUNTER — OFFICE VISIT (OUTPATIENT)
Dept: OBSTETRICS AND GYNECOLOGY | Facility: CLINIC | Age: 40
End: 2025-06-23
Payer: OTHER GOVERNMENT

## 2025-06-23 ENCOUNTER — RESULTS FOLLOW-UP (OUTPATIENT)
Dept: TRANSPLANT | Facility: CLINIC | Age: 40
End: 2025-06-23

## 2025-06-23 VITALS
BODY MASS INDEX: 33.09 KG/M2 | WEIGHT: 198.88 LBS | DIASTOLIC BLOOD PRESSURE: 74 MMHG | SYSTOLIC BLOOD PRESSURE: 110 MMHG

## 2025-06-23 DIAGNOSIS — N94.6 DYSMENORRHEA: Primary | ICD-10-CM

## 2025-06-23 DIAGNOSIS — N80.30 ENDOMETRIOSIS, PELVIC PERITONEUM: ICD-10-CM

## 2025-06-23 DIAGNOSIS — N92.0 MENORRHAGIA WITH REGULAR CYCLE: ICD-10-CM

## 2025-06-23 PROCEDURE — 99213 OFFICE O/P EST LOW 20 MIN: CPT | Mod: PBBFAC | Performed by: OBSTETRICS & GYNECOLOGY

## 2025-06-23 PROCEDURE — 99214 OFFICE O/P EST MOD 30 MIN: CPT | Mod: 57,S$PBB,, | Performed by: OBSTETRICS & GYNECOLOGY

## 2025-06-23 PROCEDURE — 99999 PR PBB SHADOW E&M-EST. PATIENT-LVL III: CPT | Mod: PBBFAC,,, | Performed by: OBSTETRICS & GYNECOLOGY

## 2025-06-23 NOTE — PROGRESS NOTES
Subjective     Patient ID: Luisa Jesus is a 40 y.o. female.    Chief Complaint:  Consult      History of Present Illness  Pelvic Pain  The patient's primary symptoms include pelvic pain. This is a chronic problem. The current episode started more than 1 year ago. The problem occurs intermittently. The problem has been waxing and waning. The pain is severe. The problem affects the right side. She is not pregnant. Associated symptoms include abdominal pain, a fever, flank pain, frequency, headaches, nausea, painful intercourse, rash and urgency. Pertinent negatives include no anorexia, back pain, chills, constipation, diarrhea, discolored urine, dysuria, hematuria or vomiting. The symptoms are aggravated by activity, heavy lifting, intercourse, tactile pressure and menstrual cycle. She has tried acetaminophen, heating pad, NSAIDs, oral narcotics and warm bath for the symptoms. The treatment provided mild relief. She is sexually active. No, her partner does not have an STD. She uses nothing for contraception. Her menstrual history has been irregular. Her past medical history is significant for an abdominal surgery, endometriosis, menorrhagia, metrorrhagia and ovarian cysts. There is no history of a  section, an ectopic pregnancy, a gynecological surgery, herpes simplex, miscarriage, perineal abscess, PID, an STD, a terminated pregnancy or vaginosis.     Pt is 40 y.o. known to our clinic who has been continuing to have painful menstrual cycles.  Her pain is primarily on the right side.  She has tried different medical therapies which have been unsuccessful.  She had a recent ultrasound that was overall negative.  She is here today for more definitive therapy.    GYN & OB History  Patient's last menstrual period was 2025.   Date of Last Pap: 2025    OB History    Para Term  AB Living   3 3 3      SAB IAB Ectopic Multiple Live Births             # Outcome Date GA Lbr Jose/  Weight Sex Type Anes PTL Lv   3 Term            2 Term            1 Term                Review of Systems  Review of Systems   Constitutional:  Positive for fever. Negative for chills.   Gastrointestinal:  Positive for abdominal pain and nausea. Negative for anorexia, constipation, diarrhea and vomiting.   Genitourinary:  Positive for flank pain, frequency, menorrhagia, pelvic pain and urgency. Negative for dysuria and hematuria.   Musculoskeletal:  Negative for back pain.   Integumentary:  Positive for rash.   Neurological:  Positive for headaches.          Objective   Physical Exam:   Constitutional: She is oriented to person, place, and time. She appears well-developed and well-nourished. No distress.    HENT:   Head: Normocephalic and atraumatic.    Eyes: Pupils are equal, round, and reactive to light. Conjunctivae and lids are normal.     Cardiovascular:  Normal rate and regular rhythm.      Exam reveals no edema.        Pulmonary/Chest: Effort normal.        Abdominal: Soft. Bowel sounds are normal. She exhibits no distension. There is no abdominal tenderness. There is no rebound and no guarding.     Genitourinary:    Vagina, uterus, right adnexa and left adnexa normal.      Pelvic exam was performed with patient supine.   The external female genitalia was normal.     Labial bartholins normal.There is no tenderness or lesion on the right labia. There is no tenderness or lesion on the left labia. Cervix is normal. Right adnexum displays no mass and no tenderness. Left adnexum displays no mass and no tenderness. No vaginal discharge, rectocele, cystocele or prolapse of vaginal walls in the vagina. Cervix exhibits no motion tenderness and no discharge. Uterus is not deviated, not fixed and not hosting fibroids. Normal urethral meatus.Urethra findings: no tendernessBladder findings: no bladder tenderness   Genitourinary Comments: A female chaperone was present for the entire exam               Musculoskeletal:  "Normal range of motion and moves all extremeties.       Neurological: She is alert and oriented to person, place, and time. She has normal strength.    Skin: Skin is warm and dry.    Psychiatric: She has a normal mood and affect. Her speech is normal and behavior is normal. Thought content normal. Her mood appears not anxious. She does not exhibit a depressed mood. She expresses no suicidal plans and no homicidal plans.            Assessment and Plan     1. Dysmenorrhea    2. Menorrhagia with regular cycle    3. Endometriosis, pelvic peritoneum             Plan:  Luisa Hannah" was seen today for consult.    Diagnoses and all orders for this visit:    Dysmenorrhea  -     Case Request Operating Room: HYSTERECTOMY,VAGINAL,LAPAROSCOPY-ASSISTED,WITH SALPINGO-OOPHORECTOMY  We had a long discussion today about the medical and surgical treatment options for her abnormal bleeding and pain.  Patient is ready for something more definitive.  We talked the possibility of removal of her right ovary.  She is aware that it may not help her pain.  But if we preserve her left ovary, it would minimize any menopausal symptoms.  Patient would like to proceed.  Given that she has had 3 vaginal deliveries and an adequate exam, she would be a good vaginal hysterectomy candidate.  We discussed the risks, benefits, indications, and alternatives.  All questions were answered.  She would like to proceed  Menorrhagia with regular cycle  -     Case Request Operating Room: HYSTERECTOMY,VAGINAL,LAPAROSCOPY-ASSISTED,WITH SALPINGO-OOPHORECTOMY    Endometriosis, pelvic peritoneum                  "

## 2025-06-24 ENCOUNTER — TELEPHONE (OUTPATIENT)
Dept: HEPATOLOGY | Facility: CLINIC | Age: 40
End: 2025-06-24
Payer: OTHER GOVERNMENT

## 2025-06-24 NOTE — TELEPHONE ENCOUNTER
----- Message from Lisa Barrios MD sent at 6/23/2025  5:28 PM CDT -----  Patient results are OK.  ----- Message -----  From: Lab, Background User  Sent: 6/21/2025  12:17 AM CDT  To: Lisa Barrios MD

## 2025-06-24 NOTE — TELEPHONE ENCOUNTER
----- Message from Lisa Barrios MD sent at 6/23/2025  5:11 PM CDT -----  Please inform  patient:  All autoimmune markers are normal.   ----- Message -----  From: Lab, Background User  Sent: 6/17/2025   4:14 PM CDT  To: Lisa Barrios MD

## 2025-06-29 DIAGNOSIS — K52.9 GASTROENTERITIS: ICD-10-CM

## 2025-06-29 DIAGNOSIS — R10.13 EPIGASTRIC PAIN: ICD-10-CM

## 2025-06-29 RX ORDER — OMEPRAZOLE 40 MG/1
40 CAPSULE, DELAYED RELEASE ORAL EVERY MORNING
Qty: 90 CAPSULE | Refills: 3 | Status: SHIPPED | OUTPATIENT
Start: 2025-06-29

## 2025-06-29 RX ORDER — FAMOTIDINE 40 MG/1
40 TABLET, FILM COATED ORAL NIGHTLY PRN
Qty: 90 TABLET | Refills: 3 | Status: SHIPPED | OUTPATIENT
Start: 2025-06-29

## 2025-07-01 ENCOUNTER — HOSPITAL ENCOUNTER (OUTPATIENT)
Dept: RADIOLOGY | Facility: HOSPITAL | Age: 40
Discharge: HOME | End: 2025-07-01
Attending: INTERNAL MEDICINE
Payer: OTHER GOVERNMENT

## 2025-07-01 DIAGNOSIS — R17 JAUNDICE: ICD-10-CM

## 2025-07-01 PROCEDURE — A9585 GADOBUTROL INJECTION: HCPCS | Performed by: INTERNAL MEDICINE

## 2025-07-01 PROCEDURE — 25500020 PHARM REV CODE 255: Performed by: INTERNAL MEDICINE

## 2025-07-01 PROCEDURE — 74183 MRI ABD W/O CNTR FLWD CNTR: CPT | Mod: TC

## 2025-07-01 RX ORDER — GADOBUTROL 604.72 MG/ML
9 INJECTION INTRAVENOUS
Status: COMPLETED | OUTPATIENT
Start: 2025-07-01 | End: 2025-07-01

## 2025-07-01 RX ADMIN — GADOBUTROL 9 ML: 604.72 INJECTION INTRAVENOUS at 04:07

## 2025-07-02 ENCOUNTER — PATIENT MESSAGE (OUTPATIENT)
Dept: HEPATOLOGY | Facility: CLINIC | Age: 40
End: 2025-07-02
Payer: OTHER GOVERNMENT

## 2025-07-02 ENCOUNTER — RESULTS FOLLOW-UP (OUTPATIENT)
Dept: TRANSPLANT | Facility: CLINIC | Age: 40
End: 2025-07-02

## 2025-07-02 DIAGNOSIS — R77.2 ELEVATED AFP: Primary | ICD-10-CM

## 2025-07-03 ENCOUNTER — LAB VISIT (OUTPATIENT)
Dept: LAB | Facility: HOSPITAL | Age: 40
End: 2025-07-03
Payer: OTHER GOVERNMENT

## 2025-07-03 ENCOUNTER — TELEPHONE (OUTPATIENT)
Dept: HEPATOLOGY | Facility: CLINIC | Age: 40
End: 2025-07-03
Payer: OTHER GOVERNMENT

## 2025-07-03 DIAGNOSIS — R77.2 ELEVATED AFP: ICD-10-CM

## 2025-07-03 PROCEDURE — 36415 COLL VENOUS BLD VENIPUNCTURE: CPT | Mod: PN

## 2025-07-03 PROCEDURE — 82107 ALPHA-FETOPROTEIN L3: CPT

## 2025-07-03 PROCEDURE — 83951 ONCOPROTEIN DCP: CPT

## 2025-07-03 NOTE — TELEPHONE ENCOUNTER
----- Message from Lisa Barrios MD sent at 7/2/2025  7:04 PM CDT -----  CA 19-9 is normal, and lower than previously.  AFP is mildly elevated, but lower now than previously. I will order two other tumor markers AFP L3 and DCP to see if they are elevated. They sometimes   detect tumor when ultrasound or MRI have not shown a tumor in the liver. Also, I will request radiologists to review the MRI, MRCP, in the context of mildly elevated AFP and see if they think there   is any tumor in the liver or in/around the bile ducts.      Please schedule AFP L3 and DCP.   ----- Message -----  From: Lab, Background User  Sent: 7/2/2025  12:12 AM CDT  To: Lisa Barrios MD

## 2025-07-10 ENCOUNTER — PATIENT MESSAGE (OUTPATIENT)
Dept: GASTROENTEROLOGY | Facility: CLINIC | Age: 40
End: 2025-07-10
Payer: OTHER GOVERNMENT

## 2025-07-10 LAB
ACARBOXYPROTHROMBIN SERPL-MCNC: 0.2 NG/ML
AFP L3 MFR SERPL: <0.5 %
AFP SERPL-MCNC: 6 NG/ML

## 2025-07-11 ENCOUNTER — TELEPHONE (OUTPATIENT)
Dept: HEPATOLOGY | Facility: CLINIC | Age: 40
End: 2025-07-11
Payer: OTHER GOVERNMENT

## 2025-07-11 NOTE — TELEPHONE ENCOUNTER
Patient: Luisa Jesus       MRN: [id number]      : 1985     Age: 40 y.o.  [address]  [place] [address]    Presenting Radiologists:     Providers: Lisa Barrios MD    Priority of review: Positive tumor marker    Patient Transplant Status: Not a candidate    Reason for presentation: Other elevated AFP without visible liver lesion    Clinical Summary: 41 y/o female referred to hepatology for RUQ pain,  itching, leg selling, abdominal swelling. She was given Embrel for RA, apparently caused gastroparesis.      CT abd w IV contrast 1/10/25:  no focal hepatic abnormality.  Gallbladder is surgically absent.  There is minimal intra and extrahepatic biliary ductal dilatation most likely relating to post cholecystectomy status. 4 cm Right adnexal cyst.     CT abd Repeated 3/27/25: same findings, no dilated ducts, no pancreatic mass.     MRI MRCP 25 to see what the bile ducts look like, because she had RUQ pain and itching.    The gallbladder is surgically absent.  The caliber of the intra and extrahepatic biliary tree is at the upper limits of normal, with the common bile duct measuring 8 mm in the jennifer hepatis.  There is normal tapering of the distal duct with no evidence of choledocholithiasis.  The pancreatic duct is not dilated.   No significant biliary ductal dilatation is noted. Previous cholecystectomy.      AFP 13.3, repeat 12.7, AFP L3 7 not high enough to be significant for HCC  CA 19-9 was 6, 10.  CEA normal       Imaging to be reviewed: MRI MRCP 25, CT abd 1/10/25.     HCC Treatment History: none, but AFP positive    Platelets:   Lab Results   Component Value Date/Time     2025 03:22 PM     01/15/2025 03:11 PM     Creatinine:   Lab Results   Component Value Date/Time    CREATININE 0.7 2025 03:22 PM     Bilirubin:   Lab Results   Component Value Date/Time    BILITOT 0.2 2025 03:22 PM    BILITOT 0.3 01/15/2025 03:11 PM     AFP Last 3 each if available:    Lab Results   Component Value Date/Time    AFP 12.7 (H) 07/01/2025 12:35 PM    AFP 13.3 (H) 06/17/2025 03:22 PM       MELD: MELD 3.0: 7 at 6/17/2025  3:22 PM  MELD-Na: 6 at 6/17/2025  3:22 PM  Calculated from:  Serum Creatinine: 0.7 mg/dL (Using min of 1 mg/dL) at 6/17/2025  3:22 PM  Serum Sodium: 140 mmol/L (Using max of 137 mmol/L) at 6/17/2025  3:22 PM  Total Bilirubin: 0.2 mg/dL (Using min of 1 mg/dL) at 6/17/2025  3:22 PM  Serum Albumin: 4.2 g/dL (Using max of 3.5 g/dL) at 6/17/2025  3:22 PM  INR(ratio): 1 at 6/17/2025  3:22 PM  Age at listing (hypothetical): 40 years  Sex: Female at 6/17/2025  3:22 PM      Plan:     Follow-up Provider:

## 2025-07-14 ENCOUNTER — TELEPHONE (OUTPATIENT)
Dept: HEPATOLOGY | Facility: CLINIC | Age: 40
End: 2025-07-14
Payer: OTHER GOVERNMENT

## 2025-07-14 ENCOUNTER — CONFERENCE (OUTPATIENT)
Dept: TRANSPLANT | Facility: CLINIC | Age: 40
End: 2025-07-14
Payer: OTHER GOVERNMENT

## 2025-07-14 NOTE — TELEPHONE ENCOUNTER
Patient: Luisa Jesus       MRN: [id number]      : 1985     Age: 40 y.o.  [address]  [place] [address]    Presenting Radiologists:     Providers: Lisa Barrios MD    Priority of review: Positive tumor marker    Patient Transplant Status: Not a candidate    Reason for presentation: Other elevated AFP without visible liver lesion    Clinical Summary: 41 y/o female referred to hepatology for RUQ pain,  itching, leg selling, abdominal swelling. She was given Embrel for RA, apparently caused gastroparesis.      CT abd w IV contrast 1/10/25:  no focal hepatic abnormality.  Gallbladder is surgically absent.  There is minimal intra and extrahepatic biliary ductal dilatation most likely relating to post cholecystectomy status. 4 cm Right adnexal cyst.     CT abd Repeated 3/27/25: same findings, no dilated ducts, no pancreatic mass.     MRI MRCP 25 to see what the bile ducts look like, because she had RUQ pain and itching.    The gallbladder is surgically absent.  The caliber of the intra and extrahepatic biliary tree is at the upper limits of normal, with the common bile duct measuring 8 mm in the jennifer hepatis.  There is normal tapering of the distal duct with no evidence of choledocholithiasis.  The pancreatic duct is not dilated.   No significant biliary ductal dilatation is noted. Previous cholecystectomy.      AFP 13.3, repeat 12.7, AFP L3 7 not high enough to be significant for HCC  CA 19-9 was 6, 10.  CEA normal       Imaging to be reviewed: MRI MRCP 25, CT abd 1/10/25.     HCC Treatment History: none, but AFP positive    Platelets:   Lab Results   Component Value Date/Time     2025 03:22 PM     01/15/2025 03:11 PM     Creatinine:   Lab Results   Component Value Date/Time    CREATININE 0.7 2025 03:22 PM     Bilirubin:   Lab Results   Component Value Date/Time    BILITOT 0.2 2025 03:22 PM    BILITOT 0.3 01/15/2025 03:11 PM     AFP Last 3 each if available:    Lab Results   Component Value Date/Time    AFP 12.7 (H) 07/01/2025 12:35 PM    AFP 13.3 (H) 06/17/2025 03:22 PM       MELD: MELD 3.0: 7 at 6/17/2025  3:22 PM  MELD-Na: 6 at 6/17/2025  3:22 PM  Calculated from:  Serum Creatinine: 0.7 mg/dL (Using min of 1 mg/dL) at 6/17/2025  3:22 PM  Serum Sodium: 140 mmol/L (Using max of 137 mmol/L) at 6/17/2025  3:22 PM  Total Bilirubin: 0.2 mg/dL (Using min of 1 mg/dL) at 6/17/2025  3:22 PM  Serum Albumin: 4.2 g/dL (Using max of 3.5 g/dL) at 6/17/2025  3:22 PM  INR(ratio): 1 at 6/17/2025  3:22 PM  Age at listing (hypothetical): 40 years  Sex: Female at 6/17/2025  3:22 PM    Discussion/Plan: no lesions. Normal biliary.       Committee Discussion/Plan:  No concerning lesion. Biliary system looks fine    Follow-up Provider: Dr Barrios

## 2025-07-14 NOTE — TELEPHONE ENCOUNTER
Spoke with the pt and pt was informed regarding private message from the provider below:      Please tell Ms. Jesus that both tumor markers (AFP L3, DCP) were low, not high enough to support liver cancer diagnosis. But on the safe side, I will present her case to IR conference where the committee will look at her imaging (MRI) and tumor markers and will be evaluated.- Dr. GUILLAUME Gonzalez      Pt verbalized understanding and thankful.

## 2025-07-21 ENCOUNTER — TELEPHONE (OUTPATIENT)
Dept: HEPATOLOGY | Facility: CLINIC | Age: 40
End: 2025-07-21
Payer: OTHER GOVERNMENT

## 2025-07-21 DIAGNOSIS — R77.2 ELEVATED AFP: Primary | ICD-10-CM

## 2025-07-21 NOTE — TELEPHONE ENCOUNTER
Post-IR conference call to patient:    I called patient with result of IR conf review of imaging, and consideration of AFP mild elevation 13.3 and 12.7 two weeks apart in June and July of 2025.  Her Imaging was read as showing no lesion, and bile ducts were fine.  I gave the patient this report.  Plus the findings of AFP L3 and DCP, neither was high enough to specifically indicate hers is due to HCC.  CA 19-9 and CEA were also normal.      So, I have recommended getting AFP monthly (first of each month, start Aug 2025) x 6 months.  If AFP increases, we can get AFP L3 and DCP again in the 6 month period.  In addition, I will get an MRI abd w wo contrast in 6 months after her last imaging, around 12/1/2025.      Patient agrees to do the above. Orders placed for AFP and  Abd MRI w wo contrast as above.       Lisa Barrios MD

## 2025-07-22 ENCOUNTER — PATIENT MESSAGE (OUTPATIENT)
Dept: OBSTETRICS AND GYNECOLOGY | Facility: CLINIC | Age: 40
End: 2025-07-22
Payer: OTHER GOVERNMENT

## 2025-07-28 ENCOUNTER — OFFICE VISIT (OUTPATIENT)
Dept: OBSTETRICS AND GYNECOLOGY | Facility: CLINIC | Age: 40
End: 2025-07-28
Payer: OTHER GOVERNMENT

## 2025-07-28 ENCOUNTER — HOSPITAL ENCOUNTER (OUTPATIENT)
Dept: PREADMISSION TESTING | Facility: OTHER | Age: 40
Discharge: HOME | End: 2025-07-28
Attending: OBSTETRICS & GYNECOLOGY
Payer: OTHER GOVERNMENT

## 2025-07-28 VITALS
SYSTOLIC BLOOD PRESSURE: 116 MMHG | WEIGHT: 197.06 LBS | BODY MASS INDEX: 32.83 KG/M2 | DIASTOLIC BLOOD PRESSURE: 82 MMHG | HEIGHT: 65 IN

## 2025-07-28 DIAGNOSIS — R10.31 CHRONIC RLQ PAIN: ICD-10-CM

## 2025-07-28 DIAGNOSIS — N94.6 DYSMENORRHEA: Primary | ICD-10-CM

## 2025-07-28 DIAGNOSIS — G90.A POTS (POSTURAL ORTHOSTATIC TACHYCARDIA SYNDROME): ICD-10-CM

## 2025-07-28 DIAGNOSIS — G89.29 CHRONIC RLQ PAIN: ICD-10-CM

## 2025-07-28 DIAGNOSIS — Z01.818 PREOP TESTING: Primary | ICD-10-CM

## 2025-07-28 LAB
ABSOLUTE EOSINOPHIL (OHS): 0.06 K/UL
ABSOLUTE MONOCYTE (OHS): 0.33 K/UL (ref 0.3–1)
ABSOLUTE NEUTROPHIL COUNT (OHS): 3.3 K/UL (ref 1.8–7.7)
BASOPHILS # BLD AUTO: 0.03 K/UL
BASOPHILS NFR BLD AUTO: 0.6 %
ERYTHROCYTE [DISTWIDTH] IN BLOOD BY AUTOMATED COUNT: 15.8 % (ref 11.5–14.5)
HCT VFR BLD AUTO: 36 % (ref 37–48.5)
HGB BLD-MCNC: 11.3 GM/DL (ref 12–16)
IMM GRANULOCYTES # BLD AUTO: 0.02 K/UL (ref 0–0.04)
IMM GRANULOCYTES NFR BLD AUTO: 0.4 % (ref 0–0.5)
INDIRECT COOMBS: NORMAL
LYMPHOCYTES # BLD AUTO: 1.65 K/UL (ref 1–4.8)
MCH RBC QN AUTO: 25.5 PG (ref 27–31)
MCHC RBC AUTO-ENTMCNC: 31.4 G/DL (ref 32–36)
MCV RBC AUTO: 81 FL (ref 82–98)
NUCLEATED RBC (/100WBC) (OHS): 0 /100 WBC
PLATELET # BLD AUTO: 355 K/UL (ref 150–450)
PMV BLD AUTO: 10.7 FL (ref 9.2–12.9)
RBC # BLD AUTO: 4.43 M/UL (ref 4–5.4)
RELATIVE EOSINOPHIL (OHS): 1.1 %
RELATIVE LYMPHOCYTE (OHS): 30.6 % (ref 18–48)
RELATIVE MONOCYTE (OHS): 6.1 % (ref 4–15)
RELATIVE NEUTROPHIL (OHS): 61.2 % (ref 38–73)
RH BLD: NORMAL
SPECIMEN OUTDATE: NORMAL
WBC # BLD AUTO: 5.39 K/UL (ref 3.9–12.7)

## 2025-07-28 PROCEDURE — 99499 UNLISTED E&M SERVICE: CPT | Mod: S$PBB,,, | Performed by: OBSTETRICS & GYNECOLOGY

## 2025-07-28 PROCEDURE — 86850 RBC ANTIBODY SCREEN: CPT

## 2025-07-28 PROCEDURE — 99213 OFFICE O/P EST LOW 20 MIN: CPT | Mod: PBBFAC | Performed by: OBSTETRICS & GYNECOLOGY

## 2025-07-28 PROCEDURE — 99999 PR PBB SHADOW E&M-EST. PATIENT-LVL III: CPT | Mod: PBBFAC,,, | Performed by: OBSTETRICS & GYNECOLOGY

## 2025-07-28 PROCEDURE — 85025 COMPLETE CBC W/AUTO DIFF WBC: CPT

## 2025-07-28 RX ORDER — SODIUM CHLORIDE 9 MG/ML
INJECTION, SOLUTION INTRAVENOUS CONTINUOUS
OUTPATIENT
Start: 2025-07-28

## 2025-07-28 RX ORDER — CEFAZOLIN SODIUM 2 G/50ML
2 SOLUTION INTRAVENOUS
OUTPATIENT
Start: 2025-07-28

## 2025-07-28 RX ORDER — MUPIROCIN 20 MG/G
OINTMENT TOPICAL
OUTPATIENT
Start: 2025-07-28

## 2025-07-28 RX ORDER — FAMOTIDINE 20 MG/1
20 TABLET, FILM COATED ORAL
OUTPATIENT
Start: 2025-07-28

## 2025-07-28 RX ORDER — ACETAMINOPHEN 500 MG
1000 TABLET ORAL
OUTPATIENT
Start: 2025-07-28 | End: 2025-07-28

## 2025-07-28 RX ORDER — SODIUM CHLORIDE, SODIUM LACTATE, POTASSIUM CHLORIDE, CALCIUM CHLORIDE 600; 310; 30; 20 MG/100ML; MG/100ML; MG/100ML; MG/100ML
INJECTION, SOLUTION INTRAVENOUS CONTINUOUS
OUTPATIENT
Start: 2025-07-28

## 2025-07-28 NOTE — DISCHARGE INSTRUCTIONS
Information to Prepare you for your Surgery    PRE-ADMIT TESTING   [address]  [place] [facility]  [address]     Your surgery has been scheduled at [facility]. We are pleased to have the opportunity to serve you. For Further Information please call [phone].    On the day of surgery please report to Registration on the 1st floor of the [facility].    CONTACT YOUR PHYSICIAN'S OFFICE THE DAY PRIOR TO YOUR SURGERY TO OBTAIN YOUR ARRIVAL TIME.     The evening before surgery do not eat anything after 9 p.m. ( this includes hard candy, chewing gum and mints).  You may only have GATORADE, POWERADE AND WATER  from 9 p.m. until you leave your home.   DO NOT DRINK ANY LIQUIDS ON THE WAY TO THE HOSPITAL.      Why does your anesthesiologist allow you to drink Gatorade/Powerade before surgery?  Gatorade/Powerade helps to increase your comfort before surgery and to decrease your nausea after surgery.   The carbohydrates in Gatorade/Powerade help reduce your body's stress response to surgery.  If you are a diabetic-drink only water prior to surgery.    Outpatient Surgery- May allow 2 adults (18 and older)/ Support Persons (1 being the designated ) for all surgical/procedural patients. A breastfeeding mother will be allowed her infant and 2 adult Support Persons. No one under the age of 18 will be allowed in the building.    MEDICATION INSTRUCTIONS: TAKE medications checked off by the Anesthesiologist on your Medication List.    Surgery Patients:  If you take ASPIRIN - Your PHYSICIAN/SURGEON will need to inform you IF/OR when you need to stop taking aspirin prior to your surgery.     Starting the week prior to surgery, do not take any medications containing IBUPROFEN or NSAIDS (Advil, Aleve, BC, Celebrex, Goody's, Ketorolac, Meloxicam, Mobic, Motrin, Naproxen, Toradol, etc).  If you are not sure if you should take a medicine please call your surgeon's office.  You may take Tylenol.    Do  Not Wear any make-up (especially eye make-up) to surgery. Please remove any false eyelashes or eyelash extensions. If you arrive the day of surgery with makeup/eyelashes on you will be required to remove prior to surgery. (There is a risk of corneal abrasions if eye makeup/eyelash extensions are not removed)    Leave all valuables at home.   Do Not wear any jewelry or watches, including any metal in body piercings. Jewelry must be removed prior to coming to the hospital.  There is a possibility that rings that are unable to be removed may be cut off if they are on the surgical extremity.    Please remove all hair extensions, wigs, clips and any other metal accessories/ ornaments from your hair.  These items may pose a flammable/fire risk in Surgery and must be removed.    Do not shave your surgical area at least 5 days prior to your surgery. The surgical prep will be performed at the hospital according to Infection Control regulations.    Contact Lens must be removed before surgery. Either do not wear the contact lens or bring a case and solution for storage.  Please bring a container for eyeglasses or dentures as required.  Bring any paperwork your physician has provided, such as consent forms,  history and physicals, doctor's orders, etc.   Bring comfortable clothes that are loose fitting to wear upon discharge. Take into consideration the type of surgery being performed.  Maintain your diet as advised per your physician the day prior to surgery.    Adequate rest the night before surgery is advised.   Park in the Parking lot behind the hospital or in the [place] across the street from the parking lot. Parking is complimentary.  If you will be discharged the same day as your procedure, please arrange for a responsible adult to drive you home or to accompany you if traveling by taxi.   YOU WILL NOT BE PERMITTED TO DRIVE OR TO LEAVE THE HOSPITAL ALONE AFTER SURGERY.   If you are being discharged the  same day, it is strongly recommended that you arrange for someone to remain with you for the first 24 hrs following your surgery.    The Surgeon will speak to your family/visitor after your surgery regarding the outcome of your surgery and post op care.  The Surgeon may speak to you after your surgery, but there is a possibility you may not remember the details.  Please check with your family members regarding the conversation with the Surgeon.    We strongly recommend whoever is bringing you home be present for discharge instructions.  This will ensure a thorough understanding for your post op home care.              Bathing Instructions with Hibiclens  Shower the evening before and morning of your procedure with Chlorhexidine (Hibiclens)    Do not use Chlorhexidine on your face or genitals. Do not get in your eyes.  Wash your face with water and your regular face wash/soap  Use your regular shampoo  Apply Chlorhexidine (Hibiclens) directly on your skin or on a wet washcloth and wash gently. When showering: Move away from the shower stream when applying Chlorhexidine (Hibiclens) to avoid rinsing off too soon.  Rinse thoroughly with warm water  Do not dilute Chlorhexidine (Hibiclens)   Dry off as usual, do not use any deodorant, powder, body lotions, perfume, after shave or cologne.     If the patient has fever, cough, or signs/symptoms of Flu or Covid please do not come in for your surgery.   Contact your surgeon and your primary care physician for further instructions.   Please also call [facility] [phone]. The unit opens at 5 AM.    If applicable, please bring your blood pressure & diabetes medications the day of surgery.

## 2025-07-28 NOTE — PROGRESS NOTES
Subjective     Patient ID: Luisa Jesus is a 40 y.o. female.    Chief Complaint:  Pre-op Exam      History of Present Illness  HPI  Pt is 40 y.o. here in preop for VNOTES vaginal hysterectomy, RSO, left salpingectomy due to menorrhagia, RLQ pain.  Last u/s as follows:    FINDINGS:  Uterus:     Size: 8.7 x 4.3 x 4.5 cm     The parenchyma is homogeneous.  Endometrium is normal caliber measuring 1.5 cm.     Right ovary:     Size: 3.6 x 2.3 x 2.7 cm.  No suspicious adnexal mass.     Left ovary:     Size: 2.8 x 2.1 x 1.8 cm. No suspicious adnexal mass.     Free Fluid:     None.     Impression:     Normal exam for age.  GYN & OB History  Patient's last menstrual period was 2025.   Date of Last Pap: 2025    OB History    Para Term  AB Living   3 3 3      SAB IAB Ectopic Multiple Live Births             # Outcome Date GA Lbr Jose/2nd Weight Sex Type Anes PTL Lv   3 Term            2 Term            1 Term                Review of Systems  Review of Systems   Constitutional:  Negative for activity change, appetite change and fatigue.   HENT: Negative.  Negative for tinnitus.    Eyes: Negative.    Respiratory:  Negative for cough and shortness of breath.    Cardiovascular:  Negative for chest pain and palpitations.   Gastrointestinal: Negative.  Negative for abdominal pain, blood in stool, constipation, diarrhea and nausea.   Endocrine: Negative.  Negative for hot flashes.   Genitourinary:  Negative for dysmenorrhea, dyspareunia, dysuria, frequency, menstrual problem, pelvic pain, vaginal discharge, urinary incontinence and postcoital bleeding.   Musculoskeletal:  Negative for back pain and joint swelling.   Integumentary:  Negative for rash, breast mass, nipple discharge and breast skin changes.   Neurological: Negative.  Negative for headaches.   Hematological: Negative.  Does not bruise/bleed easily.   Psychiatric/Behavioral:  The patient is not nervous/anxious.    Breast: negative.  Negative for  "mass, nipple discharge and skin changes         Objective   Physical Exam:   Constitutional: She is oriented to person, place, and time. She appears well-developed and well-nourished. No distress.    HENT:   Head: Normocephalic and atraumatic.    Eyes: Pupils are equal, round, and reactive to light. Conjunctivae and lids are normal.     Cardiovascular:  Normal rate.      Exam reveals no edema.        Pulmonary/Chest: Effort normal.        Abdominal: Soft. Bowel sounds are normal. She exhibits no distension. There is no abdominal tenderness. There is no rebound and no guarding.             Musculoskeletal: Normal range of motion and moves all extremeties.       Neurological: She is alert and oriented to person, place, and time. She has normal strength.    Skin: Skin is warm and dry.    Psychiatric: She has a normal mood and affect. Her speech is normal and behavior is normal. Thought content normal. Her mood appears not anxious. She does not exhibit a depressed mood. She expresses no suicidal plans and no homicidal plans.            Assessment and Plan     1. Dysmenorrhea    2. Chronic RLQ pain    3. POTS (postural orthostatic tachycardia syndrome)             Plan:  Luisa Hannah" was seen today for pre-op exam.    Diagnoses and all orders for this visit:    Dysmenorrhea  -     Vital signs; Standing  -     Insert peripheral IV; Standing  -     POCT glucose; Standing  -     Notify physician if BS > 180 for hysterectomy patients; Standing  -     Chlorhexidine (CHG) 2% Wipes; Standing  -     Notify Physician/Vital Signs Parameters Urine output less than 0.5mL/kg/hr (with indwelling catheter) or 30 mL/hr (without indwelling catheter) or blood glucose greater than 200 mg/dL; Standing  -     Notify physician; Standing  -     Notify Physician - Potential Need of Opioid Reversal; Standing  -     Diet NPO; Standing  -     Chlorohexidine Gluconate Bath; Standing  -     Full code; Standing  -     Place in Outpatient; " Standing  -     Bed rest with bathroom privileges; Standing  -     Place sequential compression device; Standing  -     Pregnancy, urine rapid; Standing    Chronic RLQ pain  -     Vital signs; Standing  -     Insert peripheral IV; Standing  -     POCT glucose; Standing  -     Notify physician if BS > 180 for hysterectomy patients; Standing  -     Chlorhexidine (CHG) 2% Wipes; Standing  -     Notify Physician/Vital Signs Parameters Urine output less than 0.5mL/kg/hr (with indwelling catheter) or 30 mL/hr (without indwelling catheter) or blood glucose greater than 200 mg/dL; Standing  -     Notify physician; Standing  -     Notify Physician - Potential Need of Opioid Reversal; Standing  -     Diet NPO; Standing  -     Chlorohexidine Gluconate Bath; Standing  -     Full code; Standing  -     Place in Outpatient; Standing  -     Bed rest with bathroom privileges; Standing  -     Place sequential compression device; Standing  -     Pregnancy, urine rapid; Standing    POTS (postural orthostatic tachycardia syndrome)  Has been overall stable.    Other orders  -     0.9% NaCl infusion  -     IP VTE LOW RISK PATIENT; Standing  -     mupirocin 2 % ointment  -     famotidine tablet 20 mg  -     cefazolin (ANCEF) 2 gram in dextrose 5% 50 mL IVPB (premix)    I have an an extensive discussion with pt about the risks, benefits, indications, and alternatives of the procedure in detail.    Pt is aware of the hysterectomy specific risk including:    - Severe bleeding from large blood vessels around the uterus or top of vagina. This is not common. Emergency surgery may be required to repair the damaged blood vessels, or a blood transfusion may be required to replace blood loss. A vaginal pack may also be used to control the bleeding.  - Infection in the operation site, pelvis or urinary tract. Treatment may include antibiotics.  - Nearby organs such as the ureter (tube leading from kidney to bladder), bladder or bowel may be  injured--expected to happen to approximately one in every  140 women. Further surgery will be needed to repair the injuries. For bladder injuries, a catheter may be put into the bladder to drain the urine away until the bladder is healed. For ureter injury, a plastic tube (stent) is placed in the ureter for some weeks.If the bowel is injured, part of the bowel may be removed with a possibility of a temporary or permanent colostomy (bag on the abdomen to collect faeces).  - The bowel may not work after the operation; this is usually temporary. Treatment may include a drip to give fluids into the vein and no food or fluids by mouth.  - Rarely, a connection (fistula) may develop between the bladder and the vagina. This causes uncontrollable leakage of urine into the vagina and requires further corrective surgery.  - A change in the sensory nerves of the bladder and bowel. Constipation and bladder problems may occur.    All questions were answered.  Consents were signed.

## 2025-07-30 DIAGNOSIS — R77.2 ELEVATED AFP: Primary | ICD-10-CM

## 2025-07-30 DIAGNOSIS — R10.9 ABDOMINAL PAIN, UNSPECIFIED ABDOMINAL LOCATION: ICD-10-CM

## 2025-08-04 ENCOUNTER — PATIENT MESSAGE (OUTPATIENT)
Dept: OBSTETRICS AND GYNECOLOGY | Facility: CLINIC | Age: 40
End: 2025-08-04
Payer: OTHER GOVERNMENT

## 2025-08-11 ENCOUNTER — OFFICE VISIT (OUTPATIENT)
Dept: ENDOCRINOLOGY | Facility: CLINIC | Age: 40
End: 2025-08-11
Payer: OTHER GOVERNMENT

## 2025-08-11 VITALS
BODY MASS INDEX: 32.52 KG/M2 | SYSTOLIC BLOOD PRESSURE: 131 MMHG | WEIGHT: 195.38 LBS | DIASTOLIC BLOOD PRESSURE: 81 MMHG | HEART RATE: 81 BPM

## 2025-08-11 DIAGNOSIS — M32.9 SLE (SYSTEMIC LUPUS ERYTHEMATOSUS RELATED SYNDROME): ICD-10-CM

## 2025-08-11 DIAGNOSIS — E27.49 DECREASED CORTISOL LEVEL: Primary | ICD-10-CM

## 2025-08-11 DIAGNOSIS — G90.A POTS (POSTURAL ORTHOSTATIC TACHYCARDIA SYNDROME): ICD-10-CM

## 2025-08-11 DIAGNOSIS — R53.83 FATIGUE, UNSPECIFIED TYPE: ICD-10-CM

## 2025-08-11 DIAGNOSIS — M06.9 RHEUMATOID ARTHRITIS, INVOLVING UNSPECIFIED SITE, UNSPECIFIED WHETHER RHEUMATOID FACTOR PRESENT: ICD-10-CM

## 2025-08-11 DIAGNOSIS — E66.811 CLASS 1 OBESITY WITH SERIOUS COMORBIDITY AND BODY MASS INDEX (BMI) OF 32.0 TO 32.9 IN ADULT, UNSPECIFIED OBESITY TYPE: ICD-10-CM

## 2025-08-11 PROCEDURE — 99214 OFFICE O/P EST MOD 30 MIN: CPT | Mod: PBBFAC | Performed by: HOSPITALIST

## 2025-08-11 PROCEDURE — 99204 OFFICE O/P NEW MOD 45 MIN: CPT | Mod: S$PBB,,, | Performed by: HOSPITALIST

## 2025-08-11 PROCEDURE — 99999 PR PBB SHADOW E&M-EST. PATIENT-LVL IV: CPT | Mod: PBBFAC,,, | Performed by: HOSPITALIST

## 2025-08-13 ENCOUNTER — LAB VISIT (OUTPATIENT)
Dept: LAB | Facility: HOSPITAL | Age: 40
End: 2025-08-13
Attending: HOSPITALIST
Payer: OTHER GOVERNMENT

## 2025-08-13 DIAGNOSIS — E27.49 DECREASED CORTISOL LEVEL: ICD-10-CM

## 2025-08-13 DIAGNOSIS — R97.8 ELEVATED TUMOR MARKERS: ICD-10-CM

## 2025-08-13 DIAGNOSIS — R10.9 ABDOMINAL PAIN, UNSPECIFIED ABDOMINAL LOCATION: ICD-10-CM

## 2025-08-13 DIAGNOSIS — R53.83 FATIGUE, UNSPECIFIED TYPE: ICD-10-CM

## 2025-08-13 LAB
ABSOLUTE EOSINOPHIL (OHS): 0.1 K/UL
ABSOLUTE MONOCYTE (OHS): 0.29 K/UL (ref 0.3–1)
ABSOLUTE NEUTROPHIL COUNT (OHS): 3.28 K/UL (ref 1.8–7.7)
AFP SERPL-MCNC: 12.4 NG/ML
ALBUMIN SERPL BCP-MCNC: 4.3 G/DL (ref 3.5–5.2)
ALP SERPL-CCNC: 81 UNIT/L (ref 40–150)
ALT SERPL W/O P-5'-P-CCNC: 9 UNIT/L (ref 10–44)
ANION GAP (OHS): 8 MMOL/L (ref 8–16)
AST SERPL-CCNC: 20 UNIT/L (ref 11–45)
BASOPHILS # BLD AUTO: 0.01 K/UL
BASOPHILS NFR BLD AUTO: 0.2 %
BILIRUB SERPL-MCNC: 0.4 MG/DL (ref 0.1–1)
BUN SERPL-MCNC: 13 MG/DL (ref 6–20)
CALCIUM SERPL-MCNC: 8.8 MG/DL (ref 8.7–10.5)
CANCER AG19-9 SERPL-ACNC: 8 U/ML
CHLORIDE SERPL-SCNC: 111 MMOL/L (ref 95–110)
CO2 SERPL-SCNC: 24 MMOL/L (ref 23–29)
CORTIS SERPL-MCNC: 8.9 UG/DL (ref 4.3–22.4)
CREAT 24H UR-MRATE: 51.9 MG/HR (ref 40–75)
CREAT SERPL-MCNC: 0.6 MG/DL (ref 0.5–1.4)
CREAT UR-MCNC: 128.4 MG/DL (ref 15–325)
DHEA-S SERPL-MCNC: 109.8 UG/DL (ref 74.8–410.2)
ERYTHROCYTE [DISTWIDTH] IN BLOOD BY AUTOMATED COUNT: 19 % (ref 11.5–14.5)
ESTRADIOL SERPL HS-MCNC: 63 PG/ML
FSH SERPL-ACNC: 3.87 MIU/ML
GFR SERPLBLD CREATININE-BSD FMLA CKD-EPI: >60 ML/MIN/1.73/M2
GLUCOSE SERPL-MCNC: 84 MG/DL (ref 70–110)
HCT VFR BLD AUTO: 37.4 % (ref 37–48.5)
HGB BLD-MCNC: 11.8 GM/DL (ref 12–16)
IMM GRANULOCYTES # BLD AUTO: 0.01 K/UL (ref 0–0.04)
IMM GRANULOCYTES NFR BLD AUTO: 0.2 % (ref 0–0.5)
INR PPP: 1 (ref 0.8–1.2)
LH SERPL-ACNC: 4.2 MIU/ML
LYMPHOCYTES # BLD AUTO: 1.38 K/UL (ref 1–4.8)
MCH RBC QN AUTO: 26 PG (ref 27–31)
MCHC RBC AUTO-ENTMCNC: 31.6 G/DL (ref 32–36)
MCV RBC AUTO: 82 FL (ref 82–98)
NUCLEATED RBC (/100WBC) (OHS): 0 /100 WBC
PLATELET # BLD AUTO: 253 K/UL (ref 150–450)
PMV BLD AUTO: 10.3 FL (ref 9.2–12.9)
POTASSIUM SERPL-SCNC: 3.9 MMOL/L (ref 3.5–5.1)
PROLACTIN SERPL IA-MCNC: 9.9 NG/ML (ref 5.2–26.5)
PROT SERPL-MCNC: 7.1 GM/DL (ref 6–8.4)
PROTHROMBIN TIME: 10.9 SECONDS (ref 9–12.5)
RBC # BLD AUTO: 4.54 M/UL (ref 4–5.4)
RELATIVE EOSINOPHIL (OHS): 2 %
RELATIVE LYMPHOCYTE (OHS): 27.2 % (ref 18–48)
RELATIVE MONOCYTE (OHS): 5.7 % (ref 4–15)
RELATIVE NEUTROPHIL (OHS): 64.7 % (ref 38–73)
SODIUM SERPL-SCNC: 143 MMOL/L (ref 136–145)
T4 FREE SERPL-MCNC: 1.03 NG/DL (ref 0.71–1.51)
T4 FREE SERPL-MCNC: 1.03 NG/DL (ref 0.71–1.51)
TOTAL HOURS OF COLLECTION (OHS): 24 HR
TOTAL VOLUME  (OHS): 970 ML
TSH SERPL-ACNC: 0.98 UIU/ML (ref 0.4–4)
URINE CREATININE ABSOLUTE (OHS): 1245.5 MG/SPEC
WBC # BLD AUTO: 5.07 K/UL (ref 3.9–12.7)

## 2025-08-13 PROCEDURE — 84146 ASSAY OF PROLACTIN: CPT

## 2025-08-13 PROCEDURE — 80053 COMPREHEN METABOLIC PANEL: CPT

## 2025-08-13 PROCEDURE — 85025 COMPLETE CBC W/AUTO DIFF WBC: CPT

## 2025-08-13 PROCEDURE — 84443 ASSAY THYROID STIM HORMONE: CPT

## 2025-08-13 PROCEDURE — 82105 ALPHA-FETOPROTEIN SERUM: CPT

## 2025-08-13 PROCEDURE — 82024 ASSAY OF ACTH: CPT

## 2025-08-13 PROCEDURE — 86301 IMMUNOASSAY TUMOR CA 19-9: CPT

## 2025-08-13 PROCEDURE — 85610 PROTHROMBIN TIME: CPT

## 2025-08-13 PROCEDURE — 82533 TOTAL CORTISOL: CPT

## 2025-08-13 PROCEDURE — 82670 ASSAY OF TOTAL ESTRADIOL: CPT

## 2025-08-13 PROCEDURE — 83001 ASSAY OF GONADOTROPIN (FSH): CPT

## 2025-08-13 PROCEDURE — 83516 IMMUNOASSAY NONANTIBODY: CPT

## 2025-08-13 PROCEDURE — 83003 ASSAY GROWTH HORMONE (HGH): CPT

## 2025-08-13 PROCEDURE — 83002 ASSAY OF GONADOTROPIN (LH): CPT

## 2025-08-13 PROCEDURE — 82570 ASSAY OF URINE CREATININE: CPT

## 2025-08-13 PROCEDURE — 84244 ASSAY OF RENIN: CPT

## 2025-08-13 PROCEDURE — 84305 ASSAY OF SOMATOMEDIN: CPT

## 2025-08-13 PROCEDURE — 82088 ASSAY OF ALDOSTERONE: CPT

## 2025-08-13 PROCEDURE — 36415 COLL VENOUS BLD VENIPUNCTURE: CPT

## 2025-08-13 PROCEDURE — 82530 CORTISOL FREE: CPT

## 2025-08-13 PROCEDURE — 83835 ASSAY OF METANEPHRINES: CPT

## 2025-08-13 PROCEDURE — 82627 DEHYDROEPIANDROSTERONE: CPT

## 2025-08-14 ENCOUNTER — TELEPHONE (OUTPATIENT)
Dept: ENDOCRINOLOGY | Facility: CLINIC | Age: 40
End: 2025-08-14
Payer: OTHER GOVERNMENT

## 2025-08-14 PROCEDURE — 84305 ASSAY OF SOMATOMEDIN: CPT | Performed by: HOSPITALIST

## 2025-08-15 LAB
ACTH (OHS): 14 PG/ML
GH SERPL-MCNC: 0.2 NG/ML
W ALDOSTERONE: 7.1 NG/DL

## 2025-08-18 ENCOUNTER — OFFICE VISIT (OUTPATIENT)
Dept: HEPATOLOGY | Facility: CLINIC | Age: 40
End: 2025-08-18
Payer: OTHER GOVERNMENT

## 2025-08-18 DIAGNOSIS — R97.8 ELEVATED TUMOR MARKERS: Primary | ICD-10-CM

## 2025-08-18 DIAGNOSIS — R76.8 ANA POSITIVE: ICD-10-CM

## 2025-08-18 LAB — RENIN PLAS-CCNC: 0.9 NG/ML/H

## 2025-08-18 PROCEDURE — 98006 SYNCH AUDIO-VIDEO EST MOD 30: CPT | Mod: 95,,, | Performed by: INTERNAL MEDICINE

## 2025-08-19 ENCOUNTER — TELEPHONE (OUTPATIENT)
Dept: HEPATOLOGY | Facility: CLINIC | Age: 40
End: 2025-08-19
Payer: OTHER GOVERNMENT

## 2025-08-20 LAB
COLLECT DURATION TIME UR: 24 H
CORTIS 24H UR-MRATE: 20 MCG/24 H (ref 3.5–45)
SPECIMEN VOL 24H UR: 1000 ML

## 2025-08-21 LAB
21HYDROXYLASE AB SER QL: NEGATIVE
W METANEPHRINE, FREE: <25 PG/ML
W NORMETANEPHRINE, FREE: 49 PG/ML
W TOTAL, FREE (MN + NMN): 49 PG/ML

## 2025-08-27 ENCOUNTER — PATIENT MESSAGE (OUTPATIENT)
Dept: OBSTETRICS AND GYNECOLOGY | Facility: CLINIC | Age: 40
End: 2025-08-27
Payer: OTHER GOVERNMENT

## 2025-08-27 ENCOUNTER — PATIENT MESSAGE (OUTPATIENT)
Dept: ENDOCRINOLOGY | Facility: CLINIC | Age: 40
End: 2025-08-27
Payer: OTHER GOVERNMENT

## (undated) DEVICE — DRAPE MEDIUM SHEET 40X70IN

## (undated) DEVICE — CHLORAPREP 10.5 ML APPLICATOR

## (undated) DEVICE — GLOVE SENSICARE PI GRN 7.5

## (undated) DEVICE — SYS LABEL CORRECT MED

## (undated) DEVICE — PAD GROUNDING DISPER ELECTRODE

## (undated) DEVICE — NDL SPINAL 25GX3.5 SPINOCAN

## (undated) DEVICE — CANNULA RADIOPAQUE 20G CURVED